# Patient Record
Sex: MALE | Race: WHITE | NOT HISPANIC OR LATINO | Employment: UNEMPLOYED | URBAN - METROPOLITAN AREA
[De-identification: names, ages, dates, MRNs, and addresses within clinical notes are randomized per-mention and may not be internally consistent; named-entity substitution may affect disease eponyms.]

---

## 2023-07-09 ENCOUNTER — APPOINTMENT (OUTPATIENT)
Dept: CARDIOLOGY | Facility: HOSPITAL | Age: 53
DRG: 871 | End: 2023-07-09
Payer: MEDICARE

## 2023-07-09 ENCOUNTER — APPOINTMENT (OUTPATIENT)
Dept: CT IMAGING | Facility: HOSPITAL | Age: 53
DRG: 871 | End: 2023-07-09
Payer: MEDICARE

## 2023-07-09 ENCOUNTER — HOSPITAL ENCOUNTER (INPATIENT)
Facility: HOSPITAL | Age: 53
LOS: 29 days | Discharge: HOME | DRG: 871 | End: 2023-08-07
Attending: EMERGENCY MEDICINE | Admitting: INTERNAL MEDICINE
Payer: MEDICARE

## 2023-07-09 ENCOUNTER — APPOINTMENT (OUTPATIENT)
Dept: GENERAL RADIOLOGY | Facility: HOSPITAL | Age: 53
DRG: 871 | End: 2023-07-09
Payer: MEDICARE

## 2023-07-09 DIAGNOSIS — R00.1 BRADYCARDIA: ICD-10-CM

## 2023-07-09 DIAGNOSIS — Z78.9 DECREASED ACTIVITIES OF DAILY LIVING (ADL): ICD-10-CM

## 2023-07-09 DIAGNOSIS — I95.9 HYPOTENSION, UNSPECIFIED HYPOTENSION TYPE: ICD-10-CM

## 2023-07-09 DIAGNOSIS — T68.XXXA HYPOTHERMIA DUE TO COLD ENVIRONMENT: Primary | ICD-10-CM

## 2023-07-09 DIAGNOSIS — R26.2 DIFFICULTY WALKING: ICD-10-CM

## 2023-07-09 DIAGNOSIS — E87.0 HYPERNATREMIA: ICD-10-CM

## 2023-07-09 DIAGNOSIS — G93.40 ENCEPHALOPATHY ACUTE: ICD-10-CM

## 2023-07-09 LAB
ALBUMIN SERPL-MCNC: 2.9 G/DL (ref 3.5–5.2)
ALBUMIN/GLOB SERPL: 1.3 G/DL
ALP SERPL-CCNC: 90 U/L (ref 39–117)
ALT SERPL W P-5'-P-CCNC: 37 U/L (ref 1–41)
AMPHET+METHAMPHET UR QL: NEGATIVE
ANION GAP SERPL CALCULATED.3IONS-SCNC: 9 MMOL/L (ref 5–15)
APAP SERPL-MCNC: <5 MCG/ML (ref 0–30)
APTT PPP: 34.1 SECONDS (ref 24.2–34.2)
ARTERIAL PATENCY WRIST A: POSITIVE
AST SERPL-CCNC: 40 U/L (ref 1–40)
BARBITURATES UR QL SCN: NEGATIVE
BASE EXCESS BLDA CALC-SCNC: -5.8 MMOL/L (ref -2–2)
BASOPHILS # BLD AUTO: 0.01 10*3/MM3 (ref 0–0.2)
BASOPHILS NFR BLD AUTO: 0.2 % (ref 0–1.5)
BDY SITE: ABNORMAL
BENZODIAZ UR QL SCN: NEGATIVE
BH CV ECHO MEAS - AO ROOT DIAM: 4 CM
BH CV ECHO MEAS - EDV(CUBED): 115.5 ML
BH CV ECHO MEAS - EDV(MOD-SP2): 45 ML
BH CV ECHO MEAS - EDV(MOD-SP4): 61.2 ML
BH CV ECHO MEAS - EF(MOD-BP): 58.7 %
BH CV ECHO MEAS - EF(MOD-SP2): 53.6 %
BH CV ECHO MEAS - EF(MOD-SP4): 68.6 %
BH CV ECHO MEAS - ESV(CUBED): 34.3 ML
BH CV ECHO MEAS - ESV(MOD-SP2): 20.9 ML
BH CV ECHO MEAS - ESV(MOD-SP4): 19.2 ML
BH CV ECHO MEAS - FS: 33.3 %
BH CV ECHO MEAS - IVS/LVPW: 1.11 CM
BH CV ECHO MEAS - IVSD: 1.28 CM
BH CV ECHO MEAS - LA DIMENSION: 3 CM
BH CV ECHO MEAS - LAT PEAK E' VEL: 9.7 CM/SEC
BH CV ECHO MEAS - LV DIASTOLIC VOL/BSA (35-75): 32 CM2
BH CV ECHO MEAS - LV MASS(C)D: 228.2 GRAMS
BH CV ECHO MEAS - LV SYSTOLIC VOL/BSA (12-30): 10 CM2
BH CV ECHO MEAS - LVIDD: 4.9 CM
BH CV ECHO MEAS - LVIDS: 3.3 CM
BH CV ECHO MEAS - LVOT AREA: 3.1 CM2
BH CV ECHO MEAS - LVOT DIAM: 2 CM
BH CV ECHO MEAS - LVPWD: 1.15 CM
BH CV ECHO MEAS - MED PEAK E' VEL: 10.3 CM/SEC
BH CV ECHO MEAS - MV A MAX VEL: 64.7 CM/SEC
BH CV ECHO MEAS - MV DEC SLOPE: 480 CM/SEC2
BH CV ECHO MEAS - MV DEC TIME: 0.17 MSEC
BH CV ECHO MEAS - MV E MAX VEL: 83.1 CM/SEC
BH CV ECHO MEAS - MV E/A: 1.28
BH CV ECHO MEAS - RVDD: 2.9 CM
BH CV ECHO MEAS - SI(MOD-SP2): 12.6 ML/M2
BH CV ECHO MEAS - SI(MOD-SP4): 22 ML/M2
BH CV ECHO MEAS - SV(MOD-SP2): 24.1 ML
BH CV ECHO MEAS - SV(MOD-SP4): 42 ML
BH CV ECHO MEAS - TAPSE (>1.6): 2.09 CM
BH CV ECHO MEASUREMENTS AVERAGE E/E' RATIO: 8.31
BILIRUB SERPL-MCNC: <0.2 MG/DL (ref 0–1.2)
BILIRUB UR QL STRIP: NEGATIVE
BUN SERPL-MCNC: 28 MG/DL (ref 6–20)
BUN/CREAT SERPL: 35.9 (ref 7–25)
CALCIUM SPEC-SCNC: 9.1 MG/DL (ref 8.6–10.5)
CANNABINOIDS SERPL QL: NEGATIVE
CHLORIDE SERPL-SCNC: 119 MMOL/L (ref 98–107)
CK SERPL-CCNC: 141 U/L (ref 20–200)
CLARITY UR: CLEAR
CO2 SERPL-SCNC: 25 MMOL/L (ref 22–29)
COCAINE UR QL: NEGATIVE
COHGB MFR BLD: 0.3 % (ref 0–1.5)
COLOR UR: YELLOW
CREAT SERPL-MCNC: 0.78 MG/DL (ref 0.76–1.27)
CRP SERPL-MCNC: 1.2 MG/DL (ref 0–0.5)
D-LACTATE SERPL-SCNC: 1.3 MMOL/L (ref 0.5–2)
D-LACTATE SERPL-SCNC: 2.8 MMOL/L (ref 0.5–2)
DEPRECATED RDW RBC AUTO: 49.7 FL (ref 37–54)
EGFRCR SERPLBLD CKD-EPI 2021: 107.3 ML/MIN/1.73
EOSINOPHIL # BLD AUTO: 0 10*3/MM3 (ref 0–0.4)
EOSINOPHIL NFR BLD AUTO: 0 % (ref 0.3–6.2)
ERYTHROCYTE [DISTWIDTH] IN BLOOD BY AUTOMATED COUNT: 15.1 % (ref 12.3–15.4)
FENTANYL UR-MCNC: NEGATIVE NG/ML
FHHB: 8.9 % (ref 0–5)
GAS FLOW AIRWAY: 0 LPM
GEN 5 2HR TROPONIN T REFLEX: 46 NG/L
GLOBULIN UR ELPH-MCNC: 2.3 GM/DL
GLUCOSE BLDC GLUCOMTR-MCNC: 128 MG/DL (ref 70–99)
GLUCOSE SERPL-MCNC: 185 MG/DL (ref 65–99)
GLUCOSE UR STRIP-MCNC: NEGATIVE MG/DL
HBA1C MFR BLD: 7.7 % (ref 4.8–5.6)
HCO3 BLDA-SCNC: 20.9 MMOL/L (ref 22–26)
HCT VFR BLD AUTO: 35.8 % (ref 37.5–51)
HGB BLD-MCNC: 12 G/DL (ref 13–17.7)
HGB BLDA-MCNC: 11.2 G/DL (ref 13.8–16.4)
HGB UR QL STRIP.AUTO: NEGATIVE
HIV1+2 AB SER QL: NORMAL
HOLD SPECIMEN: NORMAL
HOLD SPECIMEN: NORMAL
IMM GRANULOCYTES # BLD AUTO: 0.02 10*3/MM3 (ref 0–0.05)
IMM GRANULOCYTES NFR BLD AUTO: 0.3 % (ref 0–0.5)
INHALED O2 CONCENTRATION: 21 %
INR PPP: 1.14 (ref 0.86–1.15)
KETONES UR QL STRIP: NEGATIVE
L PNEUMO1 AG UR QL IA: NEGATIVE
LEFT ATRIUM VOLUME INDEX: 10.7 ML/M2
LEUKOCYTE ESTERASE UR QL STRIP.AUTO: NEGATIVE
LYMPHOCYTES # BLD AUTO: 0.75 10*3/MM3 (ref 0.7–3.1)
LYMPHOCYTES NFR BLD AUTO: 12.1 % (ref 19.6–45.3)
MAGNESIUM SERPL-MCNC: 1.7 MG/DL (ref 1.6–2.6)
MCH RBC QN AUTO: 30.2 PG (ref 26.6–33)
MCHC RBC AUTO-ENTMCNC: 33.5 G/DL (ref 31.5–35.7)
MCV RBC AUTO: 90.2 FL (ref 79–97)
METHADONE UR QL SCN: NEGATIVE
METHGB BLD QL: 0.1 % (ref 0–1.5)
MODALITY: ABNORMAL
MONOCYTES # BLD AUTO: 0.48 10*3/MM3 (ref 0.1–0.9)
MONOCYTES NFR BLD AUTO: 7.8 % (ref 5–12)
MRSA DNA SPEC QL NAA+PROBE: ABNORMAL
NEUTROPHILS NFR BLD AUTO: 4.92 10*3/MM3 (ref 1.7–7)
NEUTROPHILS NFR BLD AUTO: 79.6 % (ref 42.7–76)
NITRITE UR QL STRIP: NEGATIVE
NRBC BLD AUTO-RTO: 0 /100 WBC (ref 0–0.2)
NT-PROBNP SERPL-MCNC: 497.7 PG/ML (ref 0–900)
OPIATES UR QL: NEGATIVE
OXYCODONE UR QL SCN: NEGATIVE
OXYHGB MFR BLDV: 90.7 % (ref 94–99)
PCO2 BLDA: 46.2 MM HG (ref 35–45)
PH BLDA: 7.27 PH UNITS (ref 7.35–7.45)
PH UR STRIP.AUTO: <=5 [PH] (ref 5–8)
PHOSPHATE SERPL-MCNC: 2.6 MG/DL (ref 2.5–4.5)
PLATELET # BLD AUTO: 215 10*3/MM3 (ref 140–450)
PMV BLD AUTO: 12.2 FL (ref 6–12)
PO2 BLD: 356 MM[HG] (ref 0–500)
PO2 BLDA: 74.8 MM HG (ref 80–100)
POTASSIUM SERPL-SCNC: 3.2 MMOL/L (ref 3.5–5.2)
PROCALCITONIN SERPL-MCNC: 0.04 NG/ML (ref 0–0.25)
PROT SERPL-MCNC: 5.2 G/DL (ref 6–8.5)
PROT UR QL STRIP: NEGATIVE
PROTHROMBIN TIME: 14.7 SECONDS (ref 11.8–14.9)
RBC # BLD AUTO: 3.97 10*6/MM3 (ref 4.14–5.8)
S PNEUM AG SPEC QL LA: NEGATIVE
SALICYLATES SERPL-MCNC: <0.3 MG/DL
SAO2 % BLDCOA: 91.1 % (ref 95–99)
SODIUM SERPL-SCNC: 153 MMOL/L (ref 136–145)
SP GR UR STRIP: 1.01 (ref 1–1.03)
TROPONIN T DELTA: 12 NG/L
TROPONIN T SERPL HS-MCNC: 34 NG/L
TSH SERPL DL<=0.05 MIU/L-ACNC: 3.55 UIU/ML (ref 0.27–4.2)
TSH SERPL DL<=0.05 MIU/L-ACNC: 3.74 UIU/ML (ref 0.27–4.2)
UROBILINOGEN UR QL STRIP: NORMAL
VIT B12 BLD-MCNC: 210 PG/ML (ref 211–946)
WBC NRBC COR # BLD: 6.18 10*3/MM3 (ref 3.4–10.8)
WHOLE BLOOD HOLD COAG: NORMAL
WHOLE BLOOD HOLD SPECIMEN: NORMAL

## 2023-07-09 PROCEDURE — 87899 AGENT NOS ASSAY W/OPTIC: CPT | Performed by: NURSE PRACTITIONER

## 2023-07-09 PROCEDURE — 80307 DRUG TEST PRSMV CHEM ANLYZR: CPT | Performed by: INTERNAL MEDICINE

## 2023-07-09 PROCEDURE — 99285 EMERGENCY DEPT VISIT HI MDM: CPT

## 2023-07-09 PROCEDURE — 83036 HEMOGLOBIN GLYCOSYLATED A1C: CPT | Performed by: NURSE PRACTITIONER

## 2023-07-09 PROCEDURE — 84484 ASSAY OF TROPONIN QUANT: CPT | Performed by: INTERNAL MEDICINE

## 2023-07-09 PROCEDURE — 80179 DRUG ASSAY SALICYLATE: CPT | Performed by: INTERNAL MEDICINE

## 2023-07-09 PROCEDURE — 84100 ASSAY OF PHOSPHORUS: CPT | Performed by: EMERGENCY MEDICINE

## 2023-07-09 PROCEDURE — 81003 URINALYSIS AUTO W/O SCOPE: CPT | Performed by: EMERGENCY MEDICINE

## 2023-07-09 PROCEDURE — 85730 THROMBOPLASTIN TIME PARTIAL: CPT | Performed by: EMERGENCY MEDICINE

## 2023-07-09 PROCEDURE — 84443 ASSAY THYROID STIM HORMONE: CPT | Performed by: INTERNAL MEDICINE

## 2023-07-09 PROCEDURE — 25010000002 VANCOMYCIN 5 G RECONSTITUTED SOLUTION: Performed by: INTERNAL MEDICINE

## 2023-07-09 PROCEDURE — 82948 REAGENT STRIP/BLOOD GLUCOSE: CPT

## 2023-07-09 PROCEDURE — G0432 EIA HIV-1/HIV-2 SCREEN: HCPCS | Performed by: NURSE PRACTITIONER

## 2023-07-09 PROCEDURE — 87449 NOS EACH ORGANISM AG IA: CPT | Performed by: NURSE PRACTITIONER

## 2023-07-09 PROCEDURE — 51702 INSERT TEMP BLADDER CATH: CPT

## 2023-07-09 PROCEDURE — 80053 COMPREHEN METABOLIC PANEL: CPT | Performed by: EMERGENCY MEDICINE

## 2023-07-09 PROCEDURE — 83735 ASSAY OF MAGNESIUM: CPT | Performed by: EMERGENCY MEDICINE

## 2023-07-09 PROCEDURE — 84145 PROCALCITONIN (PCT): CPT | Performed by: NURSE PRACTITIONER

## 2023-07-09 PROCEDURE — 80074 ACUTE HEPATITIS PANEL: CPT | Performed by: NURSE PRACTITIONER

## 2023-07-09 PROCEDURE — 0 CEFEPIME PER 500 MG: Performed by: EMERGENCY MEDICINE

## 2023-07-09 PROCEDURE — 25010000002 MAGNESIUM SULFATE 2 GM/50ML SOLUTION: Performed by: INTERNAL MEDICINE

## 2023-07-09 PROCEDURE — 86140 C-REACTIVE PROTEIN: CPT | Performed by: EMERGENCY MEDICINE

## 2023-07-09 PROCEDURE — 36415 COLL VENOUS BLD VENIPUNCTURE: CPT | Performed by: INTERNAL MEDICINE

## 2023-07-09 PROCEDURE — 80184 ASSAY OF PHENOBARBITAL: CPT | Performed by: INTERNAL MEDICINE

## 2023-07-09 PROCEDURE — G0480 DRUG TEST DEF 1-7 CLASSES: HCPCS | Performed by: INTERNAL MEDICINE

## 2023-07-09 PROCEDURE — 99291 CRITICAL CARE FIRST HOUR: CPT | Performed by: INTERNAL MEDICINE

## 2023-07-09 PROCEDURE — 83880 ASSAY OF NATRIURETIC PEPTIDE: CPT | Performed by: INTERNAL MEDICINE

## 2023-07-09 PROCEDURE — 25010000002 HEPARIN (PORCINE) PER 1000 UNITS: Performed by: INTERNAL MEDICINE

## 2023-07-09 PROCEDURE — 84443 ASSAY THYROID STIM HORMONE: CPT | Performed by: EMERGENCY MEDICINE

## 2023-07-09 PROCEDURE — 87040 BLOOD CULTURE FOR BACTERIA: CPT | Performed by: EMERGENCY MEDICINE

## 2023-07-09 PROCEDURE — 82550 ASSAY OF CK (CPK): CPT | Performed by: EMERGENCY MEDICINE

## 2023-07-09 PROCEDURE — 0 POTASSIUM CHLORIDE 10 MEQ/100ML SOLUTION: Performed by: INTERNAL MEDICINE

## 2023-07-09 PROCEDURE — 83050 HGB METHEMOGLOBIN QUAN: CPT | Performed by: EMERGENCY MEDICINE

## 2023-07-09 PROCEDURE — 85610 PROTHROMBIN TIME: CPT | Performed by: EMERGENCY MEDICINE

## 2023-07-09 PROCEDURE — 93005 ELECTROCARDIOGRAM TRACING: CPT | Performed by: EMERGENCY MEDICINE

## 2023-07-09 PROCEDURE — 82607 VITAMIN B-12: CPT | Performed by: NURSE PRACTITIONER

## 2023-07-09 PROCEDURE — 71045 X-RAY EXAM CHEST 1 VIEW: CPT

## 2023-07-09 PROCEDURE — 85025 COMPLETE CBC W/AUTO DIFF WBC: CPT | Performed by: EMERGENCY MEDICINE

## 2023-07-09 PROCEDURE — 82375 ASSAY CARBOXYHB QUANT: CPT | Performed by: EMERGENCY MEDICINE

## 2023-07-09 PROCEDURE — 87641 MR-STAPH DNA AMP PROBE: CPT | Performed by: NURSE PRACTITIONER

## 2023-07-09 PROCEDURE — 80179 DRUG ASSAY SALICYLATE: CPT | Performed by: NURSE PRACTITIONER

## 2023-07-09 PROCEDURE — 25010000002 VANCOMYCIN 5 G RECONSTITUTED SOLUTION: Performed by: EMERGENCY MEDICINE

## 2023-07-09 PROCEDURE — 36600 WITHDRAWAL OF ARTERIAL BLOOD: CPT | Performed by: EMERGENCY MEDICINE

## 2023-07-09 PROCEDURE — 80143 DRUG ASSAY ACETAMINOPHEN: CPT | Performed by: NURSE PRACTITIONER

## 2023-07-09 PROCEDURE — 93010 ELECTROCARDIOGRAM REPORT: CPT | Performed by: SPECIALIST

## 2023-07-09 PROCEDURE — 83605 ASSAY OF LACTIC ACID: CPT | Performed by: EMERGENCY MEDICINE

## 2023-07-09 PROCEDURE — 71250 CT THORAX DX C-: CPT

## 2023-07-09 PROCEDURE — 70450 CT HEAD/BRAIN W/O DYE: CPT

## 2023-07-09 PROCEDURE — 25010000002 DOPAMINE PER 40 MG

## 2023-07-09 PROCEDURE — 82805 BLOOD GASES W/O2 SATURATION: CPT | Performed by: EMERGENCY MEDICINE

## 2023-07-09 PROCEDURE — 0 CEFEPIME PER 500 MG: Performed by: INTERNAL MEDICINE

## 2023-07-09 PROCEDURE — 93306 TTE W/DOPPLER COMPLETE: CPT

## 2023-07-09 RX ORDER — MAGNESIUM SULFATE HEPTAHYDRATE 40 MG/ML
2 INJECTION, SOLUTION INTRAVENOUS ONCE
Status: COMPLETED | OUTPATIENT
Start: 2023-07-09 | End: 2023-07-09

## 2023-07-09 RX ORDER — POLYETHYLENE GLYCOL 3350 17 G/17G
17 POWDER, FOR SOLUTION ORAL DAILY PRN
Status: DISCONTINUED | OUTPATIENT
Start: 2023-07-09 | End: 2023-07-23

## 2023-07-09 RX ORDER — BISACODYL 10 MG
10 SUPPOSITORY, RECTAL RECTAL DAILY PRN
Status: DISCONTINUED | OUTPATIENT
Start: 2023-07-09 | End: 2023-07-23

## 2023-07-09 RX ORDER — VANCOMYCIN 2 GRAM/500 ML IN 0.9 % SODIUM CHLORIDE INTRAVENOUS
20 ONCE
Status: COMPLETED | OUTPATIENT
Start: 2023-07-09 | End: 2023-07-09

## 2023-07-09 RX ORDER — SODIUM CHLORIDE 0.9 % (FLUSH) 0.9 %
10 SYRINGE (ML) INJECTION AS NEEDED
Status: DISCONTINUED | OUTPATIENT
Start: 2023-07-09 | End: 2023-08-07 | Stop reason: HOSPADM

## 2023-07-09 RX ORDER — DOPAMINE HYDROCHLORIDE 160 MG/100ML
INJECTION, SOLUTION INTRAVENOUS
Status: COMPLETED
Start: 2023-07-09 | End: 2023-07-09

## 2023-07-09 RX ORDER — ACETAMINOPHEN 325 MG/1
650 TABLET ORAL EVERY 4 HOURS PRN
Status: DISCONTINUED | OUTPATIENT
Start: 2023-07-09 | End: 2023-08-07 | Stop reason: HOSPADM

## 2023-07-09 RX ORDER — SODIUM CHLORIDE 0.9 % (FLUSH) 0.9 %
10 SYRINGE (ML) INJECTION EVERY 12 HOURS SCHEDULED
Status: DISCONTINUED | OUTPATIENT
Start: 2023-07-09 | End: 2023-08-07 | Stop reason: HOSPADM

## 2023-07-09 RX ORDER — SODIUM CHLORIDE 9 MG/ML
40 INJECTION, SOLUTION INTRAVENOUS AS NEEDED
Status: DISCONTINUED | OUTPATIENT
Start: 2023-07-09 | End: 2023-08-07 | Stop reason: HOSPADM

## 2023-07-09 RX ORDER — OLANZAPINE 15 MG/1
15 TABLET ORAL DAILY
COMMUNITY
End: 2023-08-07 | Stop reason: HOSPADM

## 2023-07-09 RX ORDER — FUROSEMIDE 20 MG/1
20 TABLET ORAL DAILY
COMMUNITY
End: 2023-08-07 | Stop reason: HOSPADM

## 2023-07-09 RX ORDER — AMOXICILLIN 250 MG
2 CAPSULE ORAL 2 TIMES DAILY
Status: DISCONTINUED | OUTPATIENT
Start: 2023-07-10 | End: 2023-07-23

## 2023-07-09 RX ORDER — BISACODYL 5 MG/1
5 TABLET, DELAYED RELEASE ORAL DAILY PRN
Status: DISCONTINUED | OUTPATIENT
Start: 2023-07-09 | End: 2023-07-23

## 2023-07-09 RX ORDER — NOREPINEPHRINE BITARTRATE 0.03 MG/ML
.02-.3 INJECTION, SOLUTION INTRAVENOUS
Status: DISCONTINUED | OUTPATIENT
Start: 2023-07-09 | End: 2023-07-10

## 2023-07-09 RX ORDER — RISPERIDONE 1 MG/1
1 TABLET ORAL 2 TIMES DAILY
Status: ON HOLD | COMMUNITY
End: 2023-08-07 | Stop reason: SDUPTHER

## 2023-07-09 RX ORDER — HEPARIN SODIUM 5000 [USP'U]/ML
5000 INJECTION, SOLUTION INTRAVENOUS; SUBCUTANEOUS EVERY 8 HOURS SCHEDULED
Status: DISCONTINUED | OUTPATIENT
Start: 2023-07-09 | End: 2023-07-10

## 2023-07-09 RX ORDER — DOPAMINE HYDROCHLORIDE 160 MG/100ML
2-20 INJECTION, SOLUTION INTRAVENOUS
Status: DISCONTINUED | OUTPATIENT
Start: 2023-07-09 | End: 2023-07-10

## 2023-07-09 RX ORDER — SODIUM CHLORIDE, SODIUM LACTATE, POTASSIUM CHLORIDE, CALCIUM CHLORIDE 600; 310; 30; 20 MG/100ML; MG/100ML; MG/100ML; MG/100ML
100 INJECTION, SOLUTION INTRAVENOUS CONTINUOUS
Status: DISCONTINUED | OUTPATIENT
Start: 2023-07-09 | End: 2023-07-10

## 2023-07-09 RX ORDER — POTASSIUM CHLORIDE 7.45 MG/ML
10 INJECTION INTRAVENOUS
Status: COMPLETED | OUTPATIENT
Start: 2023-07-09 | End: 2023-07-09

## 2023-07-09 RX ORDER — ONDANSETRON 2 MG/ML
4 INJECTION INTRAMUSCULAR; INTRAVENOUS EVERY 6 HOURS PRN
Status: DISCONTINUED | OUTPATIENT
Start: 2023-07-09 | End: 2023-08-07 | Stop reason: HOSPADM

## 2023-07-09 RX ORDER — SODIUM CHLORIDE 9 MG/ML
250 INJECTION, SOLUTION INTRAVENOUS CONTINUOUS
Status: DISCONTINUED | OUTPATIENT
Start: 2023-07-09 | End: 2023-07-09

## 2023-07-09 RX ADMIN — SODIUM CHLORIDE 2000 ML: 9 INJECTION, SOLUTION INTRAVENOUS at 05:51

## 2023-07-09 RX ADMIN — POTASSIUM CHLORIDE 10 MEQ: 7.46 INJECTION, SOLUTION INTRAVENOUS at 09:12

## 2023-07-09 RX ADMIN — CEFEPIME 2 G: 2 INJECTION, POWDER, FOR SOLUTION INTRAVENOUS at 15:40

## 2023-07-09 RX ADMIN — POTASSIUM CHLORIDE 10 MEQ: 7.46 INJECTION, SOLUTION INTRAVENOUS at 12:51

## 2023-07-09 RX ADMIN — VANCOMYCIN HYDROCHLORIDE 2000 MG: 5 INJECTION, POWDER, LYOPHILIZED, FOR SOLUTION INTRAVENOUS at 06:47

## 2023-07-09 RX ADMIN — Medication 10 ML: at 20:12

## 2023-07-09 RX ADMIN — POTASSIUM CHLORIDE 10 MEQ: 7.46 INJECTION, SOLUTION INTRAVENOUS at 13:42

## 2023-07-09 RX ADMIN — Medication 10 ML: at 12:10

## 2023-07-09 RX ADMIN — DOPAMINE HYDROCHLORIDE 2 MCG/KG/MIN: 160 INJECTION, SOLUTION INTRAVENOUS at 07:31

## 2023-07-09 RX ADMIN — Medication 0.02 MCG/KG/MIN: at 12:03

## 2023-07-09 RX ADMIN — CEFEPIME 2 G: 2 INJECTION, POWDER, FOR SOLUTION INTRAVENOUS at 22:33

## 2023-07-09 RX ADMIN — MAGNESIUM SULFATE IN WATER 2 G: 40 INJECTION, SOLUTION INTRAVENOUS at 09:54

## 2023-07-09 RX ADMIN — HEPARIN SODIUM 5000 UNITS: 5000 INJECTION INTRAVENOUS; SUBCUTANEOUS at 22:33

## 2023-07-09 RX ADMIN — POTASSIUM CHLORIDE 10 MEQ: 7.46 INJECTION, SOLUTION INTRAVENOUS at 11:39

## 2023-07-09 RX ADMIN — CEFEPIME 2 G: 2 INJECTION, POWDER, FOR SOLUTION INTRAVENOUS at 06:46

## 2023-07-09 RX ADMIN — VANCOMYCIN HYDROCHLORIDE 1500 MG: 5 INJECTION, POWDER, LYOPHILIZED, FOR SOLUTION INTRAVENOUS at 20:12

## 2023-07-09 RX ADMIN — HEPARIN SODIUM 5000 UNITS: 5000 INJECTION INTRAVENOUS; SUBCUTANEOUS at 11:40

## 2023-07-09 RX ADMIN — SODIUM CHLORIDE 250 ML/HR: 9 INJECTION, SOLUTION INTRAVENOUS at 06:35

## 2023-07-09 NOTE — LETTER
[facility]  [address]  [place] [address]  [phone]        July 17, 2023      Patient: Alec Medrano  YOB: 1970  Date of Visit: 7/9/2023    Adult Psych referral-    Thank you,  Lakeshia Lopez, MSW  [phone]

## 2023-07-09 NOTE — LETTER
[facility]  [address]  [place] [address]  [phone]        July 24, 2023      Patient: Alec Medrano  YOB: 1970  Date of Visit: 7/9/2023    Referral for placement-    Thank you,  Lakeshia Lopez, MSW  [phone]

## 2023-07-09 NOTE — LETTER
[facility]  [address] [facility] [place] [address]  [phone]        July 17, 2023      Patient: Alec Medrano  YOB: 1970  Date of Visit: 7/9/2023    Referral for adult inpatient psych-    Thank you,  Lakeshia Lopez, MSW  [phone]

## 2023-07-09 NOTE — LETTER
[facility]  [address] [place] [address]  [phone]        August 3, 2023      Patient: Alec Medrano  YOB: 1970  Date of Visit: 7/9/2023    Referral-    Pt's sister, Simran, is his point of contact. Pt is very pleasant but needs supervision due to elopement risks.    Thank you,  Lakeshia Lopez, MSW  [phone]

## 2023-07-09 NOTE — PLAN OF CARE
Goal Outcome Evaluation:                      PATIENT IS OFF OF DAVY HUGGER AND ARCTIC SUN. PATIENT HAS REMAINED NORMOTHERMIC 97.1F SINCE REMOVAL. HE HAS BEEN RELEASED FROM CUSTODY OF CORRECTIONAL FACILITY. LEVO AT 0.11

## 2023-07-09 NOTE — H&P
[facility]   HOSPITALIST HISTORY AND PHYSICAL    Date of admission: 7/9/2023  Patient Name: Alec Medrano   1970  Primary Care Physician:  Provider, No Known    Subjective     Chief Complaint   Patient presents with    Cold Exposure        HPI:  53 y.o. who presents After being found down in his senior care cell shivering/unresponsive his temp axillary at that time was 82 Fahrenheit and is bradycardic to approximately 29 systolic blood pressure approximately 90.  Sent for EMS at that time.  Patient apparently been in solitary confinement for suicidal ideations were psychosis related issues and had not had any of his medications.  APS reports were filed in the ER.    Patient initiated on Arctic sun, warming blanket, Pal hugger and warmed fluids and being admitted to the ICU for further monitoring.    Patient altered able to partially mumble his name but otherwise no hx able to be obtained      PFSH notable for: unable to determine at this time given ams reportedly a psych hx        Objective     Vitals:   Heart Rate:  [29-36] 31  Resp:  [14-20] 14  BP: ()/() 100/70  Flow (L/min):  [2] 2    Physical Exam   Altered ill appearing   Under warming blanket  Pupils equal and round, sluggish but reactive, mildly dilated, occasionally tracking, dry mucosa, 2L NC in mouth   Bradycardia 35-40 during exam, regular, b/l le edema  Roncorous breath sounds, diminished slightly in b/l bases, tachypneic   Abd obese, not rigid, no apparent tenderness, +BS   Only able to barely try and answer name, faintly squeezed my hand to command otherwise no able to participate and can't answer  No tremors, partial withdrawal to painful stimuli    Result Review    Vital signs, labs and any relevant imaging reviewed.     Assessment / Plan     Severe hypothermia  Shock, suspect from hypothermia, possibly also sepsis secondary to undetermined source  Hypotension  Acute hypoxemia requiring at least 2 L nasal cannula initially  Symptomatic  Bradycardia   Prolonged QTc 644 6 and EF acute hypothermia  Hyponatremia  Hypokalemia  Morbid Obesity  Reported psychiatric hx  Suspect underlying CHF      continue rewarming with fluids, blanket,  trend electrolytes, monitor and replace closely  hypernatremia initially suspect from dehydration/hemoconcentration  receiving initial rewarming with iv NS initially, repeat bmp and adjust accordingly   Iv dopamine given shock in ed, likely change to levophed if needed/once in icu  Pulmcrit/dr edwards consulted for additional assistance, appreciate help  check echo, ct chest, ct head  tox screen  panculture, empiric vanc/cefepime for now, wbc wnl, likely narrow based on trend   consider lp/eeg if mentation doesnt continue to show improvement with further resuscitation   Obtaining additional records from shelter, review outpt meds    Admit to icu  Discussed initial management and workup with ED provider    DVT prophylaxis:  Medical DVT prophylaxis orders are present.  Code: full    CBC          7/9/2023    06:11   CBC   WBC 6.18    RBC 3.97    Hemoglobin 12.0    Hematocrit 35.8    MCV 90.2    MCH 30.2    MCHC 33.5    RDW 15.1    Platelets 215        CMP          7/9/2023    06:11   CMP   Glucose 185    BUN 28    Creatinine 0.78    EGFR 107.3    Sodium 153    Potassium 3.2    Chloride 119    Calcium 9.1    Total Protein 5.2    Albumin 2.9    Globulin 2.3    Total Bilirubin <0.2    Alkaline Phosphatase 90    AST (SGOT) 40    ALT (SGPT) 37    Albumin/Globulin Ratio 1.3    BUN/Creatinine Ratio 35.9    Anion Gap 9.0    Patient is critically ill due to shock, severe hypothermia, severe bradycardia and multiple issues as above.  I have spent >35 minutes of critical care time reviewing documentation, pertinent labs, imaging studies, examining the patient, modifying care plan, and discussing patient's condition and care plan with the rn/ed md/pulm

## 2023-07-09 NOTE — LETTER
[facility]  [address] [place] [facility] [address]  [phone]        July 17, 2023      Patient: Alec Medrano  YOB: 1970  Date of Visit: 7/9/2023    Referral for adult inpatient psych-    Thank you,  Lakeshia Lopez, MSW  [phone]
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
## 2023-07-09 NOTE — PROGRESS NOTES
"[facility] Clinical Pharmacy Services: Vancomycin Pharmacokinetic Initial Consult Note    Alec Medrano is a 53 y.o. male who is on day 1 of pharmacy to dose vancomycin for Sepsis.    Consult Information  Consulting Provider: BEAU Aguilar  Planned Duration of Therapy: 7 days  Was Patient Receiving Prior to Admission/Consult?: No  Loading Dose Ordered or Given: 2000 mg on  at 0647  PK/PD Target: -600 mg/L.hr   Relevant ID History: N/A  Other Antimicrobials: Cefepime    Imaging Reviewed?: Yes    Microbiology Data  MRSA PCR performed: No  Culture/Source:   : Blood - Pending    Vitals/Labs  Ht: 154.9 cm (61\"); Wt: 93.8 kg (206 lb 12.7 oz)  Temp (24hrs), Av.7 øF (29.8 øC), Min:85 øF (29.4 øC), Max:86.3 øF (30.2 øC)   Estimated Creatinine Clearance: 108 mL/min (by C-G formula based on SCr of 0.78 mg/dL).       Results from last 7 days   Lab Units 23  0611   CREATININE mg/dL 0.78   WBC 10*3/mm3 6.18     Assessment/Plan:    Vancomycin Dose:  1500 mg IV every 12 hours; which provides the following predicted parameters:  AUC24,ss: 564 mg/L.hr  PAUC*: 82 %  Ctrough,ss: 17.1 mg/L  Pconc*: 38 %  Tox.: 13 %  Vanc Random planned for 7/10 at 1900  Patient has order for Complete Metabolic Panel    Pharmacy will follow patient's kidney function and will adjust doses and obtain levels as necessary. Thank you for involving pharmacy in this patient's care. Please contact pharmacy with any questions or concerns.                           Marisabel Montesinos  Clinical Pharmacist    "

## 2023-07-09 NOTE — CONSULTS
Pulmonary / Critical Care Consult Note      Patient Name: Alec Medrano  : 1970  MRN: [id number]  Primary Care Physician:  Provider, No Known  Referring Physician: Maurizio Aguilar MD  Date of admission: 2023    Subjective   Subjective     Reason for Consult/ Chief Complaint: Found down, hypothermia    HPI:  Alec Medrano is a 53 y.o. male with unknown past medical history, reported to the ED after being found down unresponsive.  Patient is currently incarcerated.  Reportedly patient was hyperactive and was recently started on Zyprexa and Risperdal, along with increasing dose of diuretic.  Today he was found unresponsive in solitary confinement.  In the emergency room patient was noted to have temperature of 85 degrees, he was hypotensive and bradycardic.  Laboratory findings showed WBC 6.1, hemoglobin 12, platelets 215, , lactic acid 2.8, creatinine 0.78, sodium 153, potassium 3.2, CO2 25.  CT of chest did show consolidation bilateral lower lobes, possible displaced rib fractures on right side. He was given IV fluid resuscitation, IV antibiotics and initiated on Levophed.  Warming blanket was applied.  He was admitted to the ICU for critical care management.  Currently patient is on dopamine at 5 mcg/kg/min.  He is awake, does follow some simple commands.  Confused.      Review of Systems  Unable to obtain due to patient status    Personal History     Past Medical History:   Diagnosis Date    Hypotension     Pneumothorax     Pulmonary emboli     Unresponsive episode        History reviewed. No pertinent surgical history.    Family History: family history is not on file. Otherwise pertinent FHx was reviewed and not pertinent to current issue.    Social History:  reports that he has quit smoking. His smoking use included cigarettes. He has a 45.00 pack-year smoking history. He does not have any smokeless tobacco history on file. He reports that he does not currently use alcohol.    Home  Medications:  OLANZapine, furosemide, and risperiDONE    Allergies:  No Known Allergies    Objective    Objective     Vitals:   Temp:  [85 øF (29.4 øC)-94.9 øF (34.9 øC)] 94.9 øF (34.9 øC)  Heart Rate:  [29-71] 71  Resp:  [12-20] 12  BP: ()/() 155/89  Flow (L/min):  [2] 2    Physical Exam:  Vital Signs Reviewed   General: Awake, acutely ill, no acute distress   HEENT:  PERRL, EOMI.  dry oral mucosa  Neck:  Supple, no JVD, no thyromegaly  Chest:  good aeration, clear to auscultation bilaterally, tympanic to percussion bilaterally, no work of breathing noted  CV: Sinus bradycardia, no MGR, pulses 2+, equal.  Abd:  Soft, NT, ND, + BS, no HSM  EXT:  no clubbing, no cyanosis, no edema  Neuro:  A&Ox1, CN grossly intact, no focal deficits.  Skin: No rashes or lesions noted  Bilateral lower extremity edema, increased erythema lower extremities, abrasion over left knee    Result Review    Result Review:  I have personally reviewed the results from the time of this admission to 7/9/2023 14:04 EDT and agree with these findings:  [x]  Laboratory  [x]  Microbiology  [x]  Radiology  []  EKG/Telemetry   [x]  Cardiology/Vascular   []  Pathology  []  Old records  []  Other:  Most notable findings include:       Lab 07/09/23  0611   WBC 6.18   HEMOGLOBIN 12.0*   HEMATOCRIT 35.8*   PLATELETS 215   SODIUM 153*   POTASSIUM 3.2*   CHLORIDE 119*   CO2 25.0   BUN 28*   CREATININE 0.78   GLUCOSE 185*   CALCIUM 9.1   PHOSPHORUS 2.6   TOTAL PROTEIN 5.2*   ALBUMIN 2.9*   GLOBULIN 2.3       CT Head Without Contrast    Result Date: 7/9/2023    1. No acute intracranial abnormality. 2. Paranasal sinus disease worse involving the left maxillary sinus. 3. Suggested old fracture deformity of the nasal bone area.     GRECIA COWAN MD       Electronically Signed and Approved By: GRECIA COWAN MD on 7/09/2023 at 10:22             CT Chest Without Contrast Diagnostic    Result Date: 7/9/2023    1. Consolidation in the lower lobes which could be  reflective of a multi focal pneumonia.  Some atelectasis may also account for some of the appearance.  There is some atelectasis/scarring in the lingula. 2. Possible acute nondisplaced rib fractures on the right.     GRECIA COWAN MD       Electronically Signed and Approved By: GRECIA COWAN MD on 7/09/2023 at 10:30             XR Chest 1 View    Result Date: 7/9/2023    1. Cardiomegaly 2. Slight prominence of central pulmonary vascular markings that may reflect some vascular congestion.       GRECIA COWAN MD       Electronically Signed and Approved By: GRECIA COWAN MD on 7/09/2023 at 6:45                 Assessment & Plan   Assessment / Plan     Active Hospital Problems:  Active Hospital Problems    Diagnosis     **Hypothermia      Impression:  Septic shock, present on admission  Altered mental status  Acute hypoxic hypercarbic respiratory failure  Hypothermia  Pneumonia  Aspiration pneumonia  Lactic acidosis  Hypokalemia  Hypomagnesia  Hypernatremia  Bilateral lower extremity edema    Plan:  -Continue to monitor in ICU  -Patient is currently on 2 L nasal cannula  -Continue bronchopulmonary hygiene  -Aspiration precautions  -Check procalcitonin, blood culture in process, check urine antigens, MRSA PCR  -Check HIV, hepatitis  -Check b12, folate  -Check urine drug screen, Tylenol and salicylate level  -Echocardiogram pending  -Continue dopamine to maintain MAP greater than 65, heart rate greater than 60  -Okay to transition to Levophed if needed  -Continue broad-spectrum antibiotics vancomycin and cefepime  -Trend renal function, monitor replace electrolytes  -Replace potassium and magnesium  -Check A1c    DVT prophylaxis:  Medical DVT prophylaxis orders are present.     Code Status and Medical Interventions:   Ordered at: 07/09/23 0748     Code Status (Patient has no pulse and is not breathing):    CPR (Attempt to Resuscitate)     Medical Interventions (Patient has pulse or is breathing):    Full Support      I,  TOREY Mendez, spent 16 minutes critical care time in accordance to split shared billing.    Electronically signed by TOREY Jones, 07/09/23, 2:04 PM EDT.      The patient is critically ill in the ICU with shock, possibly hypovolemic or septic monitor mental status. Multidisciplinary bedside critical care rounds were performed with nursing staff, respiratory therapy, pharmacy, nutritional services, social work. I have personally reviewed the chart, labs and any pertinent imaging available.  We have spent 36 minutes of critical care time, excluding procedures, in the care of this patient. I, Dr Arroyo, spent 20 mins of critical care time according to split shared billing guidelines.    Electronically signed by Lv Arroyo MD, 07/09/23, 4:08 PM EDT..

## 2023-07-09 NOTE — LETTER
[facility]  [address] [place] [address]  [phone]        July 17, 2023      Patient: Alec Medrano  YOB: 1970  Date of Visit: 7/9/2023    Referral for adult inpatient psych-    Thank you,  Lakeshia Lopez, MSW  [phone]

## 2023-07-10 LAB
ALBUMIN SERPL-MCNC: 2.6 G/DL (ref 3.5–5.2)
ALBUMIN/GLOB SERPL: 1 G/DL
ALP SERPL-CCNC: 80 U/L (ref 39–117)
ALT SERPL W P-5'-P-CCNC: 50 U/L (ref 1–41)
ANION GAP SERPL CALCULATED.3IONS-SCNC: 12.3 MMOL/L (ref 5–15)
ANION GAP SERPL CALCULATED.3IONS-SCNC: 7.1 MMOL/L (ref 5–15)
ANION GAP SERPL CALCULATED.3IONS-SCNC: 9.7 MMOL/L (ref 5–15)
AST SERPL-CCNC: 56 U/L (ref 1–40)
BASOPHILS # BLD AUTO: 0.03 10*3/MM3 (ref 0–0.2)
BASOPHILS NFR BLD AUTO: 0.2 % (ref 0–1.5)
BILIRUB SERPL-MCNC: 0.2 MG/DL (ref 0–1.2)
BUN SERPL-MCNC: 22 MG/DL (ref 6–20)
BUN SERPL-MCNC: 22 MG/DL (ref 6–20)
BUN SERPL-MCNC: 23 MG/DL (ref 6–20)
BUN/CREAT SERPL: 17.1 (ref 7–25)
BUN/CREAT SERPL: 18.8 (ref 7–25)
BUN/CREAT SERPL: 20.5 (ref 7–25)
BURR CELLS BLD QL SMEAR: NORMAL
BURR CELLS BLD QL SMEAR: NORMAL
CALCIUM SPEC-SCNC: 8.8 MG/DL (ref 8.6–10.5)
CALCIUM SPEC-SCNC: 8.8 MG/DL (ref 8.6–10.5)
CALCIUM SPEC-SCNC: 9 MG/DL (ref 8.6–10.5)
CHLORIDE SERPL-SCNC: 122 MMOL/L (ref 98–107)
CHLORIDE SERPL-SCNC: 123 MMOL/L (ref 98–107)
CHLORIDE SERPL-SCNC: 124 MMOL/L (ref 98–107)
CK SERPL-CCNC: 65 U/L (ref 20–200)
CLUMPED PLATELETS: PRESENT
CO2 SERPL-SCNC: 15.7 MMOL/L (ref 22–29)
CO2 SERPL-SCNC: 21.3 MMOL/L (ref 22–29)
CO2 SERPL-SCNC: 22.9 MMOL/L (ref 22–29)
CREAT SERPL-MCNC: 1.12 MG/DL (ref 0.76–1.27)
CREAT SERPL-MCNC: 1.17 MG/DL (ref 0.76–1.27)
CREAT SERPL-MCNC: 1.29 MG/DL (ref 0.76–1.27)
DEPRECATED RDW RBC AUTO: 51.8 FL (ref 37–54)
EGFRCR SERPLBLD CKD-EPI 2021: 66.7 ML/MIN/1.73
EGFRCR SERPLBLD CKD-EPI 2021: 75 ML/MIN/1.73
EGFRCR SERPLBLD CKD-EPI 2021: 79 ML/MIN/1.73
EOSINOPHIL # BLD AUTO: 0.03 10*3/MM3 (ref 0–0.4)
EOSINOPHIL NFR BLD AUTO: 0.2 % (ref 0.3–6.2)
ERYTHROCYTE [DISTWIDTH] IN BLOOD BY AUTOMATED COUNT: 15.7 % (ref 12.3–15.4)
FOLATE SERPL-MCNC: 5.6 NG/ML (ref 4.78–24.2)
GIANT PLATELETS: NORMAL
GLOBULIN UR ELPH-MCNC: 2.5 GM/DL
GLUCOSE BLDC GLUCOMTR-MCNC: 119 MG/DL (ref 70–99)
GLUCOSE BLDC GLUCOMTR-MCNC: 35 MG/DL (ref 70–99)
GLUCOSE BLDC GLUCOMTR-MCNC: 39 MG/DL (ref 70–99)
GLUCOSE BLDC GLUCOMTR-MCNC: 40 MG/DL (ref 70–99)
GLUCOSE BLDC GLUCOMTR-MCNC: 76 MG/DL (ref 70–99)
GLUCOSE BLDC GLUCOMTR-MCNC: 80 MG/DL (ref 70–99)
GLUCOSE BLDC GLUCOMTR-MCNC: 85 MG/DL (ref 70–99)
GLUCOSE SERPL-MCNC: 105 MG/DL (ref 65–99)
GLUCOSE SERPL-MCNC: 65 MG/DL (ref 65–99)
GLUCOSE SERPL-MCNC: 89 MG/DL (ref 65–99)
HAV IGM SERPL QL IA: NORMAL
HBV CORE IGM SERPL QL IA: NORMAL
HBV SURFACE AG SERPL QL IA: NORMAL
HCT VFR BLD AUTO: 36.6 % (ref 37.5–51)
HCV AB SER DONR QL: NORMAL
HGB BLD-MCNC: 12.1 G/DL (ref 13–17.7)
IMM GRANULOCYTES # BLD AUTO: 0.07 10*3/MM3 (ref 0–0.05)
IMM GRANULOCYTES NFR BLD AUTO: 0.5 % (ref 0–0.5)
LARGE PLATELETS: NORMAL
LYMPHOCYTES # BLD AUTO: 0.95 10*3/MM3 (ref 0.7–3.1)
LYMPHOCYTES NFR BLD AUTO: 6.6 % (ref 19.6–45.3)
MAGNESIUM SERPL-MCNC: 1.7 MG/DL (ref 1.6–2.6)
MCH RBC QN AUTO: 30.2 PG (ref 26.6–33)
MCHC RBC AUTO-ENTMCNC: 33.1 G/DL (ref 31.5–35.7)
MCV RBC AUTO: 91.3 FL (ref 79–97)
MONOCYTES # BLD AUTO: 1.27 10*3/MM3 (ref 0.1–0.9)
MONOCYTES NFR BLD AUTO: 8.9 % (ref 5–12)
NEUTROPHILS NFR BLD AUTO: 11.99 10*3/MM3 (ref 1.7–7)
NEUTROPHILS NFR BLD AUTO: 83.6 % (ref 42.7–76)
NRBC BLD AUTO-RTO: 0 /100 WBC (ref 0–0.2)
PHOSPHATE SERPL-MCNC: 3.4 MG/DL (ref 2.5–4.5)
PLATELET # BLD AUTO: 234 10*3/MM3 (ref 140–450)
PMV BLD AUTO: 12 FL (ref 6–12)
POTASSIUM SERPL-SCNC: 3.4 MMOL/L (ref 3.5–5.2)
POTASSIUM SERPL-SCNC: 3.8 MMOL/L (ref 3.5–5.2)
POTASSIUM SERPL-SCNC: 4.2 MMOL/L (ref 3.5–5.2)
PROT SERPL-MCNC: 5.1 G/DL (ref 6–8.5)
QT INTERVAL: 674 MS
RBC # BLD AUTO: 4.01 10*6/MM3 (ref 4.14–5.8)
SODIUM SERPL-SCNC: 150 MMOL/L (ref 136–145)
SODIUM SERPL-SCNC: 153 MMOL/L (ref 136–145)
SODIUM SERPL-SCNC: 155 MMOL/L (ref 136–145)
WBC MORPH BLD: NORMAL
WBC NRBC COR # BLD: 14.34 10*3/MM3 (ref 3.4–10.8)

## 2023-07-10 PROCEDURE — 93005 ELECTROCARDIOGRAM TRACING: CPT | Performed by: INTERNAL MEDICINE

## 2023-07-10 PROCEDURE — 25010000002 AMPICILLIN-SULBACTAM PER 1.5 G: Performed by: INTERNAL MEDICINE

## 2023-07-10 PROCEDURE — 84100 ASSAY OF PHOSPHORUS: CPT | Performed by: INTERNAL MEDICINE

## 2023-07-10 PROCEDURE — 82746 ASSAY OF FOLIC ACID SERUM: CPT | Performed by: NURSE PRACTITIONER

## 2023-07-10 PROCEDURE — 99291 CRITICAL CARE FIRST HOUR: CPT | Performed by: INTERNAL MEDICINE

## 2023-07-10 PROCEDURE — 97165 OT EVAL LOW COMPLEX 30 MIN: CPT

## 2023-07-10 PROCEDURE — 82948 REAGENT STRIP/BLOOD GLUCOSE: CPT

## 2023-07-10 PROCEDURE — 82550 ASSAY OF CK (CPK): CPT | Performed by: NURSE PRACTITIONER

## 2023-07-10 PROCEDURE — 0 CEFEPIME PER 500 MG: Performed by: INTERNAL MEDICINE

## 2023-07-10 PROCEDURE — 94799 UNLISTED PULMONARY SVC/PX: CPT

## 2023-07-10 PROCEDURE — 83735 ASSAY OF MAGNESIUM: CPT | Performed by: INTERNAL MEDICINE

## 2023-07-10 PROCEDURE — 85007 BL SMEAR W/DIFF WBC COUNT: CPT | Performed by: INTERNAL MEDICINE

## 2023-07-10 PROCEDURE — 80053 COMPREHEN METABOLIC PANEL: CPT | Performed by: INTERNAL MEDICINE

## 2023-07-10 PROCEDURE — 93010 ELECTROCARDIOGRAM REPORT: CPT | Performed by: INTERNAL MEDICINE

## 2023-07-10 PROCEDURE — 94761 N-INVAS EAR/PLS OXIMETRY MLT: CPT

## 2023-07-10 PROCEDURE — 25010000002 MAGNESIUM SULFATE 2 GM/50ML SOLUTION: Performed by: INTERNAL MEDICINE

## 2023-07-10 PROCEDURE — 25010000002 HEPARIN (PORCINE) PER 1000 UNITS: Performed by: INTERNAL MEDICINE

## 2023-07-10 PROCEDURE — 85025 COMPLETE CBC W/AUTO DIFF WBC: CPT | Performed by: INTERNAL MEDICINE

## 2023-07-10 PROCEDURE — 25010000002 VANCOMYCIN 5 G RECONSTITUTED SOLUTION: Performed by: INTERNAL MEDICINE

## 2023-07-10 PROCEDURE — 25010000002 THIAMINE PER 100 MG: Performed by: INTERNAL MEDICINE

## 2023-07-10 PROCEDURE — 25010000002 CYANOCOBALAMIN PER 1000 MCG: Performed by: INTERNAL MEDICINE

## 2023-07-10 RX ORDER — POTASSIUM CHLORIDE 750 MG/1
20 CAPSULE, EXTENDED RELEASE ORAL ONCE
Status: COMPLETED | OUTPATIENT
Start: 2023-07-10 | End: 2023-07-10

## 2023-07-10 RX ORDER — DEXTROSE MONOHYDRATE 25 G/50ML
50 INJECTION, SOLUTION INTRAVENOUS
Status: DISCONTINUED | OUTPATIENT
Start: 2023-07-10 | End: 2023-08-07 | Stop reason: HOSPADM

## 2023-07-10 RX ORDER — LIDOCAINE 50 MG/G
2 PATCH TOPICAL
Status: DISCONTINUED | OUTPATIENT
Start: 2023-07-10 | End: 2023-08-07 | Stop reason: HOSPADM

## 2023-07-10 RX ORDER — CHOLECALCIFEROL (VITAMIN D3) 125 MCG
1000 CAPSULE ORAL DAILY
Status: DISCONTINUED | OUTPATIENT
Start: 2023-07-11 | End: 2023-08-07 | Stop reason: HOSPADM

## 2023-07-10 RX ORDER — CYANOCOBALAMIN 1000 UG/ML
1000 INJECTION, SOLUTION INTRAMUSCULAR; SUBCUTANEOUS
Status: DISCONTINUED | OUTPATIENT
Start: 2023-07-10 | End: 2023-08-07 | Stop reason: HOSPADM

## 2023-07-10 RX ORDER — DEXTROSE MONOHYDRATE 50 MG/ML
100 INJECTION, SOLUTION INTRAVENOUS CONTINUOUS
Status: DISCONTINUED | OUTPATIENT
Start: 2023-07-10 | End: 2023-07-10

## 2023-07-10 RX ORDER — NOREPINEPHRINE BITARTRATE 0.03 MG/ML
.02-.3 INJECTION, SOLUTION INTRAVENOUS
Status: DISCONTINUED | OUTPATIENT
Start: 2023-07-10 | End: 2023-07-11

## 2023-07-10 RX ORDER — DEXTROSE MONOHYDRATE 100 MG/ML
100 INJECTION, SOLUTION INTRAVENOUS CONTINUOUS
Status: DISCONTINUED | OUTPATIENT
Start: 2023-07-10 | End: 2023-07-11

## 2023-07-10 RX ORDER — DEXTROSE MONOHYDRATE 25 G/50ML
INJECTION, SOLUTION INTRAVENOUS
Status: DISPENSED
Start: 2023-07-10 | End: 2023-07-10

## 2023-07-10 RX ORDER — ENOXAPARIN SODIUM 100 MG/ML
40 INJECTION SUBCUTANEOUS EVERY 24 HOURS
Status: DISCONTINUED | OUTPATIENT
Start: 2023-07-11 | End: 2023-08-07 | Stop reason: HOSPADM

## 2023-07-10 RX ORDER — MAGNESIUM SULFATE HEPTAHYDRATE 40 MG/ML
2 INJECTION, SOLUTION INTRAVENOUS ONCE
Status: COMPLETED | OUTPATIENT
Start: 2023-07-10 | End: 2023-07-10

## 2023-07-10 RX ORDER — MULTIPLE VITAMINS W/ MINERALS TAB 9MG-400MCG
1 TAB ORAL DAILY
Status: DISCONTINUED | OUTPATIENT
Start: 2023-07-11 | End: 2023-08-07 | Stop reason: HOSPADM

## 2023-07-10 RX ADMIN — HEPARIN SODIUM 5000 UNITS: 5000 INJECTION INTRAVENOUS; SUBCUTANEOUS at 06:00

## 2023-07-10 RX ADMIN — SODIUM CHLORIDE 3 G: 900 INJECTION INTRAVENOUS at 23:16

## 2023-07-10 RX ADMIN — THIAMINE HYDROCHLORIDE 500 MG: 100 INJECTION, SOLUTION INTRAMUSCULAR; INTRAVENOUS at 18:18

## 2023-07-10 RX ADMIN — DEXTROSE MONOHYDRATE 50 ML: 25 INJECTION, SOLUTION INTRAVENOUS at 15:33

## 2023-07-10 RX ADMIN — Medication 0.06 MCG/KG/MIN: at 01:56

## 2023-07-10 RX ADMIN — DEXTROSE MONOHYDRATE 100 ML/HR: 50 INJECTION, SOLUTION INTRAVENOUS at 10:52

## 2023-07-10 RX ADMIN — Medication 10 ML: at 08:05

## 2023-07-10 RX ADMIN — VANCOMYCIN HYDROCHLORIDE 1500 MG: 5 INJECTION, POWDER, LYOPHILIZED, FOR SOLUTION INTRAVENOUS at 08:04

## 2023-07-10 RX ADMIN — SODIUM CHLORIDE, POTASSIUM CHLORIDE, SODIUM LACTATE AND CALCIUM CHLORIDE 100 ML/HR: 600; 310; 30; 20 INJECTION, SOLUTION INTRAVENOUS at 01:10

## 2023-07-10 RX ADMIN — THIAMINE HYDROCHLORIDE 500 MG: 100 INJECTION, SOLUTION INTRAMUSCULAR; INTRAVENOUS at 10:00

## 2023-07-10 RX ADMIN — POTASSIUM CHLORIDE 20 MEQ: 750 CAPSULE, EXTENDED RELEASE ORAL at 15:16

## 2023-07-10 RX ADMIN — DEXTROSE MONOHYDRATE 50 ML: 25 INJECTION, SOLUTION INTRAVENOUS at 10:55

## 2023-07-10 RX ADMIN — SODIUM CHLORIDE 3 G: 900 INJECTION INTRAVENOUS at 10:00

## 2023-07-10 RX ADMIN — MAGNESIUM SULFATE IN WATER 2 G: 40 INJECTION, SOLUTION INTRAVENOUS at 10:51

## 2023-07-10 RX ADMIN — SODIUM CHLORIDE 3 G: 900 INJECTION INTRAVENOUS at 15:37

## 2023-07-10 RX ADMIN — DEXTROSE MONOHYDRATE 50 ML: 25 INJECTION, SOLUTION INTRAVENOUS at 08:29

## 2023-07-10 RX ADMIN — CEFEPIME 2 G: 2 INJECTION, POWDER, FOR SOLUTION INTRAVENOUS at 06:00

## 2023-07-10 RX ADMIN — DEXTROSE MONOHYDRATE 100 ML/HR: 100 INJECTION, SOLUTION INTRAVENOUS at 15:34

## 2023-07-10 RX ADMIN — CYANOCOBALAMIN 1000 MCG: 1000 INJECTION, SOLUTION INTRAMUSCULAR at 10:00

## 2023-07-10 RX ADMIN — HEPARIN SODIUM 5000 UNITS: 5000 INJECTION INTRAVENOUS; SUBCUTANEOUS at 15:16

## 2023-07-10 NOTE — PROGRESS NOTES
Critical care    Patient critically ill in the intensive care unit with septic shock lactic acidosis and hypoxic and hypercapnic respiratory failure  Has improved  Still hypotension still  Mental status improved but still altered      Review of Systems  Unable to obtain due to patient status      Vitals:    07/10/23 1215 07/10/23 1230 07/10/23 1245 07/10/23 1300   BP: (!) 88/64 98/64 (!) 84/67 96/62   Pulse: 58 60 62 65   Resp:       Temp:       TempSrc:       SpO2: 96% 96% 96% 94%   Weight:       Height:             Physical Exam:  Vital Signs Reviewed   General: Awake, acutely ill, no acute distress   HEENT:  PERRL, EOMI.  dry oral mucosa  Neck:  Supple, no JVD, no thyromegaly  Chest:  good aeration, clear to auscultation bilaterally, tympanic to percussion bilaterally, no work of breathing noted  CV: Sinus bradycardia, no MGR, pulses 2+, equal.  Abd:  Soft, NT, ND, + BS, no HSM  EXT:  no clubbing, no cyanosis, no edema  Neuro:  A&Ox1, CN grossly intact, no focal deficits.  Skin: No rashes or lesions noted  Bilateral lower extremity edema, increased erythema lower extremities, abrasion over left knee    Impression:  Septic shock, present on admission  Altered mental status  Acute hypoxic hypercarbic respiratory failure  Hypothermia  Pneumonia likely aspiration pneumonia concern for gram-positive and gram-negative organisms  Aspiration pneumonia  Lactic acidosis  Hypokalemia  Hypomagnesia  Hypernatremia  Bilateral lower extremity edema    Plan  Dopamine as needed for bradycardia   Levophed for hypotension if needed  De-escalate antibiotics to ampicillin  Replace magnesium with 4 g of IV mag  Remove Esposito catheter and follow-up with serial bladder scans  Patient does have significant B12 deficiency we will B12 injections  Start folic acid  Start multivitamin    Critically ill in the ICU with hypercapnic respiratory failure hypoxic respiratory failure and septic shock  Total critical care time spent 36  minutes    Electronically signed by Wilson Fermin DO, 07/10/23, 1:40 PM EDT.

## 2023-07-10 NOTE — PLAN OF CARE
Goal Outcome Evaluation:     Patient is alert and confused. Normothermic throughout the shift. Patient had a brief bradycardic episode. Levophed weaned from 0.1 to 0.02 throughout the shift

## 2023-07-10 NOTE — CONSULTS
"Nutrition Services    Patient Name: Alec Medrano  YOB: 1970  MRN: [id number]  Admission date: 7/9/2023      CLINICAL NUTRITION ASSESSMENT      Reason for Assessment  MST score 2+     H&P:    Past Medical History:   Diagnosis Date    COPD (chronic obstructive pulmonary disease)     Diabetes mellitus     Hyperlipidemia     Hypertension     Hypotension     Pneumothorax     Pulmonary emboli     Unresponsive episode         Current Problems:   Active Hospital Problems    Diagnosis     **Hypothermia         Nutrition/Diet History         Narrative     Patient admitted with hypothermia. Patient with AMS per chart review. Followed by RD for MST score of 2 for unsure weight loss, no report of decreased appetite/intake. Limited weight history available. BMI 39. No physical signs of malnutrition noted at this time. Currently NPO. Will monitor intake as diet advances and follow up to provide nutrition intervention if indicated.       Anthropometrics        Current Height, Weight Height: 154.9 cm (61\")  Weight: 93.8 kg (206 lb 12.7 oz)   Current BMI Body mass index is 39.07 kg/mý.       Weight Hx  Wt Readings from Last 30 Encounters:   07/09/23 0748 93.8 kg (206 lb 12.7 oz)   07/09/23 0559 93.8 kg (206 lb 12.7 oz)   07/09/23 0550 93.8 kg (206 lb 12.7 oz)            Wt Change Observation [place]      Estimated/Assessed Needs       Energy Requirements 30 kcal/kg adj. BW (62.7 kg)   EST Needs (kcal/day) 1880       Protein Requirements 1.0-1.3 g/kg adj. BW   EST Daily Needs (g/day) 65-80       Fluid Requirements 1 ml/kcal     Estimated Needs (mL/day) 1880     Labs/Medications         Pertinent Labs Reviewed.   Results from last 7 days   Lab Units 07/10/23  0237 07/09/23  0611   SODIUM mmol/L 155* 153*   POTASSIUM mmol/L 3.8 3.2*   CHLORIDE mmol/L 124* 119*   CO2 mmol/L 21.3* 25.0   BUN mg/dL 23* 28*   CREATININE mg/dL 1.12 0.78   CALCIUM mg/dL 8.8 9.1   BILIRUBIN mg/dL 0.2 <0.2   ALK PHOS U/L 80 90   ALT (SGPT) U/L 50* 37 "   AST (SGOT) U/L 56* 40   GLUCOSE mg/dL 65 185*     Results from last 7 days   Lab Units 07/10/23  0237 07/09/23  0611   MAGNESIUM mg/dL 1.7 1.7   PHOSPHORUS mg/dL 3.4 2.6   HEMOGLOBIN g/dL 12.1* 12.0*   HEMATOCRIT % 36.6* 35.8*     No results found for: COVID19  Lab Results   Component Value Date    HGBA1C 7.70 (H) 07/09/2023         Pertinent Medications Reviewed.     Current Nutrition Orders & Evaluation of Intake       Oral Nutrition     Current PO Diet NPO Diet NPO Type: Strict NPO   Supplement No active supplement orders       Malnutrition Severity Assessment                Nutrition Diagnosis         Nutrition Dx Problem 1 No nutrition diagnosis at this time.       Nutrition Intervention         No nutrition intervention at this time.     Medical Nutrition Therapy/Nutrition Education          Learner     Readiness N/A  Education not indicated at this time and Education not appropriate at this time     Method     Response N/A  N/A     Monitor/Evaluation        Monitor Per protocol, PO intake, Pertinent labs, Weight, POC/GOC, Diet advancement       Nutrition Discharge Plan         To be determined       Electronically signed by:  Nisa Osborne RD  07/10/23 10:45 EDT

## 2023-07-10 NOTE — PAYOR COMM NOTE
+++NOT AN INMATE++++++    AUTHORIZATION REQUEST for EMERGENCY DEPARTMENT ADMISSION        PATIENT INFORMATION  Name:             Alec Medrano  MRN#:            [id number]        ADMISSION INFORMATION  CLASS:          Inpatient   DOS:               7/9/2023        ADMISSION DIAGNOSIS AND HOSPITAL PROBLEMS    Problems Addressed this Visit    None  Visit Diagnoses       Hypothermia due to cold environment    -  Primary    Hypernatremia        Encephalopathy acute        Bradycardia        Hypotension, unspecified hypotension type        Decreased activities of daily living (ADL)              Diagnoses         Codes Comments    Hypothermia due to cold environment    -  Primary ICD-10-CM: T68.XXXA  ICD-9-CM: 991.6     Hypernatremia     ICD-10-CM: E87.0  ICD-9-CM: 276.0     Encephalopathy acute     ICD-10-CM: G93.40  ICD-9-CM: 348.30     Bradycardia     ICD-10-CM: R00.1  ICD-9-CM: 427.89     Hypotension, unspecified hypotension type     ICD-10-CM: I95.9  ICD-9-CM: 458.9     Decreased activities of daily living (ADL)     ICD-10-CM: Z78.9  ICD-9-CM: V49.89                  ADMITTING PROVIDER INFORMATION  Name/NPI       Maurizio Aguilar MD [[id number]]  Phone:            Phone: [phone]        RENDERING FACILITY  Name:             [facility]   NPI:                 [id number]  TID:                 [id number]  Address:        [address] [place], [place] [address] [address]        CASE MANAGEMENT CONTACT INFORMATION  Name:             ELAINE Hong  Phone:            [phone]  Fax:                [phone]        Alec Medrano (53 y.o. Male)       Date of Birth   1970    Social Security Number       Address   [address] [place], [place] [address] [address]    Home Phone   [phone]    MRN   [id number]       Christianity   None    Marital Status   Unknown                            Admission Date   7/9/23    Admission Type   Emergency    Admitting Provider   Maurizio Aguilar MD    Attending Provider   Maurizio Aguilar,  "MD    Department, Room/Bed   [facility] INTENSIVE CARE UNIT, I06/1       Discharge Date       Discharge Disposition       Discharge Destination                                 Attending Provider: Maurizio Aguilar MD    Allergies: No Known Allergies    Isolation: Contact   Infection: MRSA (07/09/23)   Code Status: CPR    Ht: 154.9 cm (61\")   Wt: 93.8 kg (206 lb 12.7 oz)    Admission Cmt: None   Principal Problem: Hypothermia [T68.XXXA]                   Active Insurance as of 7/9/2023       Primary Coverage       Payor Plan Insurance Group Employer/Plan Group    HUMANA MEDICARE REPLACEMENT HUMANA [facility] HMO MEDICARE REPLACEMENT NON PAR [id number]       Payor Plan Address Payor Plan Phone Number Payor Plan Fax Number Effective Dates       1/1/2023 - None Entered      Subscriber Name Subscriber Birth Date Member ID       DAFNE DIAS 1970 [id number]               Secondary Coverage       Payor Plan Insurance Group Employer/Plan Group    [id number]        Payor Plan Address Payor Plan Phone Number Payor Plan Fax Number Effective Dates    [address]   7/9/2023 - None Entered    [place] [address]         Subscriber Name Subscriber Birth Date Member ID       DAFNE DIAS 1970 [id number]               Tertiary Coverage       Payor Plan Insurance Group Employer/Plan Group    CORRECTIONAL FACILITY [facility]       Coverage Address Coverage Phone Number Coverage Fax Number Effective Dates    [address] [phone]  7/9/2023 - 7/9/2023    [place] [address]         Subscriber Name Subscriber Birth Date Member ID       DAFNE DIAS 1970 [id number]                     Emergency Contacts        (Rel.) Home Phone Work Phone Mobile Phone    SAQIBJOSELITO (Sister) -- -- [phone]    DARRON FAIR (Sister) -- -- [phone]        [facility]  [place] [employer]             History & Physical        Maurizio Agiular MD at " 07/09/23 0715           [facility]    Date of admission: 7/9/2023  Patient Name: Alec Medrano   1970  Primary Care Physician:  Provider, No Known    Subjective     Chief Complaint   Patient presents with    Cold Exposure        HPI:  53 y.o. who presents After being found down in his residential cell shivering/unresponsive his temp axillary at that time was 82 Fahrenheit and is bradycardic to approximately 29 systolic blood pressure approximately 90.  Sent for EMS at that time.  Patient apparently been in solitary confinement for suicidal ideations were psychosis related issues and had not had any of his medications.  APS reports were filed in the ER.    Patient initiated on Arctic sun, warming blanket, Pal hugger and warmed fluids and being admitted to the ICU for further monitoring.    Patient altered able to partially mumble his name but otherwise no hx able to be obtained      PFSH notable for: unable to determine at this time given ams reportedly a psych hx        Objective     Vitals:   Heart Rate:  [29-36] 31  Resp:  [14-20] 14  BP: ()/() 100/70  Flow (L/min):  [2] 2    Physical Exam   Altered ill appearing   Under warming blanket  Pupils equal and round, sluggish but reactive, mildly dilated, occasionally tracking, dry mucosa, 2L NC in mouth   Bradycardia 35-40 during exam, regular, b/l le edema  Roncorous breath sounds, diminished slightly in b/l bases, tachypneic   Abd obese, not rigid, no apparent tenderness, +BS   Only able to barely try and answer name, faintly squeezed my hand to command otherwise no able to participate and can't answer  No tremors, partial withdrawal to painful stimuli    Result Review    Vital signs, labs and any relevant imaging reviewed.     Assessment / Plan     Severe hypothermia  Shock, suspect from hypothermia, possibly also sepsis secondary to undetermined source  Hypotension  Acute hypoxemia requiring at least 2 L nasal cannula  initially  Symptomatic Bradycardia   Prolonged QTc 644 6 and EF acute hypothermia  Hyponatremia  Hypokalemia  Morbid Obesity  Reported psychiatric hx  Suspect underlying CHF      continue rewarming with fluids, blanket,  trend electrolytes, monitor and replace closely  hypernatremia initially suspect from dehydration/hemoconcentration  receiving initial rewarming with iv NS initially, repeat bmp and adjust accordingly   Iv dopamine given shock in ed, likely change to levophed if needed/once in icu  Pulmcrit/dr edwards consulted for additional assistance, appreciate help  check echo, ct chest, ct head  tox screen  panculture, empiric vanc/cefepime for now, wbc wnl, likely narrow based on trend   consider lp/eeg if mentation doesnt continue to show improvement with further resuscitation   Obtaining additional records from nursing home, review outpt meds    Admit to icu  Discussed initial management and workup with ED provider    DVT prophylaxis:  Medical DVT prophylaxis orders are present.  Code: full    CBC          7/9/2023    06:11   CBC   WBC 6.18    RBC 3.97    Hemoglobin 12.0    Hematocrit 35.8    MCV 90.2    MCH 30.2    MCHC 33.5    RDW 15.1    Platelets 215        CMP          7/9/2023    06:11   CMP   Glucose 185    BUN 28    Creatinine 0.78    EGFR 107.3    Sodium 153    Potassium 3.2    Chloride 119    Calcium 9.1    Total Protein 5.2    Albumin 2.9    Globulin 2.3    Total Bilirubin <0.2    Alkaline Phosphatase 90    AST (SGOT) 40    ALT (SGPT) 37    Albumin/Globulin Ratio 1.3    BUN/Creatinine Ratio 35.9    Anion Gap 9.0    Patient is critically ill due to shock, severe hypothermia, severe bradycardia and multiple issues as above.  I have spent >35 minutes of critical care time reviewing documentation, pertinent labs, imaging studies, examining the patient, modifying care plan, and discussing patient's condition and care plan with the rn/ed md/pulm        Electronically signed by Maurizio Aguilar MD at 07/09/23  0749          Emergency Department Notes        Donta Franco MD at 07/09/23 0616          Time: 6:16 AM EDT  Date of encounter:  7/9/2023  Independent Historian/Clinical History and Information was obtained by:   EMS    History is limited by: Altered Mental Status    Chief Complaint: Unresponsive      History of Present Illness:  Patient is a 53 y.o. year old male who presents to the emergency department for evaluation of unresponsive    EMS reports that the patient was picked up at the [facility] after he was found unresponsive.  The patient was apparently in isolation cell sleeping unclothed on a concrete floor.  They found him to be minimally responsive bradycardic hypotensive and hypothermic.  Found his heart rate was approximately 29 on their initial assessment and his blood pressure was 90 systolic.  They found his axillary temperature to be 82 øF      Patient Care Team  Primary Care Provider: Provider, No Known    Past Medical History:     No Known Allergies  Past Medical History:   Diagnosis Date    COPD (chronic obstructive pulmonary disease)     Diabetes mellitus     Hyperlipidemia     Hypertension     Hypotension     Pneumothorax     Pulmonary emboli     Unresponsive episode      Past Surgical History:   Procedure Laterality Date    SPLENECTOMY       Family History   Problem Relation Age of Onset    Depression Mother     Cancer Mother     Alcohol abuse Mother     Hypertension Father     Diabetes Father     Depression Father     COPD Father     Cancer Father     Alcohol abuse Father     Mental illness Sister     Learning disabilities Sister     Kidney disease Sister     Hyperlipidemia Sister     Diabetes Sister     Depression Sister     COPD Sister     Bleeding Disorder Sister     Hypertension Brother     Hyperlipidemia Brother     Early death Brother     Diabetes Brother     Depression Brother     Alcohol abuse Brother     Depression Son     Alcohol abuse Son     Depression Maternal Aunt      "Cancer Maternal Aunt     Depression Maternal Uncle     Cancer Maternal Uncle     Depression Paternal Aunt     Cancer Paternal Aunt     Depression Paternal Uncle     Cancer Paternal Uncle     Depression Maternal Grandmother     Depression Maternal Grandfather     Diabetes Paternal Grandmother     Depression Paternal Grandmother     Depression Paternal Grandfather        Home Medications:  Prior to Admission medications    Not on File        Social History:   Social History     Tobacco Use    Smoking status: Former     Packs/day: 1.50     Years: 30.00     Pack years: 45.00     Types: Cigarettes     Passive exposure: Past    Smokeless tobacco: Never   Vaping Use    Vaping Use: Never used   Substance Use Topics    Alcohol use: Not Currently    Drug use: Never         Review of Systems:  Review of Systems   Unable to perform ROS: Mental status change      Physical Exam:  /67   Pulse 73   Temp 96.9 øF (36.1 øC) (Bladder)   Resp 21   Ht 154.9 cm (61\")   Wt 93.8 kg (206 lb 12.7 oz)   SpO2 92%   BMI 39.07 kg/mý     Physical Exam  Vitals and nursing note reviewed.   Constitutional:       General: He is not in acute distress.     Appearance: He is toxic-appearing.      Comments: Patient appears unkempt    Generalized anasarca   HENT:      Head: Normocephalic and atraumatic.      Jaw: There is normal jaw occlusion.   Eyes:      General: Lids are normal.      Extraocular Movements: Extraocular movements intact.      Pupils: Pupils are equal, round, and reactive to light.      Comments: Periorbital edema bilaterally   Cardiovascular:      Rate and Rhythm: Regular rhythm. Bradycardia present.      Pulses: Normal pulses.      Heart sounds: Normal heart sounds.   Pulmonary:      Effort: Pulmonary effort is normal. No respiratory distress.      Breath sounds: Normal breath sounds. No wheezing or rhonchi.   Abdominal:      General: Abdomen is flat.      Palpations: Abdomen is soft.      Tenderness: There is no abdominal " tenderness. There is no guarding or rebound.   Musculoskeletal:         General: Normal range of motion.      Cervical back: Normal range of motion and neck supple.      Right lower leg: Edema present.      Left lower leg: Edema present.   Skin:     Comments: Bulla bilateral knees    Subacute abrasion bilateral knees and feet    Soiled skin   Neurological:      Mental Status: He is lethargic and disoriented.   Psychiatric:         Mood and Affect: Affect is flat.              Procedures:  Procedures      Medical Decision Making:      Comorbidities that affect care:    Unknown    External Notes reviewed:    None      The following orders were placed and all results were independently analyzed by me:  Orders Placed This Encounter   Procedures    Blood Culture - Blood,    Blood Culture - Blood,    MRSA Screen, PCR (Inpatient) - Swab, Nares    S. Pneumo Ag Urine or CSF - Urine, Urine, Clean Catch    Legionella Antigen, Urine - Urine, Urine, Clean Catch    XR Chest 1 View    CT Head Without Contrast    CT Chest Without Contrast Diagnostic    McCook Draw    Lactic Acid, Plasma    Comprehensive Metabolic Panel    Protime-INR    aPTT    Magnesium    Phosphorus    C-reactive Protein    Blood Gas, Arterial -With Co-Ox Panel: Yes    Urinalysis With Microscopic If Indicated (No Culture) - Urine, Catheter    CBC Auto Differential    CK    TSH Rfx On Abnormal To Free T4    STAT Lactic Acid, Reflex    TSH Rfx On Abnormal To Free T4    Urine Drug Screen - Urine, Clean Catch    Toxicology Screen, Serum    Comprehensive Metabolic Panel    Magnesium    Phosphorus    High Sensitivity Troponin T    BNP    High Sensitivity Troponin T 2Hr    Procalcitonin    HIV-1 & HIV-2 Antibodies    Hepatitis Panel, Acute    Hemoglobin A1c    Vitamin B12    Folate    Salicylate Level    Acetaminophen Level    CK    CBC Auto Differential    Scan Slide    NPO Diet NPO Type: Strict NPO    Undress & Gown    Continuous Pulse Oximetry    Measure Blood  Pressure    Vital Signs    Vital Signs    Notify Provider (With Default Parameters)    Activity - Ad Renea    Up in Chair    Intake & Output    Weigh Patient    Oral Care    Saline Lock & Maintain IV Access    Code Status and Medical Interventions:    Hospitalist (on-call MD unless specified)    Inpatient Pulmonology Consult    Inpatient Case Management  Consult    OT Consult: Eval & Treat    PT Consult: Eval & Treat Functional Mobility Below Baseline    Oxygen Therapy- Nasal Cannula; Titrate 1-6 LPM Per SpO2; 90 - 95%    POC Glucose STAT    POC Glucose Once    ECG 12 Lead Bradycardia    Adult Transthoracic Echo Complete W/ Cont if Necessary Per Protocol    Insert peripheral IV    Insert Large Bore Peripheral IV    Insert 2nd Large Bore Peripheral IV    Insert Peripheral IV    Inpatient Admission    Green Top (Gel)    Lavender Top    Gold Top - SST    Light Blue Top    CBC & Differential    CBC & Differential       Medications Given in the Emergency Department:  Medications   sodium chloride 0.9 % flush 10 mL (has no administration in time range)   sodium chloride 0.9 % flush 10 mL (has no administration in time range)   DOPamine 400 mg in 250 mL D5W infusion (0 mcg/kg/min x 93.8 kg Intravenous Stopped 7/9/23 1700)   sodium chloride 0.9 % flush 10 mL (10 mL Intravenous Given 7/10/23 0805)   sodium chloride 0.9 % flush 10 mL (has no administration in time range)   sodium chloride 0.9 % infusion 40 mL (has no administration in time range)   acetaminophen (TYLENOL) tablet 650 mg (has no administration in time range)   ondansetron (ZOFRAN) injection 4 mg (has no administration in time range)   heparin (porcine) 5000 UNIT/ML injection 5,000 Units (5,000 Units Subcutaneous Given 7/10/23 0600)   sennosides-docusate (PERICOLACE) 8.6-50 MG per tablet 2 tablet (2 tablets Oral Not Given 7/10/23 0805)     And   polyethylene glycol (MIRALAX) packet 17 g (has no administration in time range)     And   bisacodyl  (DULCOLAX) EC tablet 5 mg (has no administration in time range)     And   bisacodyl (DULCOLAX) suppository 10 mg (has no administration in time range)   Pharmacy to dose vancomycin (has no administration in time range)   Pharmacy to Dose Cefepime (has no administration in time range)   cefepime (MAXIPIME) 2 g/100 mL 0.9% NS (mbp) (2 g Intravenous New Bag 7/10/23 0600)   vancomycin 1500 mg/250 mL 0.9% NS IVPB (BHS) (1,500 mg Intravenous New Bag 7/10/23 0804)   norepinephrine (LEVOPHED) 8 mg in 250 mL NS infusion (premix) (0 mcg/kg/min x 93.8 kg Intravenous Stopped 7/10/23 0805)   lactated ringers infusion (100 mL/hr Intravenous New Bag 7/10/23 0110)   dextrose (D50W) (25 g/50 mL) IV injection 50 mL (50 mL Intravenous Given 7/10/23 0829)   dextrose (D50W) 50 % (25 g/50 mL) IV injection  - ADS Override Pull (  Not Given 7/10/23 0830)   cefepime (MAXIPIME) 2 g/100 mL 0.9% NS (mbp) (0 g Intravenous Stopped 7/9/23 0715)   vancomycin IVPB 2000 mg in 0.9% Sodium Chloride (premix) 500 mL (0 mg Intravenous Stopped 7/9/23 1015)   sodium chloride 0.9 % bolus 2,000 mL (0 mL Intravenous Stopped 7/9/23 0637)   magnesium sulfate 2g/50 mL (PREMIX) infusion (2 g Intravenous New Bag 7/9/23 0954)   potassium chloride 10 mEq in 100 mL IVPB (10 mEq Intravenous New Bag 7/9/23 1342)        ED Course:    ED Course as of 07/10/23 0859   Sun Jul 09, 2023   0646 My interpretation of EKG: Junctional rhythm 24, Eubanks J waves present [JS]   0710 Patient's significant hypothermia is difficult to transcutaneous pace.  On further research it appears primary treatment for his bradycardia and hypotension is rewarming. [JS]   0711 I have asked the [facility] nurse examiner's to see the patient due to my concern for the patient's significant neglect and poor living conditions while in custody. [JS]   0711 The patient was discussed with hospitalist for admission.  Patient will require ICU admission due to his critical condition. [JS]      ED Course User  "Index  [JS] Donta Franco MD       Labs:    Lab Results (last 24 hours)       Procedure Component Value Units Date/Time    STAT Lactic Acid, Reflex [[id number]]  (Normal) Collected: 07/09/23 1049    Specimen: Blood Updated: 07/09/23 1113     Lactate 1.3 mmol/L     Toxicology Screen, Serum [[id number]] Collected: 07/09/23 1049    Specimen: Blood Updated: 07/09/23 1053    High Sensitivity Troponin T 2Hr [[id number]]  (Abnormal) Collected: 07/09/23 1049    Specimen: Blood Updated: 07/09/23 1134     HS Troponin T 46 ng/L      Troponin T Delta 12 ng/L     Narrative:      High Sensitive Troponin T Reference Range:  <10.0 ng/L- Negative Female for AMI  <15.0 ng/L- Negative Male for AMI  >=10 - Abnormal Female indicating possible myocardial injury.  >=15 - Abnormal Male indicating possible myocardial injury.   Clinicians would have to utilize clinical acumen, EKG, Troponin, and serial changes to determine if it is an Acute Myocardial Infarction or myocardial injury due to an underlying chronic condition.         Procalcitonin [[id number]]  (Normal) Collected: 07/09/23 1049    Specimen: Blood Updated: 07/09/23 1452     Procalcitonin 0.04 ng/mL     Narrative:      As a Marker for Sepsis (Non-Neonates):    1. <0.5 ng/mL represents a low risk of severe sepsis and/or septic shock.  2. >2 ng/mL represents a high risk of severe sepsis and/or septic shock.    As a Marker for Lower Respiratory Tract Infections that require antibiotic therapy:    PCT on Admission    Antibiotic Therapy       6-12 Hrs later    >0.5                Strongly Recommended  >0.25 - <0.5        Recommended  0.1 - 0.25          Discouraged              Remeasure/reassess PCT  <0.1                Strongly Discouraged     Remeasure/reassess PCT    As 28 day mortality risk marker: \"Change in Procalcitonin Result\" (>80% or <=80%) if Day 0 (or Day 1) and Day 4 values are available. Refer to http://www.[employer]-pct-calculator.com    Change in PCT <=80%  A decrease of PCT " levels below or equal to 80% defines a positive change in PCT test result representing a higher risk for 28-day all-cause mortality of patients diagnosed with severe sepsis for septic shock.    Change in PCT >80%  A decrease of PCT levels of more than 80% defines a negative change in PCT result representing a lower risk for 28-day all-cause mortality of patients diagnosed with severe sepsis or septic shock.    This test is Prognostic not Diagnostic, if elevated correlate with clinical findings before administering antibiotic treatment.        Salicylate Level [[id number]]  (Normal) Collected: 07/09/23 1049    Specimen: Blood Updated: 07/09/23 1503     Salicylate <0.3 mg/dL     Acetaminophen Level [[id number]]  (Normal) Collected: 07/09/23 1049    Specimen: Blood Updated: 07/09/23 1503     Acetaminophen <5.0 mcg/mL     Urine Drug Screen - Urine, Clean Catch [[id number]]  (Normal) Collected: 07/09/23 1531    Specimen: Urine, Clean Catch Updated: 07/09/23 1624     Amphet/Methamphet, Screen Negative     Barbiturates Screen, Urine Negative     Benzodiazepine Screen, Urine Negative     Cocaine Screen, Urine Negative     Opiate Screen Negative     THC, Screen, Urine Negative     Methadone Screen, Urine Negative     Oxycodone Screen, Urine Negative     Fentanyl, Urine Negative    Narrative:      Negative Thresholds Per Drugs Screened:    Amphetamines                 500 ng/ml  Barbiturates                 200 ng/ml  Benzodiazepines              100 ng/ml  Cocaine                      300 ng/ml  Methadone                    300 ng/ml  Opiates                      300 ng/ml  Oxycodone                    100 ng/ml  THC                           50 ng/ml  Fentanyl                       5 ng/ml      The Normal Value for all drugs tested is negative. This report includes final unconfirmed screening results to be used for medical treatment purposes only. Unconfirmed results must not be used for non-medical purposes such as employment  or legal testing. Clinical consideration should be applied to any drug of abuse test, particularly when unconfirmed results are used.            MRSA Screen, PCR (Inpatient) - Swab, Nares [[id number]]  (Abnormal) Collected: 07/09/23 1531    Specimen: Swab from Nares Updated: 07/09/23 1801     MRSA PCR MRSA Detected    Narrative:      The negative predictive value of this diagnostic test is high and should only be used to consider de-escalating anti-MRSA therapy. A positive result may indicate colonization with MRSA and must be correlated clinically.    S. Pneumo Ag Urine or CSF - Urine, Urine, Clean Catch [[id number]]  (Normal) Collected: 07/09/23 1531    Specimen: Urine, Clean Catch Updated: 07/09/23 1643     Strep Pneumo Ag Negative    Legionella Antigen, Urine - Urine, Urine, Clean Catch [[id number]]  (Normal) Collected: 07/09/23 1531    Specimen: Urine, Clean Catch Updated: 07/09/23 1643     LEGIONELLA ANTIGEN, URINE Negative    HIV-1 & HIV-2 Antibodies [[id number]]  (Normal) Collected: 07/09/23 1705    Specimen: Blood Updated: 07/09/23 1744     HIV-1/ HIV-2 Non-Reactive    Hepatitis Panel, Acute [[id number]] Collected: 07/09/23 1705    Specimen: Blood Updated: 07/09/23 1711    Comprehensive Metabolic Panel [[id number]]  (Abnormal) Collected: 07/10/23 0237    Specimen: Blood Updated: 07/10/23 0348     Glucose 65 mg/dL      BUN 23 mg/dL      Creatinine 1.12 mg/dL      Sodium 155 mmol/L      Potassium 3.8 mmol/L      Chloride 124 mmol/L      CO2 21.3 mmol/L      Calcium 8.8 mg/dL      Total Protein 5.1 g/dL      Albumin 2.6 g/dL      ALT (SGPT) 50 U/L      AST (SGOT) 56 U/L      Alkaline Phosphatase 80 U/L      Total Bilirubin 0.2 mg/dL      Globulin 2.5 gm/dL      A/G Ratio 1.0 g/dL      BUN/Creatinine Ratio 20.5     Anion Gap 9.7 mmol/L      eGFR 79.0 mL/min/1.73     Narrative:      GFR Normal >60  Chronic Kidney Disease <60  Kidney Failure <15      CBC & Differential [[id number]]  (Abnormal) Collected: 07/10/23 0233     Specimen: Blood Updated: 07/10/23 0318    Narrative:      The following orders were created for panel order CBC & Differential.  Procedure                               Abnormality         Status                     ---------                               -----------         ------                     CBC Auto Differential[[id number]]        Abnormal            Final result               Scan Slide[[id number]]                                       Final result                 Please view results for these tests on the individual orders.    Magnesium [[id number]]  (Normal) Collected: 07/10/23 0237    Specimen: Blood Updated: 07/10/23 0348     Magnesium 1.7 mg/dL     Phosphorus [[id number]]  (Normal) Collected: 07/10/23 0237    Specimen: Blood Updated:

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
## 2023-07-10 NOTE — PLAN OF CARE
Goal Outcome Evaluation:  Plan of Care Reviewed With: patient        Progress: no change  Outcome Evaluation: Patient has experienced decline in function from baseline status, presenting w/ deficits related to strength, balance, safety awareness, transfers and mobility that impede patient independence with activities of daily living.  Patient would benefit from skilled Occupational Therapy intervention to maxamize patient safety, and promote return to baseline independence.      Anticipated Discharge Disposition (OT): sub acute care setting

## 2023-07-10 NOTE — THERAPY EVALUATION
Patient Name: Alec Medrano  : 1970    MRN: [id number]                              Today's Date: 7/10/2023       Admit Date: 2023    Visit Dx:     ICD-10-CM ICD-9-CM   1. Hypothermia due to cold environment  T68.XXXA 991.6   2. Hypernatremia  E87.0 276.0   3. Encephalopathy acute  G93.40 348.30   4. Bradycardia  R00.1 427.89   5. Hypotension, unspecified hypotension type  I95.9 458.9   6. Decreased activities of daily living (ADL)  Z78.9 V49.89     Patient Active Problem List   Diagnosis    Hypothermia     Past Medical History:   Diagnosis Date    COPD (chronic obstructive pulmonary disease)     Diabetes mellitus     Hyperlipidemia     Hypertension     Hypotension     Pneumothorax     Pulmonary emboli     Unresponsive episode      Past Surgical History:   Procedure Laterality Date    SPLENECTOMY        General Information       Row Name 07/10/23 1255          OT Time and Intention    Document Type evaluation  -ES     Mode of Treatment individual therapy;occupational therapy  -ES       Row Name 07/10/23 1255          General Information    Patient Profile Reviewed yes  -ES     Prior Level of Function independent:;ADL's;all household mobility;community mobility  Patient reports independent with BADLs at baseline, sister assist with IADLs. No device for functional mobility. Standing shower completion. Barton in stance. Endorses home O2 use at night. Denies recent falls.  -ES     Existing Precautions/Restrictions fall;oxygen therapy device and L/min  -ES     Barriers to Rehab none identified  -ES       Row Name 07/10/23 1255          Occupational Profile    Reason for Services/Referral (Occupational Profile) Patient is 53 yr old male admitted [facility] on 2023 after being found unresponsive in long term cell with bradycardia. OT evaluation and treatment ordered d/t recent decline in ADLs/transfer ability and discharge planning recommendations. No previous OT services for current condition.  -ES        Row Name 07/10/23 1255          Living Environment    People in Home sibling(s)  [facility]. Patient reports will discharge home with sister (prior living situation).  -ES       Row Name 07/10/23 1255          Home Main Entrance    Number of Stairs, Main Entrance none  -ES       Row Name 07/10/23 1255          Stairs Within Home, Primary    Number of Stairs, Within Home, Primary none  -ES       Row Name 07/10/23 1255          Cognition    Orientation Status (Cognition) oriented to;person;situation  Patient is pleasant and cooperative. No family present to verify patient provided prior level.  -ES       Row Name 07/10/23 1255          Safety Issues, Functional Mobility    Impairments Affecting Function (Mobility) balance;strength;endurance/activity tolerance;shortness of breath  -ES               User Key  (r) = Recorded By, (t) = Taken By, (c) = Cosigned By      Initials Name Provider Type    Radha Ramos, OTR/L, CSRS Occupational Therapist                     Mobility/ADL's       Row Name 07/10/23 1302          Bed Mobility    Bed Mobility supine-sit;sit-supine  -ES     Supine-Sit Ruby (Bed Mobility) maximum assist (25% patient effort);1 person assist  -ES     Sit-Supine Ruby (Bed Mobility) maximum assist (25% patient effort);1 person assist  -ES     Comment, (Bed Mobility) Patient endorses dizziness seated edge of bed. Patient BP reading 70/60, returned to supine  -ES       Row Name 07/10/23 1302          Transfers    Comment, (Transfers) unable to assess secondary to low BP, symptomatic seated EOB  -ES       Row Name 07/10/23 1302          Functional Mobility    Functional Mobility- Ind. Level not tested  -ES       Row Name 07/10/23 1302          Activities of Daily Living    BADL Assessment/Intervention bathing;upper body dressing;lower body dressing;grooming;feeding;toileting  -ES       Row Name 07/10/23 1302          Bathing Assessment/Intervention    Ruby  Level (Bathing) bathing skills;moderate assist (50% patient effort)  -ES       Row Name 07/10/23 1302          Upper Body Dressing Assessment/Training    Venus Level (Upper Body Dressing) upper body dressing skills;minimum assist (75% patient effort)  -ES       Row Name 07/10/23 1302          Lower Body Dressing Assessment/Training    Venus Level (Lower Body Dressing) lower body dressing skills;maximum assist (25% patient effort)  -ES       Row Name 07/10/23 1302          Grooming Assessment/Training    Venus Level (Grooming) grooming skills;set up  -ES       Row Name 07/10/23 1302          Self-Feeding Assessment/Training    Venus Level (Feeding) feeding skills;set up  -ES       Row Name 07/10/23 1302          Toileting Assessment/Training    Venus Level (Toileting) toileting skills;dependent (less than 25% patient effort)  -ES     Comment, (Toileting) solis catheter in place at time of assessment  -ES               User Key  (r) = Recorded By, (t) = Taken By, (c) = Cosigned By      Initials Name Provider Type    ES Radha Short, OTR/L, CSRS Occupational Therapist                   Obj/Interventions       Row Name 07/10/23 1305          Vision Assessment/Intervention    Visual Impairment/Limitations WFL  -ES       Row Name 07/10/23 1305          Range of Motion Comprehensive    General Range of Motion no range of motion deficits identified  -ES       Row Name 07/10/23 1305          Strength Comprehensive (MMT)    General Manual Muscle Testing (MMT) Assessment upper extremity strength deficits identified;lower extremity strength deficits identified  -ES     Comment, General Manual Muscle Testing (MMT) Assessment BUEs assessed 3+/5  -ES       Row Name 07/10/23 1305          Motor Skills    Motor Skills coordination;functional endurance  -ES     Coordination lower extremity;moderate impairment  -ES     Functional Endurance fair minus  -ES       Row Name 07/10/23 1305           Balance    Balance Assessment sitting dynamic balance  -ES     Dynamic Sitting Balance standby assist  -ES     Position, Sitting Balance unsupported;sitting edge of bed  -ES     Comment, Balance unable to assess standing blanace secondary to symptomatic low BP seated edge of bed  -ES               User Key  (r) = Recorded By, (t) = Taken By, (c) = Cosigned By      Initials Name Provider Type    ES Radha Short, OTR/L, CSRS Occupational Therapist                   Goals/Plan       Row Name 07/10/23 1308          Transfer Goal 1 (OT)    Activity/Assistive Device (Transfer Goal 1, OT) transfers, all;walker, rolling  -ES     Helen Level/Cues Needed (Transfer Goal 1, OT) modified independence  -ES     Time Frame (Transfer Goal 1, OT) long term goal (LTG);10 days  -ES       Row Name 07/10/23 1308          Bathing Goal 1 (OT)    Activity/Device (Bathing Goal 1, OT) bathing skills, all  -ES     Helen Level/Cues Needed (Bathing Goal 1, OT) modified independence  -ES     Time Frame (Bathing Goal 1, OT) long term goal (LTG);10 days  -ES       Row Name 07/10/23 1308          Dressing Goal 1 (OT)    Activity/Device (Dressing Goal 1, OT) dressing skills, all  -ES     Helen/Cues Needed (Dressing Goal 1, OT) modified independence  -ES     Time Frame (Dressing Goal 1, OT) long term goal (LTG);10 days  -ES       Row Name 07/10/23 1308          Toileting Goal 1 (OT)    Activity/Device (Toileting Goal 1, OT) toileting skills, all  -ES     Helen Level/Cues Needed (Toileting Goal 1, OT) modified independence  -ES     Time Frame (Toileting Goal 1, OT) long term goal (LTG);10 days  -ES       Row Name 07/10/23 1308          Grooming Goal 1 (OT)    Activity/Device (Grooming Goal 1, OT) grooming skills, all  -ES     Helen (Grooming Goal 1, OT) modified independence  -ES     Time Frame (Grooming Goal 1, OT) long term goal (LTG);10 days  -ES       Row Name 07/10/23 1308          Strength Goal 1 (OT)     Strength Goal 1 (OT) Pt will increase BUE strength to 4+/5 muscle strength for increased UE strength required for ADL transfers and task completion  -ES     Time Frame (Strength Goal 1, OT) long term goal (LTG);10 days  -ES       Row Name 07/10/23 1308          Problem Specific Goal 1 (OT)    Problem Specific Goal 1 (OT) Patient will demonstrate fair plus activity tolerance in preperation for independent ADL routine completion at time of discharge  -ES     Time Frame (Problem Specific Goal 1, OT) long term goal (LTG);10 days  -ES       Row Name 07/10/23 1308          Therapy Assessment/Plan (OT)    Planned Therapy Interventions (OT) activity tolerance training;BADL retraining;functional balance retraining;occupation/activity based interventions;patient/caregiver education/training;strengthening exercise;transfer/mobility retraining  -ES               User Key  (r) = Recorded By, (t) = Taken By, (c) = Cosigned By      Initials Name Provider Type    ES Radha Short, OTR/L, CSRS Occupational Therapist                   Clinical Impression       Row Name 07/10/23 1306          Plan of Care Review    Plan of Care Reviewed With patient  -ES     Progress no change  -ES     Outcome Evaluation Patient has experienced decline in function from baseline status, presenting w/ deficits related to strength, balance, safety awareness, transfers and mobility that impede patient independence with activities of daily living.  Patient would benefit from skilled Occupational Therapy intervention to maxamize patient safety, and promote return to baseline independence.  -ES       Row Name 07/10/23 1306          Therapy Assessment/Plan (OT)    Rehab Potential (OT) good, to achieve stated therapy goals  -ES     Criteria for Skilled Therapeutic Interventions Met (OT) yes;meets criteria;skilled treatment is necessary  -ES     Therapy Frequency (OT) 5 times/wk  -ES       Row Name 07/10/23 1306          Therapy Plan Review/Discharge Plan (OT)     Anticipated Discharge Disposition (OT) sub acute care setting  -ES       Row Name 07/10/23 1306          Vital Signs    Intra Systolic BP Rehab 70  -ES     Intra Treatment Diastolic BP 60  -ES     Post Systolic BP Rehab 109  -ES     Post Treatment Diastolic BP 75  -ES     Intratreatment Heart Rate (beats/min) 63  -ES     O2 Delivery Pre Treatment nasal cannula  -ES     Intra SpO2 (%) 95  -ES     O2 Delivery Intra Treatment nasal cannula  -ES     O2 Delivery Post Treatment nasal cannula  -ES       Row Name 07/10/23 1306          Positioning and Restraints    Pre-Treatment Position in bed  -ES     Post Treatment Position bed  -ES               User Key  (r) = Recorded By, (t) = Taken By, (c) = Cosigned By      Initials Name Provider Type    ES Radha Short, OTR/L, CSRS Occupational Therapist                   Outcome Measures       Row Name 07/10/23 1311          How much help from another is currently needed...    Putting on and taking off regular lower body clothing? 2  -ES     Bathing (including washing, rinsing, and drying) 2  -ES     Toileting (which includes using toilet bed pan or urinal) 1  -ES     Putting on and taking off regular upper body clothing 3  -ES     Taking care of personal grooming (such as brushing teeth) 3  -ES     Eating meals 4  -ES     AM-PAC 6 Clicks Score (OT) 15  -ES       Row Name 07/10/23 1311          Functional Assessment    Outcome Measure Options AM-PAC 6 Clicks Daily Activity (OT);Optimal Instrument  -ES       Row Name 07/10/23 1311          Optimal Instrument    Optimal Instrument Optimal - 3  -ES     Bending/Stooping 3  -ES     Balancing 3  -ES     Standing 0  unable to assess secondary to low BP  -ES     From the list, choose the 3 activities you would most like to be able to do without any difficulty Bending/stooping;Standing;Balancing  -ES     Total Score Optimal - 3 6  -ES               User Key  (r) = Recorded By, (t) = Taken By, (c) = Cosigned By      Initials Name  Provider Type    Radha Ramos OTR/L, CSRS Occupational Therapist                      OT Recommendation and Plan  Planned Therapy Interventions (OT): activity tolerance training, BADL retraining, functional balance retraining, occupation/activity based interventions, patient/caregiver education/training, strengthening exercise, transfer/mobility retraining  Therapy Frequency (OT): 5 times/wk  Plan of Care Review  Plan of Care Reviewed With: patient  Progress: no change  Outcome Evaluation: Patient has experienced decline in function from baseline status, presenting w/ deficits related to strength, balance, safety awareness, transfers and mobility that impede patient independence with activities of daily living.  Patient would benefit from skilled Occupational Therapy intervention to maxamize patient safety, and promote return to baseline independence.     Time Calculation:    Time Calculation- OT       Row Name 07/10/23 1313             Time Calculation- OT    OT Received On 07/10/23  -ES      OT Goal Re-Cert Due Date 07/19/23  -ES         Untimed Charges    OT Eval/Re-eval Minutes 35  -ES         Total Minutes    Untimed Charges Total Minutes 35  -ES       Total Minutes 35  -ES                User Key  (r) = Recorded By, (t) = Taken By, (c) = Cosigned By      Initials Name Provider Type    Radha Ramos OTR/L, CSRS Occupational Therapist                  Therapy Charges for Today       Code Description Service Date Service Provider Modifiers Qty    42737215720 HC OT EVAL LOW COMPLEXITY 3 7/10/2023 Radha Short OTR/L, FERMIN GO 1                 ANGIE Sin/L, RYLEES  7/10/2023

## 2023-07-11 LAB
ALBUMIN SERPL-MCNC: 2.3 G/DL (ref 3.5–5.2)
ALBUMIN/GLOB SERPL: 0.8 G/DL
ALP SERPL-CCNC: 80 U/L (ref 39–117)
ALT SERPL W P-5'-P-CCNC: 55 U/L (ref 1–41)
AMMONIA BLD-SCNC: 27 UMOL/L (ref 16–60)
ANION GAP SERPL CALCULATED.3IONS-SCNC: 8.6 MMOL/L (ref 5–15)
AST SERPL-CCNC: 49 U/L (ref 1–40)
BASOPHILS # BLD AUTO: 0.02 10*3/MM3 (ref 0–0.2)
BASOPHILS NFR BLD AUTO: 0.2 % (ref 0–1.5)
BILIRUB SERPL-MCNC: 0.2 MG/DL (ref 0–1.2)
BUN SERPL-MCNC: 19 MG/DL (ref 6–20)
BUN/CREAT SERPL: 15.7 (ref 7–25)
CALCIUM SPEC-SCNC: 8.4 MG/DL (ref 8.6–10.5)
CHLORIDE SERPL-SCNC: 118 MMOL/L (ref 98–107)
CO2 SERPL-SCNC: 19.4 MMOL/L (ref 22–29)
CREAT SERPL-MCNC: 1.21 MG/DL (ref 0.76–1.27)
DEPRECATED RDW RBC AUTO: 55.3 FL (ref 37–54)
EGFRCR SERPLBLD CKD-EPI 2021: 72 ML/MIN/1.73
EOSINOPHIL # BLD AUTO: 0.15 10*3/MM3 (ref 0–0.4)
EOSINOPHIL NFR BLD AUTO: 1.2 % (ref 0.3–6.2)
ERYTHROCYTE [DISTWIDTH] IN BLOOD BY AUTOMATED COUNT: 16.5 % (ref 12.3–15.4)
GLOBULIN UR ELPH-MCNC: 2.8 GM/DL
GLUCOSE BLDC GLUCOMTR-MCNC: 189 MG/DL (ref 70–99)
GLUCOSE BLDC GLUCOMTR-MCNC: 213 MG/DL (ref 70–99)
GLUCOSE BLDC GLUCOMTR-MCNC: 246 MG/DL (ref 70–99)
GLUCOSE SERPL-MCNC: 137 MG/DL (ref 65–99)
HCT VFR BLD AUTO: 36.2 % (ref 37.5–51)
HGB BLD-MCNC: 11.8 G/DL (ref 13–17.7)
IMM GRANULOCYTES # BLD AUTO: 0.07 10*3/MM3 (ref 0–0.05)
IMM GRANULOCYTES NFR BLD AUTO: 0.5 % (ref 0–0.5)
LYMPHOCYTES # BLD AUTO: 1.78 10*3/MM3 (ref 0.7–3.1)
LYMPHOCYTES NFR BLD AUTO: 13.7 % (ref 19.6–45.3)
MAGNESIUM SERPL-MCNC: 2 MG/DL (ref 1.6–2.6)
MCH RBC QN AUTO: 29.9 PG (ref 26.6–33)
MCHC RBC AUTO-ENTMCNC: 32.6 G/DL (ref 31.5–35.7)
MCV RBC AUTO: 91.9 FL (ref 79–97)
MONOCYTES # BLD AUTO: 1.36 10*3/MM3 (ref 0.1–0.9)
MONOCYTES NFR BLD AUTO: 10.4 % (ref 5–12)
NEUTROPHILS NFR BLD AUTO: 74 % (ref 42.7–76)
NEUTROPHILS NFR BLD AUTO: 9.64 10*3/MM3 (ref 1.7–7)
NRBC BLD AUTO-RTO: 0.2 /100 WBC (ref 0–0.2)
PHOSPHATE SERPL-MCNC: 2.1 MG/DL (ref 2.5–4.5)
PLATELET # BLD AUTO: 198 10*3/MM3 (ref 140–450)
PMV BLD AUTO: 12.4 FL (ref 6–12)
POTASSIUM SERPL-SCNC: 3.5 MMOL/L (ref 3.5–5.2)
PROT SERPL-MCNC: 5.1 G/DL (ref 6–8.5)
RBC # BLD AUTO: 3.94 10*6/MM3 (ref 4.14–5.8)
SODIUM SERPL-SCNC: 146 MMOL/L (ref 136–145)
WBC NRBC COR # BLD: 13.02 10*3/MM3 (ref 3.4–10.8)

## 2023-07-11 PROCEDURE — 25010000002 AMPICILLIN-SULBACTAM PER 1.5 G: Performed by: INTERNAL MEDICINE

## 2023-07-11 PROCEDURE — 82948 REAGENT STRIP/BLOOD GLUCOSE: CPT

## 2023-07-11 PROCEDURE — 84100 ASSAY OF PHOSPHORUS: CPT | Performed by: INTERNAL MEDICINE

## 2023-07-11 PROCEDURE — 94799 UNLISTED PULMONARY SVC/PX: CPT

## 2023-07-11 PROCEDURE — 25010000002 FUROSEMIDE PER 20 MG: Performed by: NURSE PRACTITIONER

## 2023-07-11 PROCEDURE — 99233 SBSQ HOSP IP/OBS HIGH 50: CPT | Performed by: INTERNAL MEDICINE

## 2023-07-11 PROCEDURE — 25010000002 HYDROCORTISONE SOD SUC (PF) 100 MG RECONSTITUTED SOLUTION: Performed by: INTERNAL MEDICINE

## 2023-07-11 PROCEDURE — 82140 ASSAY OF AMMONIA: CPT | Performed by: INTERNAL MEDICINE

## 2023-07-11 PROCEDURE — 25010000002 ENOXAPARIN PER 10 MG: Performed by: INTERNAL MEDICINE

## 2023-07-11 PROCEDURE — 83825 ASSAY OF MERCURY: CPT | Performed by: INTERNAL MEDICINE

## 2023-07-11 PROCEDURE — 85025 COMPLETE CBC W/AUTO DIFF WBC: CPT | Performed by: INTERNAL MEDICINE

## 2023-07-11 PROCEDURE — 97161 PT EVAL LOW COMPLEX 20 MIN: CPT

## 2023-07-11 PROCEDURE — 36415 COLL VENOUS BLD VENIPUNCTURE: CPT | Performed by: INTERNAL MEDICINE

## 2023-07-11 PROCEDURE — 25010000002 THIAMINE PER 100 MG: Performed by: INTERNAL MEDICINE

## 2023-07-11 PROCEDURE — 83655 ASSAY OF LEAD: CPT | Performed by: INTERNAL MEDICINE

## 2023-07-11 PROCEDURE — 80053 COMPREHEN METABOLIC PANEL: CPT | Performed by: INTERNAL MEDICINE

## 2023-07-11 PROCEDURE — 82175 ASSAY OF ARSENIC: CPT | Performed by: INTERNAL MEDICINE

## 2023-07-11 PROCEDURE — 83735 ASSAY OF MAGNESIUM: CPT | Performed by: INTERNAL MEDICINE

## 2023-07-11 RX ORDER — RISPERIDONE 2 MG/1
2 TABLET ORAL 2 TIMES DAILY
Status: DISCONTINUED | OUTPATIENT
Start: 2023-07-11 | End: 2023-07-12

## 2023-07-11 RX ORDER — FENTANYL/ROPIVACAINE/NS/PF 2-625MCG/1
15 PLASTIC BAG, INJECTION (ML) EPIDURAL
Status: COMPLETED | OUTPATIENT
Start: 2023-07-11 | End: 2023-07-11

## 2023-07-11 RX ORDER — FUROSEMIDE 10 MG/ML
40 INJECTION INTRAMUSCULAR; INTRAVENOUS ONCE
Status: COMPLETED | OUTPATIENT
Start: 2023-07-11 | End: 2023-07-11

## 2023-07-11 RX ADMIN — RISPERIDONE 2 MG: 2 TABLET ORAL at 12:50

## 2023-07-11 RX ADMIN — CYANOCOBALAMIN TAB 500 MCG 1000 MCG: 500 TAB at 10:37

## 2023-07-11 RX ADMIN — SODIUM CHLORIDE 3 G: 900 INJECTION INTRAVENOUS at 21:37

## 2023-07-11 RX ADMIN — RISPERIDONE 2 MG: 2 TABLET ORAL at 21:18

## 2023-07-11 RX ADMIN — FUROSEMIDE 40 MG: 10 INJECTION, SOLUTION INTRAMUSCULAR; INTRAVENOUS at 10:46

## 2023-07-11 RX ADMIN — POTASSIUM PHOSPHATE, MONOBASIC AND POTASSIUM PHOSPHATE, DIBASIC 15 MMOL: 224; 236 INJECTION, SOLUTION, CONCENTRATE INTRAVENOUS at 10:37

## 2023-07-11 RX ADMIN — SODIUM CHLORIDE 3 G: 900 INJECTION INTRAVENOUS at 10:37

## 2023-07-11 RX ADMIN — SODIUM CHLORIDE 3 G: 900 INJECTION INTRAVENOUS at 04:58

## 2023-07-11 RX ADMIN — MULTIPLE VITAMINS W/ MINERALS TAB 1 TABLET: TAB at 10:37

## 2023-07-11 RX ADMIN — SODIUM CHLORIDE 3 G: 900 INJECTION INTRAVENOUS at 16:24

## 2023-07-11 RX ADMIN — HYDROCORTISONE SODIUM SUCCINATE 100 MG: 100 INJECTION, POWDER, FOR SOLUTION INTRAMUSCULAR; INTRAVENOUS at 10:38

## 2023-07-11 RX ADMIN — WHITE PETROLATUM 1 APPLICATION: 1.75 OINTMENT TOPICAL at 21:18

## 2023-07-11 RX ADMIN — Medication 10 ML: at 10:38

## 2023-07-11 RX ADMIN — HYDROCORTISONE SODIUM SUCCINATE 100 MG: 100 INJECTION, POWDER, FOR SOLUTION INTRAMUSCULAR; INTRAVENOUS at 18:16

## 2023-07-11 RX ADMIN — DEXTROSE MONOHYDRATE 100 ML/HR: 100 INJECTION, SOLUTION INTRAVENOUS at 00:48

## 2023-07-11 RX ADMIN — THIAMINE HYDROCHLORIDE 500 MG: 100 INJECTION, SOLUTION INTRAMUSCULAR; INTRAVENOUS at 17:46

## 2023-07-11 RX ADMIN — ENOXAPARIN SODIUM 40 MG: 100 INJECTION SUBCUTANEOUS at 06:24

## 2023-07-11 RX ADMIN — POTASSIUM PHOSPHATE, MONOBASIC AND POTASSIUM PHOSPHATE, DIBASIC 15 MMOL: 224; 236 INJECTION, SOLUTION, CONCENTRATE INTRAVENOUS at 12:50

## 2023-07-11 RX ADMIN — Medication 10 ML: at 21:18

## 2023-07-11 RX ADMIN — THIAMINE HYDROCHLORIDE 500 MG: 100 INJECTION, SOLUTION INTRAMUSCULAR; INTRAVENOUS at 03:04

## 2023-07-11 RX ADMIN — THIAMINE HYDROCHLORIDE 500 MG: 100 INJECTION, SOLUTION INTRAMUSCULAR; INTRAVENOUS at 10:38

## 2023-07-11 NOTE — PLAN OF CARE
Goal Outcome Evaluation:  Plan of Care Reviewed With: patient           Outcome Evaluation: Pt presents with deficits in BLE strength, functional independence, and ambulation distance. He will benefit from skilled PT intervention addressing these deficits.      Anticipated Discharge Disposition (PT): inpatient rehabilitation facility

## 2023-07-11 NOTE — SIGNIFICANT NOTE
Wound Eval / Progress Noted    LORI Curry     Patient Name: Alec Medrano  : 1970  MRN: [id number]  Today's Date: 2023                 Admit Date: 2023    Visit Dx:    ICD-10-CM ICD-9-CM   1. Hypothermia due to cold environment  T68.XXXA 991.6   2. Hypernatremia  E87.0 276.0   3. Encephalopathy acute  G93.40 348.30   4. Bradycardia  R00.1 427.89   5. Hypotension, unspecified hypotension type  I95.9 458.9   6. Decreased activities of daily living (ADL)  Z78.9 V49.89   7. Difficulty walking  R26.2 719.7         Hypothermia        Past Medical History:   Diagnosis Date    COPD (chronic obstructive pulmonary disease)     Diabetes mellitus     Hyperlipidemia     Hypertension     Hypotension     Pneumothorax     Pulmonary emboli     Unresponsive episode         Past Surgical History:   Procedure Laterality Date    SPLENECTOMY           Physical Assessment:  Wound 23 1015 Left anterior knee Abrasion (Active)   Wound Image   23 1455   Dressing Appearance intact;moist drainage 23 1455   Closure None 23 1455   Base black eschar;moist;red;slough 23 145   Periwound intact;redness;pink 23 145   Periwound Temperature warm 23 145   Periwound Skin Turgor soft 23 145   Edges open 23 145   Wound Length (cm) 2 cm 23 145   Wound Width (cm) 1 cm 23 145   Wound Depth (cm) 0.1 cm 23 145   Wound Surface Area (cm^2) 2 cm^2 23 1455   Wound Volume (cm^3) 0.2 cm^3 23 1455   Drainage Characteristics/Odor serous;serosanguineous 23 145   Drainage Amount scant 23 145   Care, Wound cleansed with;irrigated with;sterile normal saline 23 1455   Dressing Care dressing applied;dressing moistened;silver impregnated;hydrofiber;silicone;border dressing 23 145   Periwound Care absorptive dressing applied 23 1455       Wound 23 1015 Right anterior greater trochanter Abrasion (Active)   Wound Image   23 0482    Dressing Appearance intact;moist drainage 07/11/23 1455   Closure None 07/11/23 1455   Base red;slough;moist 07/11/23 1455   Red (%), Wound Tissue Color 50 07/11/23 1455   Yellow (%), Wound Tissue Color 50 07/11/23 1455   Periwound blanchable;redness 07/11/23 1455   Periwound Temperature warm 07/11/23 1455   Periwound Skin Turgor soft 07/11/23 1455   Edges open 07/11/23 1455   Wound Length (cm) 2.7 cm 07/11/23 1455   Wound Width (cm) 3 cm 07/11/23 1455   Wound Depth (cm) 0.1 cm 07/11/23 1455   Wound Surface Area (cm^2) 8.1 cm^2 07/11/23 1455   Wound Volume (cm^3) 0.81 cm^3 07/11/23 1455   Drainage Characteristics/Odor serosanguineous 07/11/23 1455   Drainage Amount small 07/11/23 1455   Care, Wound cleansed with;irrigated with;sterile normal saline 07/11/23 1455   Dressing Care dressing applied;silicone;silver impregnated;hydrofiber;border dressing 07/11/23 1455   Periwound Care absorptive dressing applied 07/11/23 1455       Wound 07/11/23 1455 Left anterior second toe Neuropathic (Active)   Wound Image   07/11/23 1455   Dressing Appearance open to air 07/11/23 1455   Closure None 07/11/23 1455   Base non-blanchable;red;scab 07/11/23 1455   Periwound intact;pink 07/11/23 1455   Periwound Temperature warm 07/11/23 1455   Periwound Skin Turgor soft 07/11/23 1455   Edges open 07/11/23 1455   Wound Length (cm) 0.4 cm 07/11/23 1455   Wound Width (cm) 0.2 cm 07/11/23 1455   Wound Depth (cm) 0.1 cm 07/11/23 1455   Wound Surface Area (cm^2) 0.08 cm^2 07/11/23 1455   Wound Volume (cm^3) 0.008 cm^3 07/11/23 1455   Drainage Amount none 07/11/23 1455   Care, Wound cleansed with;sterile normal saline 07/11/23 3186        Wound Check / Follow-up:  Patient seen today for wound consult. Patient currently in SICU. 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 His mood is labile and shifts depending on the character he is portraying.   He was initially very cooperative and pleasant and then while looking at his  feet, he was becoming agitated. After primary RN came back to room, re-introduced me and reassured him I was there to help, he was agreeable to continued assessment.   Patient with injury to left anterior knee. He has an area with red moist tissue and yellow slough to left anterior knee and also an area with darkened crusting to lateral aspect of anterior knee. Cleansed with NS and gauze. Will recommend daily dressing with silver impregnated hydrofiber and silicone border dressing.  Patient also has an open wound to his right lateral hip. Possible trauma or pressure due to location. Pressure would be Stage II. Wound base is red and moist with minimal slough scattered but with base visible. Cleansed with NS and gauze. Recommending daily dressing with silver impregnated hydrofiber and silicone border dressing.   Edema noted to BLE. He has scattered areas of crusting and excoriations to bilateral feet. Initial concern for bites. No tracks or openings noted between toes with this assessment. He denies itching or scratching feet but he is a poor historian. Will recommend topical treatment at this time.   He does have a crusted ulceration to left 2nd toe and has diabetes.   Buttocks is blanchable and reddened.     Impression: Traumatic injury to left knee; Trauma vs. Pressure to right lateral hip; Excoriations and crusting to bilateral feet. Ulcer to left 2nd toe    Short term goals:  Regain skin integrity. Daily dressing changes. Skin protection, moisture prevention, pressure reduction. Topical skin care / hygiene.     Kandice Kwok RN    7/11/2023    16:16 EDT
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
## 2023-07-11 NOTE — CONSULTS
07/11/23 1015   Coping/Psychosocial   Additional Documentation Spiritual Care (Group)   Spiritual Care   Spiritual Care Source  initiative   Spiritual Care Interventions other (see comments)  (updated on pt's condition and POC by medical team)   Spiritual Care Visit Type initial   Spiritual Care Request other (see comments)  (pt is alert to self only.)
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
## 2023-07-11 NOTE — THERAPY EVALUATION
Acute Care - Physical Therapy Initial Evaluation  LORI Curry     Patient Name: Alec Medrano  Admit date: 2023     Referring Physician: Chris Rivera MD     Surgery Date:* No surgery found *        : 1970  MRN: [id number]  Today's Date: 2023      Visit Dx:     ICD-10-CM ICD-9-CM   1. Hypothermia due to cold environment  T68.XXXA 991.6   2. Hypernatremia  E87.0 276.0   3. Encephalopathy acute  G93.40 348.30   4. Bradycardia  R00.1 427.89   5. Hypotension, unspecified hypotension type  I95.9 458.9   6. Decreased activities of daily living (ADL)  Z78.9 V49.89   7. Difficulty walking  R26.2 719.7     Patient Active Problem List   Diagnosis    Hypothermia     Past Medical History:   Diagnosis Date    COPD (chronic obstructive pulmonary disease)     Diabetes mellitus     Hyperlipidemia     Hypertension     Hypotension     Pneumothorax     Pulmonary emboli     Unresponsive episode      Past Surgical History:   Procedure Laterality Date    SPLENECTOMY       PT Assessment (last 12 hours)       PT Evaluation and Treatment       Row Name 23 1012          Physical Therapy Time and Intention    Subjective Information no complaints  -DP     Document Type evaluation  -DP     Mode of Treatment individual therapy;physical therapy  -DP     Patient Effort good  -DP     Symptoms Noted During/After Treatment none  -DP       Row Name 23 1012          General Information    Patient Observations alert;cooperative;agree to therapy  -DP     Prior Level of Function independent:;all household mobility;community mobility  -DP     Existing Precautions/Restrictions fall;oxygen therapy device and L/min  -DP     Barriers to Rehab none identified  -DP       Row Name 23 1012          Living Environment    Current Living Arrangements home  -DP     Home Accessibility other (see comments)  Unable to obtain due to patient cognition  -DP     People in Home sibling(s)  -DP       Row Name 23 1012          Home Use of  Assistive/Adaptive Equipment    Equipment Currently Used at Home none  -DP       Row Name 07/11/23 1012          Range of Motion (ROM)    Range of Motion ROM is WFL  -DP       Row Name 07/11/23 1012          Strength (Manual Muscle Testing)    Strength (Manual Muscle Testing) bilateral lower extremities;other (see comments)  RLE: 5/5 hip flexion, 5/5 knee extension, 4/5 knee flexion, 4-/5 Ankle DF. 4+/5 hip flexion, 4+/5 knee extension, 4/5 knee flexion, 5/5 Ankle DF.  -DP       Row Name 07/11/23 1012          Bed Mobility    Bed Mobility rolling right;supine-sit;scooting/bridging  -DP     Rolling Right Fountain (Bed Mobility) standby assist  -DP     Scooting/Bridging Fountain (Bed Mobility) minimum assist (75% patient effort)  -DP     Supine-Sit Fountain (Bed Mobility) minimum assist (75% patient effort)  -DP     Bed Mobility, Safety Issues cognitive deficits limit understanding;decreased use of legs for bridging/pushing  -DP     Assistive Device (Bed Mobility) bed rails;draw sheet;head of bed elevated  -DP       Row Name 07/11/23 1012          Transfers    Transfers sit-stand transfer;stand-sit transfer  -DP       Row Name 07/11/23 1012          Sit-Stand Transfer    Sit-Stand Fountain (Transfers) moderate assist (50% patient effort)  -DP     Assistive Device (Sit-Stand Transfers) walker, front-wheeled  -DP       Row Name 07/11/23 1012          Stand-Sit Transfer    Stand-Sit [place] (Transfers) moderate assist (50% patient effort)  -DP     Assistive Device (Stand-Sit Transfers) walker, front-wheeled  -DP     Comment, (Stand-Sit Transfer) Pt displays impulsivity, bending forward to reach for chair insstead of approaching it safely.  -DP       Row Name 07/11/23 1012          Gait/Stairs (Locomotion)    Gait/Stairs Locomotion gait/ambulation independence;gait/ambulation assistive device;distance ambulated  -DP     [place]ountain Level (Gait) moderate assist (50% patient effort)  -DP     Assistive  Device (Gait) walker, front-wheeled  -DP     Distance in Feet (Gait) --  4 steps to chair from bed  -DP       Row Name 07/11/23 1012          Safety Issues, Functional Mobility    Safety Issues Affecting Function (Mobility) ability to follow commands;awareness of need for assistance;impulsivity;insight into deficits/self-awareness;judgment;problem-solving;safety precaution awareness;safety precautions follow-through/compliance;sequencing abilities  -DP     Impairments Affecting Function (Mobility) balance;strength;endurance/activity tolerance;shortness of breath;cognition  -DP     Cognitive Impairments, Mobility Safety/Performance awareness, need for assistance;impulsivity;insight into deficits/self-awareness;judgment;problem-solving/reasoning;safety precaution awareness;safety precaution follow-through  -DP       Row Name 07/11/23 1012          Balance    Balance Assessment standing dynamic balance  -DP     Dynamic Standing Balance moderate assist  -DP     Position/Device Used, Standing Balance supported;walker, front-wheeled  -DP       Row Name             Wound 07/09/23 1015 Left anterior knee Abrasion    Wound - Properties Group Placement Date: 07/09/23  -KP Placement Time: 1015  -KP Present on Hospital Admission: Y  -KP Side: Left  -KP Orientation: anterior  -KP Location: knee  -KP Primary Wound Type: Abrasion  -KP    Retired Wound - Properties Group Placement Date: 07/09/23  -KP Placement Time: 1015  -KP Present on Hospital Admission: Y  -KP Side: Left  -KP Orientation: anterior  -KP Location: knee  -KP Primary Wound Type: Abrasion  -KP    Retired Wound - Properties Group Date first assessed: 07/09/23  -KP Time first assessed: 1015  -KP Present on Hospital Admission: Y  -KP Side: Left  -KP Location: knee  -KP Primary Wound Type: Abrasion  -KP      Row Name             Wound 07/09/23 1015 Right anterior greater trochanter Abrasion    Wound - Properties Group Placement Date: 07/09/23  -KP Placement Time: 1015   -KP Side: Right  -KP Orientation: anterior  -KP Location: greater trochanter  -KP Primary Wound Type: Abrasion  -KP    Retired Wound - Properties Group Placement Date: 07/09/23  -KP Placement Time: 1015  -KP Side: Right  -KP Orientation: anterior  -KP Location: greater trochanter  -KP Primary Wound Type: Abrasion  -KP    Retired Wound - Properties Group Date first assessed: 07/09/23  -KP Time first assessed: 1015  -KP Side: Right  -KP Location: greater trochanter  -KP Primary Wound Type: Abrasion  -KP      Row Name 07/11/23 1012          Plan of Care Review    Plan of Care Reviewed With patient  -DP     Outcome Evaluation Pt presents with deficits in BLE strength, functional independence, and ambulation distance. He will benefit from skilled PT intervention addressing these deficits.  -DP       Row Name 07/11/23 1012          Positioning and Restraints    Pre-Treatment Position sitting in chair/recliner  -DP     Post Treatment Position chair  -DP     In Chair reclined  -DP       Row Name 07/11/23 1012          Therapy Assessment/Plan (PT)    Rehab Potential (PT) good, to achieve stated therapy goals  -DP     Criteria for Skilled Interventions Met (PT) yes  -DP     Therapy Frequency (PT) daily  -DP     Predicted Duration of Therapy Intervention (PT) 10 days  -DP     Problem List (PT) problems related to;balance;mobility;strength  -DP     Activity Limitations Related to Problem List (PT) unable to ambulate safely;unable to transfer safely  -DP       Row Name 07/11/23 1012          Therapy Plan Review/Discharge Plan (PT)    Therapy Plan Review (PT) evaluation/treatment results reviewed;care plan/treatment goals reviewed  -DP       Row Name 07/11/23 1012          Physical Therapy Goals    Bed Mobility Goal Selection (PT) bed mobility, PT goal 1  -DP     Transfer Goal Selection (PT) transfer, PT goal 1  -DP     Gait Training Goal Selection (PT) gait training, PT goal 1  -DP       Row Name 07/11/23 1012          Bed  Mobility Goal 1 (PT)    Activity/Assistive Device (Bed Mobility Goal 1, PT) bed mobility activities, all  -DP     [place] Level/Cues Needed (Bed Mobility Goal 1, PT) independent  -DP       Row Name 07/11/23 1012          Transfer Goal 1 (PT)    Activity/Assistive Device (Transfer Goal 1, PT) transfers, all;walker, rolling  -DP     [place] Level/Cues Needed (Transfer Goal 1, PT) contact guard required  -DP     Time Frame (Transfer Goal 1, PT) long term goal (LTG);10 days  -DP       Row Name 07/11/23 1012          Gait Training Goal 1 (PT)    Activity/Assistive Device (Gait Training Goal 1, PT) gait (walking locomotion);walker, rolling  -DP     [place] Level (Gait Training Goal 1, PT) contact guard required  -DP     Distance (Gait Training Goal 1, PT) 100  -DP     Time Frame (Gait Training Goal 1, PT) long term goal (LTG);10 days  -DP               User Key  (r) = Recorded By, (t) = Taken By, (c) = Cosigned By      Initials Name Provider Type    Angelika Hill, RN Registered Nurse    Rukhsana Walker, PT Physical Therapist                    Physical Therapy Education       Title: PT OT SLP Therapies (Done)       Topic: Physical Therapy (Done)       Point: Mobility training (Done)       Learning Progress Summary             Patient Acceptance, E,TB, VU by ELLEN at 7/11/2023 1033                         Point: Home exercise program (Done)       Learning Progress Summary             Patient Acceptance, E,TB, VU by ELLEN at 7/11/2023 1033                         Point: Body mechanics (Done)       Learning Progress Summary             Patient Acceptance, E,TB, VU by ELLEN at 7/11/2023 1033                         Point: Precautions (Done)       Learning Progress Summary             Patient Acceptance, E,TB, VU by ELLEN at 7/11/2023 1033                                         User Key       Initials Effective Dates Name Provider Type Discipline    ELLEN 06/03/21 -  Rukhsana Vega PT Physical Therapist PT                   PT Recommendation and Plan  Anticipated Discharge Disposition (PT): inpatient rehabilitation facility  Planned Therapy Interventions (PT): balance training, bed mobility training, gait training, home exercise program, motor coordination training, neuromuscular re-education, patient/family education, postural re-education, ROM (range of motion), stair training, strengthening, transfer training, stretching  Therapy Frequency (PT): daily  Plan of Care Reviewed With: patient  Outcome Evaluation: Pt presents with deficits in BLE strength, functional independence, and ambulation distance. He will benefit from skilled PT intervention addressing these deficits.   Outcome Measures       Row Name 07/11/23 1000             How much help from another person do you currently need...    Turning from your back to your side while in flat bed without using bedrails? 4  -DP      Moving from lying on back to sitting on the side of a flat bed without bedrails? 3  -DP      Moving to and from a bed to a chair (including a wheelchair)? 2  -DP      Standing up from a chair using your arms (e.g., wheelchair, bedside chair)? 2  -DP      Climbing 3-5 steps with a railing? 2  -DP      To walk in hospital room? 2  -DP      AM-PAC 6 Clicks Score (PT) 15  -DP         Functional Assessment    Outcome Measure Options AM-PAC 6 Clicks Basic Mobility (PT)  -DP                User Key  (r) = Recorded By, (t) = Taken By, (c) = Cosigned By      Initials Name Provider Type    Rukhsana Walker, PT Physical Therapist                     Time Calculation:    PT Charges       Row Name 07/11/23 1013             Time Calculation    PT Received On 07/11/23  -DP      PT Goal Re-Cert Due Date 07/20/23  -DP         Untimed Charges    PT Eval/Re-eval Minutes 37  -DP         Total Minutes    Untimed Charges Total Minutes 37  -DP       Total Minutes 37  -DP                User Key  (r) = Recorded By, (t) = Taken By, (c) = Cosigned By      Initials Name  Provider Type    DP Rukhsana Vega PT Physical Therapist                      PT G-Codes  Outcome Measure Options: AM-PAC 6 Clicks Basic Mobility (PT)  AM-PAC 6 Clicks Score (PT): 15  AM-PAC 6 Clicks Score (OT): 15    Rukhsana Vega PT  7/11/2023

## 2023-07-12 LAB
ALBUMIN SERPL-MCNC: 2.8 G/DL (ref 3.5–5.2)
ALBUMIN/GLOB SERPL: 0.9 G/DL
ALP SERPL-CCNC: 112 U/L (ref 39–117)
ALT SERPL W P-5'-P-CCNC: 49 U/L (ref 1–41)
ANION GAP SERPL CALCULATED.3IONS-SCNC: 6.9 MMOL/L (ref 5–15)
AST SERPL-CCNC: 31 U/L (ref 1–40)
BASOPHILS # BLD AUTO: 0.01 10*3/MM3 (ref 0–0.2)
BASOPHILS NFR BLD AUTO: 0.1 % (ref 0–1.5)
BILIRUB SERPL-MCNC: 0.2 MG/DL (ref 0–1.2)
BUN SERPL-MCNC: 20 MG/DL (ref 6–20)
BUN/CREAT SERPL: 14.7 (ref 7–25)
CALCIUM SPEC-SCNC: 9.1 MG/DL (ref 8.6–10.5)
CHLORIDE SERPL-SCNC: 110 MMOL/L (ref 98–107)
CO2 SERPL-SCNC: 26.1 MMOL/L (ref 22–29)
CREAT SERPL-MCNC: 1.36 MG/DL (ref 0.76–1.27)
DEPRECATED RDW RBC AUTO: 54.4 FL (ref 37–54)
EGFRCR SERPLBLD CKD-EPI 2021: 62.6 ML/MIN/1.73
EOSINOPHIL # BLD AUTO: 0.14 10*3/MM3 (ref 0–0.4)
EOSINOPHIL NFR BLD AUTO: 1.1 % (ref 0.3–6.2)
ERYTHROCYTE [DISTWIDTH] IN BLOOD BY AUTOMATED COUNT: 16.4 % (ref 12.3–15.4)
GLOBULIN UR ELPH-MCNC: 3.1 GM/DL
GLUCOSE BLDC GLUCOMTR-MCNC: 126 MG/DL (ref 70–99)
GLUCOSE BLDC GLUCOMTR-MCNC: 144 MG/DL (ref 70–99)
GLUCOSE BLDC GLUCOMTR-MCNC: 178 MG/DL (ref 70–99)
GLUCOSE BLDC GLUCOMTR-MCNC: 243 MG/DL (ref 70–99)
GLUCOSE SERPL-MCNC: 156 MG/DL (ref 65–99)
HCT VFR BLD AUTO: 35.8 % (ref 37.5–51)
HGB BLD-MCNC: 12 G/DL (ref 13–17.7)
IMM GRANULOCYTES # BLD AUTO: 0.09 10*3/MM3 (ref 0–0.05)
IMM GRANULOCYTES NFR BLD AUTO: 0.7 % (ref 0–0.5)
LYMPHOCYTES # BLD AUTO: 1.43 10*3/MM3 (ref 0.7–3.1)
LYMPHOCYTES NFR BLD AUTO: 11 % (ref 19.6–45.3)
MAGNESIUM SERPL-MCNC: 1.9 MG/DL (ref 1.6–2.6)
MCH RBC QN AUTO: 30.4 PG (ref 26.6–33)
MCHC RBC AUTO-ENTMCNC: 33.5 G/DL (ref 31.5–35.7)
MCV RBC AUTO: 90.6 FL (ref 79–97)
MONOCYTES # BLD AUTO: 1.28 10*3/MM3 (ref 0.1–0.9)
MONOCYTES NFR BLD AUTO: 9.8 % (ref 5–12)
NEUTROPHILS NFR BLD AUTO: 10.07 10*3/MM3 (ref 1.7–7)
NEUTROPHILS NFR BLD AUTO: 77.3 % (ref 42.7–76)
NRBC BLD AUTO-RTO: 0.4 /100 WBC (ref 0–0.2)
PHOSPHATE SERPL-MCNC: 1.9 MG/DL (ref 2.5–4.5)
PLATELET # BLD AUTO: 209 10*3/MM3 (ref 140–450)
PMV BLD AUTO: 12.6 FL (ref 6–12)
POTASSIUM SERPL-SCNC: 3.4 MMOL/L (ref 3.5–5.2)
PROT SERPL-MCNC: 5.9 G/DL (ref 6–8.5)
QT INTERVAL: 483 MS
RBC # BLD AUTO: 3.95 10*6/MM3 (ref 4.14–5.8)
SODIUM SERPL-SCNC: 143 MMOL/L (ref 136–145)
WBC NRBC COR # BLD: 13.02 10*3/MM3 (ref 3.4–10.8)

## 2023-07-12 PROCEDURE — 84100 ASSAY OF PHOSPHORUS: CPT | Performed by: INTERNAL MEDICINE

## 2023-07-12 PROCEDURE — 25010000002 HYDROCORTISONE SOD SUC (PF) 100 MG RECONSTITUTED SOLUTION: Performed by: INTERNAL MEDICINE

## 2023-07-12 PROCEDURE — 82948 REAGENT STRIP/BLOOD GLUCOSE: CPT

## 2023-07-12 PROCEDURE — 97110 THERAPEUTIC EXERCISES: CPT

## 2023-07-12 PROCEDURE — 99233 SBSQ HOSP IP/OBS HIGH 50: CPT | Performed by: INTERNAL MEDICINE

## 2023-07-12 PROCEDURE — 85025 COMPLETE CBC W/AUTO DIFF WBC: CPT | Performed by: INTERNAL MEDICINE

## 2023-07-12 PROCEDURE — 94799 UNLISTED PULMONARY SVC/PX: CPT

## 2023-07-12 PROCEDURE — 83735 ASSAY OF MAGNESIUM: CPT | Performed by: INTERNAL MEDICINE

## 2023-07-12 PROCEDURE — 36415 COLL VENOUS BLD VENIPUNCTURE: CPT | Performed by: INTERNAL MEDICINE

## 2023-07-12 PROCEDURE — 25010000002 AMPICILLIN-SULBACTAM PER 1.5 G: Performed by: INTERNAL MEDICINE

## 2023-07-12 PROCEDURE — 25010000002 ENOXAPARIN PER 10 MG: Performed by: INTERNAL MEDICINE

## 2023-07-12 PROCEDURE — 25010000002 THIAMINE PER 100 MG: Performed by: INTERNAL MEDICINE

## 2023-07-12 PROCEDURE — 80053 COMPREHEN METABOLIC PANEL: CPT | Performed by: INTERNAL MEDICINE

## 2023-07-12 PROCEDURE — 99232 SBSQ HOSP IP/OBS MODERATE 35: CPT | Performed by: INTERNAL MEDICINE

## 2023-07-12 PROCEDURE — 25010000002 FUROSEMIDE PER 20 MG: Performed by: INTERNAL MEDICINE

## 2023-07-12 PROCEDURE — 25010000002 HALOPERIDOL LACTATE PER 5 MG: Performed by: PHYSICIAN ASSISTANT

## 2023-07-12 PROCEDURE — 25010000002 MAGNESIUM SULFATE IN D5W 1G/100ML (PREMIX) 1-5 GM/100ML-% SOLUTION: Performed by: INTERNAL MEDICINE

## 2023-07-12 RX ORDER — RISPERIDONE 3 MG/1
3 TABLET ORAL 2 TIMES DAILY
Status: DISCONTINUED | OUTPATIENT
Start: 2023-07-12 | End: 2023-07-19

## 2023-07-12 RX ORDER — MAGNESIUM SULFATE 1 G/100ML
1 INJECTION INTRAVENOUS ONCE
Status: COMPLETED | OUTPATIENT
Start: 2023-07-12 | End: 2023-07-12

## 2023-07-12 RX ORDER — POTASSIUM CHLORIDE 750 MG/1
30 CAPSULE, EXTENDED RELEASE ORAL EVERY 4 HOURS
Status: COMPLETED | OUTPATIENT
Start: 2023-07-12 | End: 2023-07-12

## 2023-07-12 RX ORDER — FENTANYL/ROPIVACAINE/NS/PF 2-625MCG/1
15 PLASTIC BAG, INJECTION (ML) EPIDURAL
Status: COMPLETED | OUTPATIENT
Start: 2023-07-12 | End: 2023-07-12

## 2023-07-12 RX ORDER — FUROSEMIDE 10 MG/ML
40 INJECTION INTRAMUSCULAR; INTRAVENOUS ONCE
Status: COMPLETED | OUTPATIENT
Start: 2023-07-12 | End: 2023-07-12

## 2023-07-12 RX ORDER — HALOPERIDOL 5 MG/ML
2 INJECTION INTRAMUSCULAR ONCE
Status: COMPLETED | OUTPATIENT
Start: 2023-07-12 | End: 2023-07-12

## 2023-07-12 RX ADMIN — HYDROCORTISONE SODIUM SUCCINATE 100 MG: 100 INJECTION, POWDER, FOR SOLUTION INTRAMUSCULAR; INTRAVENOUS at 02:40

## 2023-07-12 RX ADMIN — WHITE PETROLATUM 1 APPLICATION: 1.75 OINTMENT TOPICAL at 09:40

## 2023-07-12 RX ADMIN — Medication 10 ML: at 20:25

## 2023-07-12 RX ADMIN — HALOPERIDOL LACTATE 2 MG: 5 INJECTION, SOLUTION INTRAMUSCULAR at 21:30

## 2023-07-12 RX ADMIN — ENOXAPARIN SODIUM 40 MG: 100 INJECTION SUBCUTANEOUS at 06:01

## 2023-07-12 RX ADMIN — WHITE PETROLATUM 1 APPLICATION: 1.75 OINTMENT TOPICAL at 21:30

## 2023-07-12 RX ADMIN — RISPERIDONE 2 MG: 2 TABLET ORAL at 09:38

## 2023-07-12 RX ADMIN — THIAMINE HYDROCHLORIDE 500 MG: 100 INJECTION, SOLUTION INTRAMUSCULAR; INTRAVENOUS at 10:07

## 2023-07-12 RX ADMIN — THIAMINE HYDROCHLORIDE 500 MG: 100 INJECTION, SOLUTION INTRAMUSCULAR; INTRAVENOUS at 18:28

## 2023-07-12 RX ADMIN — RISPERIDONE 3 MG: 3 TABLET ORAL at 20:25

## 2023-07-12 RX ADMIN — SODIUM CHLORIDE 3 G: 900 INJECTION INTRAVENOUS at 10:06

## 2023-07-12 RX ADMIN — MULTIPLE VITAMINS W/ MINERALS TAB 1 TABLET: TAB at 09:39

## 2023-07-12 RX ADMIN — THIAMINE HYDROCHLORIDE 500 MG: 100 INJECTION, SOLUTION INTRAMUSCULAR; INTRAVENOUS at 01:07

## 2023-07-12 RX ADMIN — POTASSIUM CHLORIDE 30 MEQ: 750 CAPSULE, EXTENDED RELEASE ORAL at 13:07

## 2023-07-12 RX ADMIN — POTASSIUM CHLORIDE 30 MEQ: 750 CAPSULE, EXTENDED RELEASE ORAL at 09:39

## 2023-07-12 RX ADMIN — CYANOCOBALAMIN TAB 500 MCG 1000 MCG: 500 TAB at 10:19

## 2023-07-12 RX ADMIN — SENNOSIDES AND DOCUSATE SODIUM 2 TABLET: 50; 8.6 TABLET ORAL at 20:25

## 2023-07-12 RX ADMIN — SODIUM CHLORIDE 3 G: 900 INJECTION INTRAVENOUS at 16:32

## 2023-07-12 RX ADMIN — MAGNESIUM SULFATE 1 G: 1 INJECTION INTRAVENOUS at 10:05

## 2023-07-12 RX ADMIN — POTASSIUM PHOSPHATE, MONOBASIC AND POTASSIUM PHOSPHATE, DIBASIC 15 MMOL: 224; 236 INJECTION, SOLUTION, CONCENTRATE INTRAVENOUS at 10:06

## 2023-07-12 RX ADMIN — Medication 10 ML: at 10:07

## 2023-07-12 RX ADMIN — SENNOSIDES AND DOCUSATE SODIUM 2 TABLET: 50; 8.6 TABLET ORAL at 09:39

## 2023-07-12 RX ADMIN — SODIUM CHLORIDE 3 G: 900 INJECTION INTRAVENOUS at 03:42

## 2023-07-12 RX ADMIN — POTASSIUM PHOSPHATE, MONOBASIC AND POTASSIUM PHOSPHATE, DIBASIC 15 MMOL: 224; 236 INJECTION, SOLUTION, CONCENTRATE INTRAVENOUS at 13:07

## 2023-07-12 RX ADMIN — LIDOCAINE 2 PATCH: 700 PATCH TOPICAL at 09:38

## 2023-07-12 RX ADMIN — FUROSEMIDE 40 MG: 10 INJECTION, SOLUTION INTRAMUSCULAR; INTRAVENOUS at 09:39

## 2023-07-12 RX ADMIN — SODIUM CHLORIDE 3 G: 900 INJECTION INTRAVENOUS at 21:30

## 2023-07-12 NOTE — PROGRESS NOTES
Respiratory Therapist Broncho-Pulmonary Hygiene Progress Note      Patient Name:  Alec Medrano  YOB: 1970    Alec Medrano meets the qualification for Level 1 of the Bronco-Pulmonary Hygiene Protocol. This was based on my daily patient assessment and includes review of chest x-ray results, cough ability and quality, oxygenation, secretions or risk for secretion development and patient mobility.     Broncho-Pulmonary Hygiene Assessment:    Level of Movement: Actively changing positions without assistance  Alert/ oriented/ cooperative    Breath Sounds: Diminished and/or coarse rhonchi    Cough: Strong, effective    Chest X-Ray: Possible signs of consolidation and/or atelectasis or clear.     Sputum Productions: Able to produce small to moderate amount, of moderately thick secretions    History and Physical: None    SpO2 to Oxygen Need: greater than 92% on room air or  less than 3L nasal canula    Current SpO2 is: 98% on Room Air     Based on this information, I have completed the following interventions: Teach/Instruct patient on cough and deep breathe      Electronically signed by Cathy Guo RRT, 07/12/23, 11:43 AM EDT.

## 2023-07-12 NOTE — PROGRESS NOTES
" [facility]     Psychiatric Progress Note    Patient Name: Alec Medrano  : 1970  MRN: [id number]  Primary Care Physician:  Provider, No Known  Date of admission: 2023    Subjective   Subjective     Patient seen and chart reviewed, discussed with staff.    Chief Complaint: Delirium, psychosis      HPI:     Patient today is calm and cooperative.  He is lying in his bed and participates fully in interview.  However his thoughts continue to be disjointed.  Patient continues to state that he has multiple different personalities.  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  He is pleasant and somewhat jovial and laughing.  He states his sisters visited.  Patient tells me that he was in a coma, and that essentially is how [facility].  Patient has a history of learning disabilities, but his current presentation is much different than normal.  Family does not feel equipped to take care of him.    There is no agitation or combativeness.  He is denying any suicidal ideations.  Does not appear to be actively hallucinating but is delusional and tangential in his thoughts      Objective   Objective     Vitals:   Temp:  [97.7 øF (36.5 øC)] 97.7 øF (36.5 øC)  Heart Rate:  [81-94] 81  Resp:  [18-20] 20  BP: (118-160)/() 160/90  Flow (L/min):  [2] 2          Mental Status Exam:      Appearance:   Well-developed, well-nourished, calm, cooperative in interview  Reliability:   Poor  Eye Contact:   Good  Concentration/Focus:    Easily distracted  Behaviors:    No restlessness or agitation  Memory :    Oriented to person, place, month  Speech:    Normal rate and volume but bizarre  Language:   Bizarre  Mood :    \"Good\"  Affect:    Expansive,  Thought process:    Delusional, bizarre, tangential  Thought Content:    Denies suicidal or homicidal ideation, denies hallucinations and no evidence of " responding to internal stimuli  Insight:   Limited  Judgement:    Limited      Result Review    Result Review:  I have personally reviewed the results from the time of this admission to 7/12/2023 18:54 EDT and agree with these findings:  []  Laboratory  []  Microbiology  []  Radiology  []  EKG/Telemetry   []  Cardiology/Vascular   []  Pathology  []  Old records  []  Other:  Most notable findings include:     Lab Results (last 24 hours)       Procedure Component Value Units Date/Time    POC Glucose Once [[id number]]  (Abnormal) Collected: 07/12/23 1151    Specimen: Blood Updated: 07/12/23 1152     Glucose 126 mg/dL      Comment: Serial Number: [id number]:  [id number]       POC Glucose Once [[id number]]  (Abnormal) Collected: 07/12/23 0738    Specimen: Blood Updated: 07/12/23 0742     Glucose 243 mg/dL      Comment: Serial Number: [id number]:  [id number]       Blood Culture - Blood, Arm, Left [[id number]]  (Normal) Collected: 07/09/23 0611    Specimen: Blood from Arm, Left Updated: 07/12/23 0630     Blood Culture No growth at 3 days    Blood Culture - Blood, Arm, Right [[id number]]  (Normal) Collected: 07/09/23 0623    Specimen: Blood from Arm, Right Updated: 07/12/23 0630     Blood Culture No growth at 3 days    Phosphorus [[id number]]  (Abnormal) Collected: 07/12/23 0444    Specimen: Blood Updated: 07/12/23 0546     Phosphorus 1.9 mg/dL     Magnesium [[id number]]  (Normal) Collected: 07/12/23 0444    Specimen: Blood Updated: 07/12/23 0533     Magnesium 1.9 mg/dL     Comprehensive Metabolic Panel [[id number]]  (Abnormal) Collected: 07/12/23 0444    Specimen: Blood Updated: 07/12/23 0533     Glucose 156 mg/dL      BUN 20 mg/dL      Creatinine 1.36 mg/dL      Sodium 143 mmol/L      Potassium 3.4 mmol/L      Chloride 110 mmol/L      CO2 26.1 mmol/L      Calcium 9.1 mg/dL      Total Protein 5.9 g/dL      Albumin 2.8 g/dL      ALT (SGPT) 49 U/L      AST (SGOT) 31 U/L      Alkaline Phosphatase 112 U/L      Total  Bilirubin 0.2 mg/dL      Globulin 3.1 gm/dL      A/G Ratio 0.9 g/dL      BUN/Creatinine Ratio 14.7     Anion Gap 6.9 mmol/L      eGFR 62.6 mL/min/1.73     Narrative:      GFR Normal >60  Chronic Kidney Disease <60  Kidney Failure <15      CBC & Differential [[id number]]  (Abnormal) Collected: 07/12/23 0444    Specimen: Blood Updated: 07/12/23 0527    Narrative:      The following orders were created for panel order CBC & Differential.  Procedure                               Abnormality         Status                     ---------                               -----------         ------                     CBC Auto Differential[[id number]]        Abnormal            Final result                 Please view results for these tests on the individual orders.    CBC Auto Differential [[id number]]  (Abnormal) Collected: 07/12/23 0444    Specimen: Blood Updated: 07/12/23 0527     WBC 13.02 10*3/mm3      RBC 3.95 10*6/mm3      Hemoglobin 12.0 g/dL      Hematocrit 35.8 %      MCV 90.6 fL      MCH 30.4 pg      MCHC 33.5 g/dL      RDW 16.4 %      RDW-SD 54.4 fl      MPV 12.6 fL      Platelets 209 10*3/mm3      Neutrophil % 77.3 %      Lymphocyte % 11.0 %      Monocyte % 9.8 %      Eosinophil % 1.1 %      Basophil % 0.1 %      Immature Grans % 0.7 %      Neutrophils, Absolute 10.07 10*3/mm3      Lymphocytes, Absolute 1.43 10*3/mm3      Monocytes, Absolute 1.28 10*3/mm3      Eosinophils, Absolute 0.14 10*3/mm3      Basophils, Absolute 0.01 10*3/mm3      Immature Grans, Absolute 0.09 10*3/mm3      nRBC 0.4 /100 WBC     POC Glucose Once [[id number]]  (Abnormal) Collected: 07/12/23 0504    Specimen: Blood Updated: 07/12/23 0506     Glucose 178 mg/dL      Comment: Serial Number: [id number]:  [id number]       POC Glucose Once [[id number]]  (Abnormal) Collected: 07/12/23 0018    Specimen: Blood Updated: 07/12/23 0019     Glucose 144 mg/dL      Comment: Serial Number: [id number]:  [id number]                   Medications:    ampicillin-sulbactam, 3 g, Intravenous, Q6H  cyanocobalamin, 1,000 mcg, Intramuscular, Q28 Days  enoxaparin, 40 mg, Subcutaneous, Q24H  lidocaine, 2 patch, Transdermal, Q24H  mineral oil-hydrophilic petrolatum, 1 application , Topical, 2 times per day  multivitamin with minerals, 1 tablet, Oral, Daily  risperiDONE, 3 mg, Oral, BID  senna-docusate sodium, 2 tablet, Oral, BID  sodium chloride, 10 mL, Intravenous, Q12H  thiamine (B-1) IV, 500 mg, Intravenous, Q8H  vitamin B-12, 1,000 mcg, Oral, Daily          Assessment / Plan       Active Hospital Problems:  Active Hospital Problems    Diagnosis     **Hypothermia        Plan:     Increase Risperdal to 3 mg twice daily  Consider mood stabilizer such as Depakote if he continues to be expansive  We will follow make treatment recommendations as indicated  It is not clear if he needs an inpatient psychiatric admission      Disposition:  I expect patient to be discharged .    Part of this note may be an electronic transcription/translation of spoken language to printed text using the Dragon dictation system.         Electronically signed by Chris Green MD, 07/12/23, 6:54 PM EDT.
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
## 2023-07-12 NOTE — PROGRESS NOTES
Follow up for septic shock, lactic acidosis and acute hypoxic and hypercapnic respiratory failure.    Over the past 24 hours, patient transferred out of the ICU.    No acute events overnight.    This morning,   Sitting up in chair  On room air  Diuresing well  -3.4 L fluid balance  BP stable  Intermittently confused  Ammonia 247  No chest pain  No fever or chills  Weak and fatigued    Review of Systems  General: Fatigue, otherwise denied complaints  HEENT: Denied complaints  Respiratory: Denied complaints  Cardiovascular: Denied complaints  GI: Denied complaints  : Denied complaints  MSK: Weakness, otherwise denied complaints    Vitals:    07/11/23 1700 07/11/23 1839 07/11/23 1940 07/11/23 2313   BP:   118/61 128/80   BP Location:   Right arm Right arm   Patient Position:   Lying Lying   Pulse:    94   Resp:   18 18   Temp: 94.5 øF (34.7 øC)  97.7 øF (36.5 øC) 97.7 øF (36.5 øC)   TempSrc: Rectal  Oral Oral   SpO2:  96%  98%   Weight:       Height:           Physical Exam:  Vital Signs Reviewed   General: WDWN male, alert, intermittently confused, NAD on room air  HEENT:  PERRL, EOMI.    Neck:  Supple, no JVD, no thyromegaly  Chest:  good aeration, clear to auscultation bilaterally, tympanic to percussion bilaterally, no work of breathing noted  CV: RRR, no MGR, pulses 2+, equal  Abd:  Soft, NT, ND, + BS, no HSM  EXT:  no clubbing, no cyanosis, 2+ BLE pitting edema  Neuro:  A&Ox2, CN grossly intact, no focal deficits  Skin: No rashes or lesions noted    Results:        Lab 07/12/23  0444 07/11/23  0333 07/10/23  1842 07/10/23  1143 07/10/23  0237 07/09/23  0611   WBC 13.02* 13.02*  --   --  14.34* 6.18   HEMOGLOBIN 12.0* 11.8*  --   --  12.1* 12.0*   HEMATOCRIT 35.8* 36.2*  --   --  36.6* 35.8*   PLATELETS 209 198  --   --  234 215   SODIUM 143 146* 150* 153* 155* 153*   POTASSIUM 3.4* 3.5 4.2 3.4* 3.8 3.2*   CHLORIDE 110* 118* 122* 123* 124* 119*   CO2 26.1 19.4* 15.7* 22.9 21.3* 25.0   BUN 20 19 22* 22* 23* 28*    CREATININE 1.36* 1.21 1.29* 1.17 1.12 0.78   GLUCOSE 156* 137* 89 105* 65 185*   CALCIUM 9.1 8.4* 9.0 8.8 8.8 9.1   PHOSPHORUS 1.9* 2.1*  --   --  3.4 2.6   TOTAL PROTEIN 5.9* 5.1*  --   --  5.1* 5.2*   ALBUMIN 2.8* 2.3*  --   --  2.6* 2.9*   GLOBULIN 3.1 2.8  --   --  2.5 2.3     Impression:  Septic shock, present on admission  Altered mental status  Acute hypoxic hypercarbic respiratory failure  Hypothermia  Pneumonia likely aspiration pneumonia   Lactic acidosis  Hypokalemia  Hypomagnesia  Hypernatremia  Hypophosphatemia  Bilateral lower extremity edema    Plan  -On room air.  -We will give Lasix 40 mg IV x1. Trend renal panel and electrolytes.  Replace magnesium and potassium phosphate IV.  -Continue Unasyn, for aspiration pneumonia.  Complete 7 days  -Continue bronchopulmonary hygiene.  Encourage I-S and flutter valve.  -Continue aspiration precautions.  Keep head of bed elevated.  -Continue multivitamin, thiamine, and B12.  -Encourage mobilization.  Out of bed to chair.  -PT/OT on board.     DVT prophylaxis:  Medical DVT prophylaxis orders are present.    Code Status and Medical Interventions:   Ordered at: 07/09/23 0748     Code Status (Patient has no pulse and is not breathing):    CPR (Attempt to Resuscitate)     Medical Interventions (Patient has pulse or is breathing):    Full Support       Labs, microbiology, radiology, medications, and provider notes personally reviewed.  Discussed with primary services and bedside RN.     I, Dr. Dieter Cummings, have spent more than 50% of the total time managing the patient in this encounter today.  This included personally reviewing all pertinent labs, imaging, microbiology and documentation. Also discussing the case with the patient and any available family, the admitting physician and any available ancillary staff.    51 minutes total time spent    Electronically signed by TOREY Sy, 07/12/23, 11:34 AM EDT.  Electronically signed by Dieter Cummings MD,  07/12/23, 12:50 PM EDT.

## 2023-07-12 NOTE — THERAPY TREATMENT NOTE
Acute Care - Physical Therapy Treatment Note  [facility]     Patient Name: Alec Medrano  : 1970  MRN: [id number]  Today's Date: 2023      Visit Dx: Admit date: 2023     Referring Physician: Chris Rivera MD     Surgery Date:* No surgery found *          ICD-10-CM ICD-9-CM   1. Hypothermia due to cold environment  T68.XXXA 991.6   2. Hypernatremia  E87.0 276.0   3. Encephalopathy acute  G93.40 348.30   4. Bradycardia  R00.1 427.89   5. Hypotension, unspecified hypotension type  I95.9 458.9   6. Decreased activities of daily living (ADL)  Z78.9 V49.89   7. Difficulty walking  R26.2 719.7     Patient Active Problem List   Diagnosis    Hypothermia     Past Medical History:   Diagnosis Date    COPD (chronic obstructive pulmonary disease)     Diabetes mellitus     Hyperlipidemia     Hypertension     Hypotension     Pneumothorax     Pulmonary emboli     Unresponsive episode      Past Surgical History:   Procedure Laterality Date    SPLENECTOMY       PT Assessment (last 12 hours)       PT Evaluation and Treatment       Row Name 23 1142          Physical Therapy Time and Intention    Subjective Information no complaints (P)   -KD     Document Type therapy note (daily note) (P)   -KD     Mode of Treatment individual therapy;physical therapy (P)   -KD     Patient Effort good (P)   -KD       Row Name 23 1142          Cognition    Affect/Mental Status (Cognition) confabulatory;confused (P)   -KD     Behavioral Issues (Cognition) inappropriate language (P)   -KD     Orientation Status (Cognition) oriented x 4 (P)   -KD       Row Name 23 1142          Bed Mobility    Bed Mobility scooting/bridging (P)   patient was sitting up right in bed unsupported upon PT entry  -KD     Scooting/Bridging [place] (Bed Mobility) standby assist (P)   -KD     Assistive Device (Bed Mobility) head of bed elevated (P)   -KD       Row Name 23 1142          Transfers    Transfers sit-stand transfer;stand-sit  transfer (P)   -KD       Row Name 07/12/23 1142          Sit-Stand Transfer    Sit-Stand [place] (Transfers) contact guard (P)   -KD     Assistive Device (Sit-Stand Transfers) walker, front-wheeled (P)   -KD       Row Name 07/12/23 1142          Stand-Sit Transfer    Stand-Sit [place] (Transfers) contact guard (P)   -KD     Assistive Device (Stand-Sit Transfers) walker, front-wheeled (P)   -KD       Row Name 07/12/23 1142          Gait/Stairs (Locomotion)    Gait/Stairs Locomotion gait/ambulation assistive device (P)   -KD     [place] Level (Gait) contact guard (P)   -KD     Assistive Device (Gait) walker, front-wheeled (P)   -KD     Distance in Feet (Gait) 5 (P)   to recliner  -KD     Pattern (Gait) step-to (P)   -KD     Deviations/Abnormal Patterns (Gait) gait speed decreased;stride length decreased (P)   -KD       Row Name 07/12/23 1142          Balance    Balance Assessment sitting dynamic balance (P)   -KD     Dynamic Sitting Balance independent (P)   -KD     Position, Sitting Balance unsupported;sitting edge of bed (P)   -KD       Row Name             Wound 07/09/23 1015 Left anterior knee Abrasion    Wound - Properties Group Placement Date: 07/09/23  -KP Placement Time: 1015  -KP Present on Hospital Admission: Y  -KP Side: Left  -KP Orientation: anterior  -KP Location: knee  -KP Primary Wound Type: Abrasion  -KP    Retired Wound - Properties Group Placement Date: 07/09/23  -KP Placement Time: 1015  -KP Present on Hospital Admission: Y  -KP Side: Left  -KP Orientation: anterior  -KP Location: knee  -KP Primary Wound Type: Abrasion  -KP    Retired Wound - Properties Group Date first assessed: 07/09/23  -KP Time first assessed: 1015  -KP Present on Hospital Admission: Y  -KP Side: Left  -KP Location: knee  -KP Primary Wound Type: Abrasion  -KP      Row Name             Wound 07/09/23 1015 Right anterior greater trochanter Abrasion    Wound - Properties Group Placement Date: 07/09/23  -KP Placement  Time: 1015  -KP Side: Right  -KP Orientation: anterior  -KP Location: greater trochanter  -KP Primary Wound Type: Abrasion  -KP    Retired Wound - Properties Group Placement Date: 07/09/23  -KP Placement Time: 1015  -KP Side: Right  -KP Orientation: anterior  -KP Location: greater trochanter  -KP Primary Wound Type: Abrasion  -KP    Retired Wound - Properties Group Date first assessed: 07/09/23  -KP Time first assessed: 1015  -KP Side: Right  -KP Location: greater trochanter  -KP Primary Wound Type: Abrasion  -KP      Row Name             Wound 07/11/23 1455 Left anterior second toe Neuropathic    Wound - Properties Group Placement Date: 07/11/23  -FL Placement Time: 1455  -FL Side: Left  -FL Orientation: anterior  -FL Location: second toe  -FL Primary Wound Type: Neuropathic  -FL    Retired Wound - Properties Group Placement Date: 07/11/23  -FL Placement Time: 1455  -FL Side: Left  -FL Orientation: anterior  -FL Location: second toe  -FL Primary Wound Type: Neuropathic  -FL    Retired Wound - Properties Group Date first assessed: 07/11/23  -FL Time first assessed: 1455  -FL Side: Left  -FL Location: second toe  -FL Primary Wound Type: Neuropathic  -FL      Row Name 07/12/23 1142          Positioning and Restraints    Pre-Treatment Position in bed (P)   -KD     Post Treatment Position chair (P)   -KD     In Chair call light within reach;encouraged to call for assist;reclined;notified nsg (P)   -KD       Row Name 07/12/23 1142          Progress Summary (PT)    Daily Progress Summary (PT) Patient tolerated performing bed mobility and ambulating to the recliner. He would continue to benefit from inpatient PT services (P)   -KD               User Key  (r) = Recorded By, (t) = Taken By, (c) = Cosigned By      Initials Name Provider Type    Angelika Hill, RN Registered Nurse    Kandice Chapin, RN Registered Nurse    Zuly Lott, PT Student PT Student                    Physical Therapy Education        Title: PT OT SLP Therapies (Done)       Topic: Physical Therapy (Done)       Point: Mobility training (Done)       Learning Progress Summary             Patient Acceptance, E,TB, VU by DP at 7/11/2023 1033                         Point: Home exercise program (Done)       Learning Progress Summary             Patient Acceptance, E,TB, VU by DP at 7/11/2023 1033                         Point: Body mechanics (Done)       Learning Progress Summary             Patient Acceptance, E,TB, VU by DP at 7/11/2023 1033                         Point: Precautions (Done)       Learning Progress Summary             Patient Acceptance, E,TB, VU by DP at 7/11/2023 1033                                         User Key       Initials Effective Dates Name Provider Type Discipline    DP 06/03/21 -  Rukhsana Vega, PT Physical Therapist PT                  PT Recommendation and Plan     Progress Summary (PT)  Daily Progress Summary (PT): (P) Patient tolerated performing bed mobility and ambulating to the recliner. He would continue to benefit from inpatient PT services   Outcome Measures       Row Name 07/12/23 1100 07/11/23 1000          How much help from another person do you currently need...    Turning from your back to your side while in flat bed without using bedrails? 4 (P)   -KD 4  -DP     Moving from lying on back to sitting on the side of a flat bed without bedrails? 3 (P)   -KD 3  -DP     Moving to and from a bed to a chair (including a wheelchair)? 3 (P)   -KD 2  -DP     Standing up from a chair using your arms (e.g., wheelchair, bedside chair)? 3 (P)   -KD 2  -DP     Climbing 3-5 steps with a railing? 2 (P)   -KD 2  -DP     To walk in hospital room? 3 (P)   -KD 2  -DP     AM-PAC 6 Clicks Score (PT) 18 (P)   -KD 15  -DP        Functional Assessment    Outcome Measure Options AM-PAC 6 Clicks Basic Mobility (PT) (P)   -KD AM-PAC 6 Clicks Basic Mobility (PT)  -DP               User Key  (r) = Recorded By, (t) = Taken By, (c) =  Cosigned By      Initials Name Provider Type    DP Rukhsana Vega, PT Physical Therapist    Zuly Lott, PT Student PT Student                     Time Calculation:    PT Charges       Row Name 07/12/23 1154             Time Calculation    PT Received On 07/12/23 (P)   -KD         Timed Charges    76773 - PT Therapeutic Exercise Minutes 6 (P)   -KD      33361 - Gait Training Minutes  2 (P)   -KD         Total Minutes    Timed Charges Total Minutes 8 (P)   -KD       Total Minutes 8 (P)   -KD                User Key  (r) = Recorded By, (t) = Taken By, (c) = Cosigned By      Initials Name Provider Type    Zuly Lott, PT Student PT Student                      PT G-Codes  Outcome Measure Options: (P) AM-PAC 6 Clicks Basic Mobility (PT)  AM-PAC 6 Clicks Score (PT): (P) 18  AM-PAC 6 Clicks Score (OT): 15    Zuly Walton PT Student  7/12/2023

## 2023-07-13 LAB
ACETONE SERPL-MCNC: <.01 G/DL (ref 0–0.01)
ALBUMIN SERPL-MCNC: 2.6 G/DL (ref 3.5–5.2)
ALBUMIN/GLOB SERPL: 0.8 G/DL
ALP SERPL-CCNC: 106 U/L (ref 39–117)
ALT SERPL W P-5'-P-CCNC: 41 U/L (ref 1–41)
ANION GAP SERPL CALCULATED.3IONS-SCNC: 7.4 MMOL/L (ref 5–15)
AST SERPL-CCNC: 25 U/L (ref 1–40)
BASOPHILS # BLD AUTO: 0.05 10*3/MM3 (ref 0–0.2)
BASOPHILS NFR BLD AUTO: 0.5 % (ref 0–1.5)
BILIRUB SERPL-MCNC: 0.2 MG/DL (ref 0–1.2)
BUN SERPL-MCNC: 15 MG/DL (ref 6–20)
BUN/CREAT SERPL: 13.6 (ref 7–25)
BUTALBITAL SERPL-MCNC: <1 UG/ML (ref 1–10)
CALCIUM SPEC-SCNC: 9.1 MG/DL (ref 8.6–10.5)
CHLORDIAZEP SERPL-MCNC: <0.1 UG/ML (ref 0.1–0.9)
CHLORIDE SERPL-SCNC: 114 MMOL/L (ref 98–107)
CO2 SERPL-SCNC: 24.6 MMOL/L (ref 22–29)
CREAT SERPL-MCNC: 1.1 MG/DL (ref 0.76–1.27)
DEPRECATED RDW RBC AUTO: 54.5 FL (ref 37–54)
DIAZEPAM SERPL-MCNC: <0.1 UG/ML (ref 0.1–0.9)
EGFRCR SERPLBLD CKD-EPI 2021: 80.8 ML/MIN/1.73
EOSINOPHIL # BLD AUTO: 0.35 10*3/MM3 (ref 0–0.4)
EOSINOPHIL NFR BLD AUTO: 3.5 % (ref 0.3–6.2)
ERYTHROCYTE [DISTWIDTH] IN BLOOD BY AUTOMATED COUNT: 16.2 % (ref 12.3–15.4)
ETHANOL BLD GC-MCNC: <.01 G/DL (ref 0–0.01)
GLOBULIN UR ELPH-MCNC: 3.1 GM/DL
GLUCOSE BLDC GLUCOMTR-MCNC: 118 MG/DL (ref 70–99)
GLUCOSE BLDC GLUCOMTR-MCNC: 131 MG/DL (ref 70–99)
GLUCOSE BLDC GLUCOMTR-MCNC: 135 MG/DL (ref 70–99)
GLUCOSE BLDC GLUCOMTR-MCNC: 174 MG/DL (ref 70–99)
GLUCOSE BLDC GLUCOMTR-MCNC: 204 MG/DL (ref 70–99)
GLUCOSE SERPL-MCNC: 122 MG/DL (ref 65–99)
HCT VFR BLD AUTO: 36.5 % (ref 37.5–51)
HGB BLD-MCNC: 11.9 G/DL (ref 13–17.7)
IMM GRANULOCYTES # BLD AUTO: 0.06 10*3/MM3 (ref 0–0.05)
IMM GRANULOCYTES NFR BLD AUTO: 0.6 % (ref 0–0.5)
ISOPROPANOL SERPL-MCNC: <.01 G/DL (ref 0–0.01)
LYMPHOCYTES # BLD AUTO: 2.77 10*3/MM3 (ref 0.7–3.1)
LYMPHOCYTES NFR BLD AUTO: 27.9 % (ref 19.6–45.3)
Lab: ABNORMAL
MAGNESIUM SERPL-MCNC: 2 MG/DL (ref 1.6–2.6)
MCH RBC QN AUTO: 30.1 PG (ref 26.6–33)
MCHC RBC AUTO-ENTMCNC: 32.6 G/DL (ref 31.5–35.7)
MCV RBC AUTO: 92.2 FL (ref 79–97)
METHANOL SERPL-MCNC: <.01 G/DL (ref 0–0.01)
MONOCYTES # BLD AUTO: 1.01 10*3/MM3 (ref 0.1–0.9)
MONOCYTES NFR BLD AUTO: 10.2 % (ref 5–12)
NEUTROPHILS NFR BLD AUTO: 5.69 10*3/MM3 (ref 1.7–7)
NEUTROPHILS NFR BLD AUTO: 57.3 % (ref 42.7–76)
NORCHLORDIAZEP SERPL-MCNC: <0.1 UG/ML (ref 0.1–0.6)
NORDIAZEPAM SERPL-MCNC: <0.1 UG/ML (ref 0.1–1.4)
NRBC BLD AUTO-RTO: 0.2 /100 WBC (ref 0–0.2)
PENTOBARB SERPL-MCNC: <1 UG/ML (ref 1–5)
PHENOBARB SERPL-MCNC: <1 UG/ML (ref 15–40)
PHOSPHATE SERPL-MCNC: 3 MG/DL (ref 2.5–4.5)
PLATELET # BLD AUTO: 210 10*3/MM3 (ref 140–450)
PMV BLD AUTO: 12.7 FL (ref 6–12)
POTASSIUM SERPL-SCNC: 4.2 MMOL/L (ref 3.5–5.2)
PROT SERPL-MCNC: 5.7 G/DL (ref 6–8.5)
QT INTERVAL: 409 MS
RBC # BLD AUTO: 3.96 10*6/MM3 (ref 4.14–5.8)
SALICYLATES SERPL-MCNC: ABNORMAL UG/ML (ref 30–250)
SODIUM SERPL-SCNC: 146 MMOL/L (ref 136–145)
WBC NRBC COR # BLD: 9.93 10*3/MM3 (ref 3.4–10.8)

## 2023-07-13 PROCEDURE — 93010 ELECTROCARDIOGRAM REPORT: CPT | Performed by: INTERNAL MEDICINE

## 2023-07-13 PROCEDURE — 82948 REAGENT STRIP/BLOOD GLUCOSE: CPT

## 2023-07-13 PROCEDURE — 93005 ELECTROCARDIOGRAM TRACING: CPT | Performed by: PHYSICIAN ASSISTANT

## 2023-07-13 PROCEDURE — 99232 SBSQ HOSP IP/OBS MODERATE 35: CPT | Performed by: INTERNAL MEDICINE

## 2023-07-13 PROCEDURE — 85025 COMPLETE CBC W/AUTO DIFF WBC: CPT | Performed by: INTERNAL MEDICINE

## 2023-07-13 PROCEDURE — 83735 ASSAY OF MAGNESIUM: CPT | Performed by: INTERNAL MEDICINE

## 2023-07-13 PROCEDURE — 25010000002 THIAMINE PER 100 MG: Performed by: INTERNAL MEDICINE

## 2023-07-13 PROCEDURE — 25010000002 ENOXAPARIN PER 10 MG: Performed by: INTERNAL MEDICINE

## 2023-07-13 PROCEDURE — 63710000001 INSULIN LISPRO (HUMAN) PER 5 UNITS: Performed by: INTERNAL MEDICINE

## 2023-07-13 PROCEDURE — 80053 COMPREHEN METABOLIC PANEL: CPT | Performed by: INTERNAL MEDICINE

## 2023-07-13 PROCEDURE — 25010000002 FUROSEMIDE PER 20 MG: Performed by: INTERNAL MEDICINE

## 2023-07-13 PROCEDURE — 25010000002 HALOPERIDOL LACTATE PER 5 MG: Performed by: PHYSICIAN ASSISTANT

## 2023-07-13 PROCEDURE — 97164 PT RE-EVAL EST PLAN CARE: CPT

## 2023-07-13 PROCEDURE — 84100 ASSAY OF PHOSPHORUS: CPT | Performed by: INTERNAL MEDICINE

## 2023-07-13 PROCEDURE — 25010000002 AMPICILLIN-SULBACTAM PER 1.5 G: Performed by: INTERNAL MEDICINE

## 2023-07-13 PROCEDURE — 97116 GAIT TRAINING THERAPY: CPT

## 2023-07-13 PROCEDURE — 94799 UNLISTED PULMONARY SVC/PX: CPT

## 2023-07-13 PROCEDURE — 36415 COLL VENOUS BLD VENIPUNCTURE: CPT | Performed by: INTERNAL MEDICINE

## 2023-07-13 RX ORDER — HALOPERIDOL 5 MG/ML
1 INJECTION INTRAMUSCULAR EVERY 4 HOURS PRN
Status: DISCONTINUED | OUTPATIENT
Start: 2023-07-13 | End: 2023-07-26

## 2023-07-13 RX ORDER — FUROSEMIDE 10 MG/ML
40 INJECTION INTRAMUSCULAR; INTRAVENOUS ONCE
Status: COMPLETED | OUTPATIENT
Start: 2023-07-13 | End: 2023-07-13

## 2023-07-13 RX ORDER — NICOTINE POLACRILEX 4 MG
15 LOZENGE BUCCAL
Status: DISCONTINUED | OUTPATIENT
Start: 2023-07-13 | End: 2023-08-07 | Stop reason: HOSPADM

## 2023-07-13 RX ORDER — DEXTROSE MONOHYDRATE 25 G/50ML
25 INJECTION, SOLUTION INTRAVENOUS
Status: DISCONTINUED | OUTPATIENT
Start: 2023-07-13 | End: 2023-08-07 | Stop reason: HOSPADM

## 2023-07-13 RX ORDER — INSULIN LISPRO 100 [IU]/ML
2-7 INJECTION, SOLUTION INTRAVENOUS; SUBCUTANEOUS
Status: DISCONTINUED | OUTPATIENT
Start: 2023-07-13 | End: 2023-07-21

## 2023-07-13 RX ADMIN — ENOXAPARIN SODIUM 40 MG: 100 INJECTION SUBCUTANEOUS at 06:04

## 2023-07-13 RX ADMIN — Medication 10 ML: at 09:22

## 2023-07-13 RX ADMIN — SENNOSIDES AND DOCUSATE SODIUM 2 TABLET: 50; 8.6 TABLET ORAL at 20:07

## 2023-07-13 RX ADMIN — SODIUM CHLORIDE 3 G: 900 INJECTION INTRAVENOUS at 04:05

## 2023-07-13 RX ADMIN — SODIUM CHLORIDE 3 G: 900 INJECTION INTRAVENOUS at 10:14

## 2023-07-13 RX ADMIN — HALOPERIDOL LACTATE 1 MG: 5 INJECTION, SOLUTION INTRAMUSCULAR at 22:43

## 2023-07-13 RX ADMIN — INSULIN LISPRO 3 UNITS: 100 INJECTION, SOLUTION INTRAVENOUS; SUBCUTANEOUS at 12:59

## 2023-07-13 RX ADMIN — SODIUM CHLORIDE 3 G: 900 INJECTION INTRAVENOUS at 17:16

## 2023-07-13 RX ADMIN — RISPERIDONE 3 MG: 3 TABLET ORAL at 20:07

## 2023-07-13 RX ADMIN — Medication 10 ML: at 20:07

## 2023-07-13 RX ADMIN — SENNOSIDES AND DOCUSATE SODIUM 2 TABLET: 50; 8.6 TABLET ORAL at 09:21

## 2023-07-13 RX ADMIN — HALOPERIDOL LACTATE 1 MG: 5 INJECTION, SOLUTION INTRAMUSCULAR at 01:34

## 2023-07-13 RX ADMIN — WHITE PETROLATUM 1 APPLICATION: 1.75 OINTMENT TOPICAL at 09:25

## 2023-07-13 RX ADMIN — THIAMINE HYDROCHLORIDE 500 MG: 100 INJECTION, SOLUTION INTRAMUSCULAR; INTRAVENOUS at 09:22

## 2023-07-13 RX ADMIN — INSULIN LISPRO 2 UNITS: 100 INJECTION, SOLUTION INTRAVENOUS; SUBCUTANEOUS at 20:11

## 2023-07-13 RX ADMIN — THIAMINE HYDROCHLORIDE 500 MG: 100 INJECTION, SOLUTION INTRAMUSCULAR; INTRAVENOUS at 17:54

## 2023-07-13 RX ADMIN — LIDOCAINE 2 PATCH: 700 PATCH TOPICAL at 09:25

## 2023-07-13 RX ADMIN — THIAMINE HYDROCHLORIDE 500 MG: 100 INJECTION, SOLUTION INTRAMUSCULAR; INTRAVENOUS at 01:05

## 2023-07-13 RX ADMIN — MULTIPLE VITAMINS W/ MINERALS TAB 1 TABLET: TAB at 09:21

## 2023-07-13 RX ADMIN — CYANOCOBALAMIN TAB 500 MCG 1000 MCG: 500 TAB at 09:21

## 2023-07-13 RX ADMIN — FUROSEMIDE 40 MG: 10 INJECTION, SOLUTION INTRAMUSCULAR; INTRAVENOUS at 09:21

## 2023-07-13 RX ADMIN — SODIUM CHLORIDE 3 G: 900 INJECTION INTRAVENOUS at 22:07

## 2023-07-13 RX ADMIN — RISPERIDONE 3 MG: 3 TABLET ORAL at 09:25

## 2023-07-13 RX ADMIN — WHITE PETROLATUM 1 APPLICATION: 1.75 OINTMENT TOPICAL at 22:09

## 2023-07-13 NOTE — THERAPY RE-EVALUATION
Acute Care - Physical Therapy Re-Evaluation   [place]     Patient Name: Alec Medrano  : 1970  MRN: [id number]  Today's Date: 2023      Visit Dx:     ICD-10-CM ICD-9-CM   1. Hypothermia due to cold environment  T68.XXXA 991.6   2. Hypernatremia  E87.0 276.0   3. Encephalopathy acute  G93.40 348.30   4. Bradycardia  R00.1 427.89   5. Hypotension, unspecified hypotension type  I95.9 458.9   6. Decreased activities of daily living (ADL)  Z78.9 V49.89   7. Difficulty walking  R26.2 719.7     Patient Active Problem List   Diagnosis    Hypothermia     Past Medical History:   Diagnosis Date    COPD (chronic obstructive pulmonary disease)     Diabetes mellitus     Hyperlipidemia     Hypertension     Hypotension     Pneumothorax     Pulmonary emboli     Unresponsive episode      Past Surgical History:   Procedure Laterality Date    SPLENECTOMY       PT Assessment (last 12 hours)       PT Evaluation and Treatment       Row Name 23 1400          Physical Therapy Time and Intention    Subjective Information no complaints (P)   -MB     Document Type re-evaluation (P)   -MB     Mode of Treatment individual therapy;physical therapy (P)   -MB     Patient Effort excellent (P)   -MB     Symptoms Noted During/After Treatment none (P)   -MB       Row Name 23 1400          Bed Mobility    Bed Mobility other (see comments) (P)   Pt sitting up upon Pt student arrival  -MB       Row Name 23 1400          Transfers    Transfers sit-stand transfer;stand-sit transfer (P)   -MB       Row Name 23 1400          Sit-Stand Transfer    Sit-Stand [place] (Transfers) contact guard (P)   -MB     Assistive Device (Sit-Stand Transfers) walker, front-wheeled (P)   -MB       Row Name 23 1400          Stand-Sit Transfer    Stand-Sit [place] (Transfers) contact guard (P)   -MB     Assistive Device (Stand-Sit Transfers) walker, front-wheeled (P)   -MB       Row Name 23 1400          Gait/Stairs  (Locomotion)    Gait/Stairs Locomotion gait/ambulation independence;gait/ambulation assistive device;distance ambulated (P)   -MB     [place] Level (Gait) standby assist (P)   -MB     Assistive Device (Gait) walker, front-wheeled (P)   -MB     Distance in Feet (Gait) 120 (P)   -MB       Row Name 07/13/23 1400          Safety Issues, Functional Mobility    Safety Issues Affecting Function (Mobility) awareness of need for assistance;impulsivity;insight into deficits/self-awareness;judgment (P)   -MB     Impairments Affecting Function (Mobility) balance;strength;endurance/activity tolerance;cognition (P)   -MB     Cognitive Impairments, Mobility Safety/Performance awareness, need for assistance;impulsivity;insight into deficits/self-awareness;judgment (P)   -MB       Row Name 07/13/23 1400          Balance    Balance Assessment standing dynamic balance (P)   -MB     Dynamic Standing Balance standby assist (P)   -MB     Position/Device Used, Standing Balance walker, front-wheeled (P)   -MB       Row Name             Wound 07/09/23 1015 Left anterior knee Abrasion    Wound - Properties Group Placement Date: 07/09/23  -KP Placement Time: 1015  -KP Present on Hospital Admission: Y  -KP Side: Left  -KP Orientation: anterior  -KP Location: knee  -KP Primary Wound Type: Abrasion  -KP    Retired Wound - Properties Group Placement Date: 07/09/23  -KP Placement Time: 1015  -KP Present on Hospital Admission: Y  -KP Side: Left  -KP Orientation: anterior  -KP Location: knee  -KP Primary Wound Type: Abrasion  -KP    Retired Wound - Properties Group Date first assessed: 07/09/23  -KP Time first assessed: 1015  -KP Present on Hospital Admission: Y  -KP Side: Left  -KP Location: knee  -KP Primary Wound Type: Abrasion  -KP      Row Name             Wound 07/09/23 1015 Right anterior greater trochanter Abrasion    Wound - Properties Group Placement Date: 07/09/23  -KP Placement Time: 1015  -KP Side: Right  -KP Orientation: anterior   -KP Location: greater trochanter  -KP Primary Wound Type: Abrasion  -KP    Retired Wound - Properties Group Placement Date: 07/09/23  -KP Placement Time: 1015  -KP Side: Right  -KP Orientation: anterior  -KP Location: greater trochanter  -KP Primary Wound Type: Abrasion  -KP    Retired Wound - Properties Group Date first assessed: 07/09/23  -KP Time first assessed: 1015  -KP Side: Right  -KP Location: greater trochanter  -KP Primary Wound Type: Abrasion  -KP      Row Name             Wound 07/11/23 1455 Left anterior second toe Neuropathic    Wound - Properties Group Placement Date: 07/11/23  -FL Placement Time: 1455  -FL Side: Left  -FL Orientation: anterior  -FL Location: second toe  -FL Primary Wound Type: Neuropathic  -FL    Retired Wound - Properties Group Placement Date: 07/11/23  -FL Placement Time: 1455  -FL Side: Left  -FL Orientation: anterior  -FL Location: second toe  -FL Primary Wound Type: Neuropathic  -FL    Retired Wound - Properties Group Date first assessed: 07/11/23  -FL Time first assessed: 1455  -FL Side: Left  -FL Location: second toe  -FL Primary Wound Type: Neuropathic  -FL      Row Name 07/13/23 1400          Plan of Care Review    Plan of Care Reviewed With patient (P)   -MB     Outcome Evaluation PT tolerated treatment well today. He demosntrates great improvement overall and hasd met most of his goals. Ne wgoals have been assigned, he will continue to benefit from skilled PT intervention. (P)   -MB       Row Name 07/13/23 1400          Positioning and Restraints    Pre-Treatment Position in bed (P)   -MB     Post Treatment Position bed (P)   -MB     In Bed sitting (P)   -MB       Row Name 07/13/23 1400          Bed Mobility Goal 1 (PT)    Activity/Assistive Device (Bed Mobility Goal 1, PT) bed mobility activities, all (P)   -MB     [place] Level/Cues Needed (Bed Mobility Goal 1, PT) independent (P)   -MB     Time Frame (Bed Mobility Goal 1, PT) long term goal (LTG);10 days (P)   -MB      Progress/Outcomes (Bed Mobility Goal 1, PT) good progress toward goal (P)   -MB       Row Name 07/13/23 1430 07/13/23 1400       Transfer Goal 1 (PT)    Activity/Assistive Device (Transfer Goal 1, PT) transfers, all (P)   -MB transfers, all;walker, rolling (P)   -MB    [place] Level/Cues Needed (Transfer Goal 1, PT) independent (P)   -MB contact guard required (P)   -MB    Time Frame (Transfer Goal 1, PT) long term goal (LTG);10 days (P)   -MB long term goal (LTG);10 days (P)   -MB    Progress/Outcome (Transfer Goal 1, PT) new goal (P)   -MB goal met (P)   -MB      Row Name 07/13/23 1430 07/13/23 1400       Gait Training Goal 1 (PT)    Activity/Assistive Device (Gait Training Goal 1, PT) gait (walking locomotion) (P)   -MB gait (walking locomotion);walker, rolling (P)   -MB    [place] Level (Gait Training Goal 1, PT) independent (P)   -MB contact guard required (P)   -MB    Distance (Gait Training Goal 1, PT) 200 (P)   - (P)   -MB    Progress/Outcome (Gait Training Goal 1, PT) new goal (P)   -MB goal met (P)   -MB              User Key  (r) = Recorded By, (t) = Taken By, (c) = Cosigned By      Initials Name Provider Type    Angelika Hill, RN Registered Nurse    Kandice Chapin RN Registered Nurse    Yg Rebollar, PT Student PT Student                    Physical Therapy Education       Title: PT OT SLP Therapies (Done)       Topic: Physical Therapy (Done)       Point: Mobility training (Done)       Learning Progress Summary             Patient Acceptance, E,TB, VU by DP at 7/11/2023 1033                         Point: Home exercise program (Done)       Learning Progress Summary             Patient Acceptance, E,TB, VU by DP at 7/11/2023 1033                         Point: Body mechanics (Done)       Learning Progress Summary             Patient Acceptance, E,TB, VU by DP at 7/11/2023 1033                         Point: Precautions (Done)       Learning Progress Summary              Patient Acceptance, E,TB, VU by DP at 7/11/2023 1033                                         User Key       Initials Effective Dates Name Provider Type Discipline    DP 06/03/21 -  Rukhsana Vega, PT Physical Therapist PT                  PT Recommendation and Plan     Plan of Care Reviewed With: (P) patient  Outcome Evaluation: (P) PT tolerated treatment well today. He demosntrates great improvement overall and hasd met most of his goals. Ne wgoals have been assigned, he will continue to benefit from skilled PT intervention.   Outcome Measures       Row Name 07/13/23 1400 07/12/23 1100 07/11/23 1000       How much help from another person do you currently need...    Turning from your back to your side while in flat bed without using bedrails? 4 (P)   -MB 4  -REED (r) KD (t) REED (c) 4  -DP    Moving from lying on back to sitting on the side of a flat bed without bedrails? 4 (P)   -MB 3  -REED (r) KD (t) REED (c) 3  -DP    Moving to and from a bed to a chair (including a wheelchair)? 3 (P)   -MB 3  -REED (r) KD (t) REED (c) 2  -DP    Standing up from a chair using your arms (e.g., wheelchair, bedside chair)? 3 (P)   -MB 3  -REED (r) KD (t) REED (c) 2  -DP    Climbing 3-5 steps with a railing? 3 (P)   -MB 2  -REED (r) KD (t) REED (c) 2  -DP    To walk in hospital room? 4 (P)   -MB 3  -REED (r) KD (t) REED (c) 2  -DP    AM-PAC 6 Clicks Score (PT) 21 (P)   -MB 18  -REED (r) KD (t) 15  -DP       Functional Assessment    Outcome Measure Options -- AM-PAC 6 Clicks Basic Mobility (PT)  -REED (r) KD (t) REED (c) AM-PAC 6 Clicks Basic Mobility (PT)  -DP              User Key  (r) = Recorded By, (t) = Taken By, (c) = Cosigned By      Initials Name Provider Type    DP Rukhsana Vega, PT Physical Therapist    REED Christophe, Allan J, PT Physical Therapist    Zuly Lott, PT Student PT Student    Yg Rebollar, PT Student PT Student                     Time Calculation:    PT Charges       Row Name 07/13/23 1433 07/13/23 1421          Time Calculation     PT Received On -- 07/13/23 (P)   -MB        Timed Charges    76636 - Gait Training Minutes  -- 10 (P)   -MB        Untimed Charges    PT Eval/Re-eval Minutes 25 (P)   -MB --        Total Minutes    Timed Charges Total Minutes -- 10 (P)   -MB     Untimed Charges Total Minutes 25 (P)   -MB --      Total Minutes 25 (P)   -MB 10 (P)   -MB               User Key  (r) = Recorded By, (t) = Taken By, (c) = Cosigned By      Initials Name Provider Type    Yg Rebollar, PT Student PT Student                  Therapy Charges for Today       Code Description Service Date Service Provider Modifiers Qty    [id number] HC GAIT TRAINING EA 15 MIN 7/13/2023 Yg Edwards, PT Student GP 1            PT G-Codes  Outcome Measure Options: AM-PAC 6 Clicks Basic Mobility (PT)  AM-PAC 6 Clicks Score (PT): (P) 21  AM-PAC 6 Clicks Score (OT): 15    Yg Edwards PT Student  7/13/2023
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
## 2023-07-13 NOTE — PLAN OF CARE
Goal Outcome Evaluation:   Initially rectal temp was 94. F Rectally.  Warming blanket placed. Temp increased to 97.  Pt became agitated, restless and uncooperative twice Haldol given x2. Confused but cooperative most of the time.

## 2023-07-13 NOTE — PLAN OF CARE
Problem: Adult Inpatient Plan of Care  Goal: Plan of Care Review  Outcome: Ongoing, Progressing  Flowsheets (Taken 7/13/2023 1603)  Progress: improving  Plan of Care Reviewed With:   patient   family   sibling  Outcome Evaluation: Patient confused with manic and sporatic emotion noted throughout shift. Patient up x1 to bedside recliner for several hours throughout shift. Hypothermia managed per blanketrol. No complaints of pain or discomfort throughout shift. Wound care completed per policy. All other vitals stable. Continuing with plan of care.   Goal Outcome Evaluation:  Plan of Care Reviewed With: patient, family, sibling        Progress: improving  Outcome Evaluation: Patient confused with manic and sporatic emotion noted throughout shift. Patient up x1 to bedside recliner for several hours throughout shift. Hypothermia managed per blanketrol. No complaints of pain or discomfort throughout shift. Wound care completed per policy. All other vitals stable. Continuing with plan of care.

## 2023-07-13 NOTE — CONSULTS
"Nutrition Services    Patient Name: Alec Medrano  YOB: 1970  MRN: [id number]  Admission date: 7/9/2023      CLINICAL NUTRITION ASSESSMENT      Reason for Assessment  MST score 2+     H&P:    Past Medical History:   Diagnosis Date    COPD (chronic obstructive pulmonary disease)     Diabetes mellitus     Hyperlipidemia     Hypertension     Hypotension     Pneumothorax     Pulmonary emboli     Unresponsive episode         Current Problems:   Active Hospital Problems    Diagnosis     **Hypothermia         Nutrition/Diet History         Narrative     Patient admitted with hypothermia. Patient with AMS per chart review. Followed by RD for MST score of 2 for unsure weight loss, no report of decreased appetite/intake. Limited weight history available. BMI 39. No physical signs of malnutrition noted at this time.     Nutrition follow up. Pt's diet advanced to heart healthy diet with soft to chew meats and thin liquids. Per nursing documentation, pt with % meal intake. No acute nutrition concerns or interventions indicated at this time. RD will CTM per protocol.      Anthropometrics        Current Height, Weight Height: 154.9 cm (61\")  Weight: 93.8 kg (206 lb 12.7 oz)   Current BMI Body mass index is 39.07 kg/mý.       Weight Hx  Wt Readings from Last 30 Encounters:   07/09/23 0748 93.8 kg (206 lb 12.7 oz)   07/09/23 0559 93.8 kg (206 lb 12.7 oz)   07/09/23 0550 93.8 kg (206 lb 12.7 oz)            Wt Change Observation [place]      Estimated/Assessed Needs       Energy Requirements 30 kcal/kg adj. BW (62.7 kg)   EST Needs (kcal/day) 1880       Protein Requirements 1.0-1.3 g/kg adj. BW   EST Daily Needs (g/day) 65-80       Fluid Requirements 1 ml/kcal     Estimated Needs (mL/day) 1880     Labs/Medications         Pertinent Labs Reviewed.   Results from last 7 days   Lab Units 07/13/23  0450 07/12/23  0444 07/11/23  0333   SODIUM mmol/L 146* 143 146*   POTASSIUM mmol/L 4.2 3.4* 3.5   CHLORIDE mmol/L 114* 110* " 118*   CO2 mmol/L 24.6 26.1 19.4*   BUN mg/dL 15 20 19   CREATININE mg/dL 1.10 1.36* 1.21   CALCIUM mg/dL 9.1 9.1 8.4*   BILIRUBIN mg/dL 0.2 0.2 0.2   ALK PHOS U/L 106 112 80   ALT (SGPT) U/L 41 49* 55*   AST (SGOT) U/L 25 31 49*   GLUCOSE mg/dL 122* 156* 137*       Results from last 7 days   Lab Units 07/13/23  0450 07/12/23  0444 07/11/23  0333   MAGNESIUM mg/dL 2.0 1.9 2.0   PHOSPHORUS mg/dL 3.0 1.9* 2.1*   HEMOGLOBIN g/dL 11.9* 12.0* 11.8*   HEMATOCRIT % 36.5* 35.8* 36.2*       No results found for: COVID19  Lab Results   Component Value Date    HGBA1C 7.70 (H) 07/09/2023         Pertinent Medications Reviewed.     Current Nutrition Orders & Evaluation of Intake       Oral Nutrition     Current PO Diet Diet: Cardiac Diets; Healthy Heart (2-3 Na+); Texture: Soft to Chew (NDD 3); Soft to Chew: Chopped Meat; Fluid Consistency: Thin (IDDSI 0)   Supplement No active supplement orders       Malnutrition Severity Assessment                Nutrition Diagnosis         Nutrition Dx Problem 1 No nutrition diagnosis at this time.     Nutrition Intervention         No nutrition intervention at this time.     Medical Nutrition Therapy/Nutrition Education          Learner     Readiness N/A  Education not indicated at this time and Education not appropriate at this time     Method     Response N/A  N/A     Monitor/Evaluation        Monitor Per protocol, PO intake, Pertinent labs, Weight, POC/GOC     Nutrition Discharge Plan         To be determined     Electronically signed by:  Maggy Quick, RADHAMES  07/13/23 07:56 EDT

## 2023-07-13 NOTE — SIGNIFICANT NOTE
07/13/23 1053   Personal Safety   Personal Safety Comments Report #[id number] does not meet acceptance criteria for further assessment and will not be assigned to a /staff

## 2023-07-13 NOTE — PROGRESS NOTES
Follow up for septic shock, lactic acidosis and acute hypoxic and hypercapnic respiratory failure.    On room air  Diuresing well  -3.4 L fluid balance  BP stable  Intermittently confused/psychotic  No chest pain  No fever or chills  Weak and fatigued    Review of Systems  General: Fatigue, otherwise denied complaints  HEENT: Denied complaints  Respiratory: Denied complaints  Cardiovascular: Denied complaints  GI: Denied complaints  : Denied complaints  MSK: Weakness, otherwise denied complaints    Vitals:    07/12/23 2040 07/12/23 2310 07/13/23 0310 07/13/23 0647   BP: 155/84 148/87 159/94    BP Location: Left arm Left arm Left arm    Patient Position: Lying Lying Lying    Pulse: 75 81 73 68   Resp: 18 18 18 18   Temp: 95.2 øF (35.1 øC) 97.2 øF (36.2 øC) 97.2 øF (36.2 øC)    TempSrc: Rectal Oral Oral    SpO2: 97% 98% 98% 98%   Weight:       Height:           Physical Exam:  Vital Signs Reviewed   General: WDWN male, alert, intermittently confused, NAD on room air  HEENT:  PERRL, EOMI.    Neck:  Supple, no JVD, no thyromegaly  Chest:  good aeration, clear to auscultation bilaterally, tympanic to percussion bilaterally, no work of breathing noted  CV: RRR, no MGR, pulses 2+, equal  Abd:  Soft, NT, ND, + BS, no HSM  EXT:  no clubbing, no cyanosis, 2+ BLE pitting edema  Neuro:  A&Ox2, CN grossly intact, no focal deficits  Skin: No rashes or lesions noted    Results:        Lab 07/13/23  0450 07/12/23  0444 07/11/23  0333 07/10/23  1842 07/10/23  1143 07/10/23  0237 07/09/23  0611   WBC 9.93 13.02* 13.02*  --   --  14.34* 6.18   HEMOGLOBIN 11.9* 12.0* 11.8*  --   --  12.1* 12.0*   HEMATOCRIT 36.5* 35.8* 36.2*  --   --  36.6* 35.8*   PLATELETS 210 209 198  --   --  234 215   SODIUM 146* 143 146* 150* 153* 155* 153*   POTASSIUM 4.2 3.4* 3.5 4.2 3.4* 3.8 3.2*   CHLORIDE 114* 110* 118* 122* 123* 124* 119*   CO2 24.6 26.1 19.4* 15.7* 22.9 21.3* 25.0   BUN 15 20 19 22* 22* 23* 28*   CREATININE 1.10 1.36* 1.21 1.29* 1.17  1.12 0.78   GLUCOSE 122* 156* 137* 89 105* 65 185*   CALCIUM 9.1 9.1 8.4* 9.0 8.8 8.8 9.1   PHOSPHORUS 3.0 1.9* 2.1*  --   --  3.4 2.6   TOTAL PROTEIN 5.7* 5.9* 5.1*  --   --  5.1* 5.2*   ALBUMIN 2.6* 2.8* 2.3*  --   --  2.6* 2.9*   GLOBULIN 3.1 3.1 2.8  --   --  2.5 2.3       Impression:  Septic shock, present on admission  Altered mental status  Acute hypoxic hypercarbic respiratory failure  Hypothermia  Pneumonia likely aspiration pneumonia   Lactic acidosis  Hypokalemia  Hypomagnesia  Hypernatremia  Hypophosphatemia  Bilateral lower extremity edema    Plan  -On room air.  -Redosed Lasix 40 mg IV x1. Trend renal panel and electrolytes.    -Continue Unasyn, for aspiration pneumonia.  Can discharge on Augmentin to complete therapy.  Complete 7 days  -Continue bronchopulmonary hygiene.  Encourage I-S and flutter valve.  -Continue aspiration precautions.  Keep head of bed elevated.  -Continue multivitamin, thiamine, and B12.  -Encourage mobilization.  Out of bed to chair.  -PT/OT on board.     DVT prophylaxis:  Medical DVT prophylaxis orders are present.    Code Status and Medical Interventions:   Ordered at: 07/09/23 0748     Code Status (Patient has no pulse and is not breathing):    CPR (Attempt to Resuscitate)     Medical Interventions (Patient has pulse or is breathing):    Full Support       Labs, microbiology, radiology, medications, and provider notes personally reviewed.  Discussed with primary services and bedside RN.     Electronically signed by Dieter Cummings MD, 07/13/23, 3:57 PM EDT.

## 2023-07-13 NOTE — SIGNIFICANT NOTE
07/13/23 1000   Coping/Psychosocial   Observed Emotional State calm;cooperative;happy   Verbalized Emotional State hopefulness;relief   Trust Relationship/Rapport empathic listening provided   Involvement in Care interacting with patient   Additional Documentation Spiritual Care (Group)   Spiritual Care   Use of Spiritual Resources spirituality for coping, indicated strong use of   Spiritual Care Source  initiative   Spiritual Care Follow-Up follow-up, none required as presently assessed   Response to Spiritual Care receptive of support;engaged in conversation;thanks expressed   Spiritual Care Interventions supportive conversation provided   Spiritual Care Visit Type initial   Receptivity to Spiritual Care visit welcomed

## 2023-07-14 LAB
BACTERIA SPEC AEROBE CULT: NORMAL
BACTERIA SPEC AEROBE CULT: NORMAL
GLUCOSE BLDC GLUCOMTR-MCNC: 123 MG/DL (ref 70–99)
GLUCOSE BLDC GLUCOMTR-MCNC: 124 MG/DL (ref 70–99)
GLUCOSE BLDC GLUCOMTR-MCNC: 133 MG/DL (ref 70–99)
GLUCOSE BLDC GLUCOMTR-MCNC: 134 MG/DL (ref 70–99)
GLUCOSE BLDC GLUCOMTR-MCNC: 146 MG/DL (ref 70–99)

## 2023-07-14 PROCEDURE — 25010000002 AMPICILLIN-SULBACTAM PER 1.5 G: Performed by: INTERNAL MEDICINE

## 2023-07-14 PROCEDURE — 25010000002 HALOPERIDOL LACTATE PER 5 MG: Performed by: PHYSICIAN ASSISTANT

## 2023-07-14 PROCEDURE — 94799 UNLISTED PULMONARY SVC/PX: CPT

## 2023-07-14 PROCEDURE — 97530 THERAPEUTIC ACTIVITIES: CPT

## 2023-07-14 PROCEDURE — 25010000002 ENOXAPARIN PER 10 MG: Performed by: INTERNAL MEDICINE

## 2023-07-14 PROCEDURE — 25010000002 THIAMINE PER 100 MG: Performed by: INTERNAL MEDICINE

## 2023-07-14 PROCEDURE — 97116 GAIT TRAINING THERAPY: CPT

## 2023-07-14 PROCEDURE — 99232 SBSQ HOSP IP/OBS MODERATE 35: CPT | Performed by: INTERNAL MEDICINE

## 2023-07-14 PROCEDURE — 82948 REAGENT STRIP/BLOOD GLUCOSE: CPT

## 2023-07-14 RX ORDER — DIVALPROEX SODIUM 250 MG/1
250 TABLET, DELAYED RELEASE ORAL 2 TIMES DAILY
Status: DISCONTINUED | OUTPATIENT
Start: 2023-07-14 | End: 2023-07-19

## 2023-07-14 RX ORDER — TRAZODONE HYDROCHLORIDE 100 MG/1
100 TABLET ORAL NIGHTLY
Status: DISCONTINUED | OUTPATIENT
Start: 2023-07-14 | End: 2023-08-07 | Stop reason: HOSPADM

## 2023-07-14 RX ADMIN — ENOXAPARIN SODIUM 40 MG: 100 INJECTION SUBCUTANEOUS at 06:36

## 2023-07-14 RX ADMIN — THIAMINE HYDROCHLORIDE 500 MG: 100 INJECTION, SOLUTION INTRAMUSCULAR; INTRAVENOUS at 02:57

## 2023-07-14 RX ADMIN — HALOPERIDOL LACTATE 1 MG: 5 INJECTION, SOLUTION INTRAMUSCULAR at 18:09

## 2023-07-14 RX ADMIN — DIVALPROEX SODIUM 250 MG: 250 TABLET, DELAYED RELEASE ORAL at 21:59

## 2023-07-14 RX ADMIN — LIDOCAINE 2 PATCH: 700 PATCH TOPICAL at 09:15

## 2023-07-14 RX ADMIN — CYANOCOBALAMIN TAB 500 MCG 1000 MCG: 500 TAB at 09:15

## 2023-07-14 RX ADMIN — SENNOSIDES AND DOCUSATE SODIUM 2 TABLET: 50; 8.6 TABLET ORAL at 09:15

## 2023-07-14 RX ADMIN — THIAMINE HYDROCHLORIDE 500 MG: 100 INJECTION, SOLUTION INTRAMUSCULAR; INTRAVENOUS at 09:15

## 2023-07-14 RX ADMIN — THIAMINE HYDROCHLORIDE 500 MG: 100 INJECTION, SOLUTION INTRAMUSCULAR; INTRAVENOUS at 18:09

## 2023-07-14 RX ADMIN — WHITE PETROLATUM 1 APPLICATION: 1.75 OINTMENT TOPICAL at 23:24

## 2023-07-14 RX ADMIN — TRAZODONE HYDROCHLORIDE 100 MG: 100 TABLET ORAL at 21:59

## 2023-07-14 RX ADMIN — Medication 10 ML: at 09:15

## 2023-07-14 RX ADMIN — Medication 10 ML: at 21:59

## 2023-07-14 RX ADMIN — RISPERIDONE 3 MG: 3 TABLET ORAL at 09:15

## 2023-07-14 RX ADMIN — RISPERIDONE 3 MG: 3 TABLET ORAL at 21:59

## 2023-07-14 RX ADMIN — MULTIPLE VITAMINS W/ MINERALS TAB 1 TABLET: TAB at 09:15

## 2023-07-14 RX ADMIN — WHITE PETROLATUM 1 APPLICATION: 1.75 OINTMENT TOPICAL at 09:16

## 2023-07-14 RX ADMIN — SODIUM CHLORIDE 3 G: 900 INJECTION INTRAVENOUS at 05:20

## 2023-07-14 RX ADMIN — SENNOSIDES AND DOCUSATE SODIUM 2 TABLET: 50; 8.6 TABLET ORAL at 21:59

## 2023-07-14 RX ADMIN — DIVALPROEX SODIUM 250 MG: 250 TABLET, DELAYED RELEASE ORAL at 14:59

## 2023-07-14 NOTE — THERAPY TREATMENT NOTE
Acute Care - Physical Therapy Progress Note  [facility]     Patient Name: Alec Medrano  : 1970  MRN: [id number]  Today's Date: 2023      Visit Dx:     ICD-10-CM ICD-9-CM   1. Hypothermia due to cold environment  T68.XXXA 991.6   2. Hypernatremia  E87.0 276.0   3. Encephalopathy acute  G93.40 348.30   4. Bradycardia  R00.1 427.89   5. Hypotension, unspecified hypotension type  I95.9 458.9   6. Decreased activities of daily living (ADL)  Z78.9 V49.89   7. Difficulty walking  R26.2 719.7     Patient Active Problem List   Diagnosis    Hypothermia     Past Medical History:   Diagnosis Date    COPD (chronic obstructive pulmonary disease)     Diabetes mellitus     Hyperlipidemia     Hypertension     Hypotension     Pneumothorax     Pulmonary emboli     Unresponsive episode      Past Surgical History:   Procedure Laterality Date    SPLENECTOMY       PT Assessment (last 12 hours)       PT Evaluation and Treatment       Row Name 23 1600          Physical Therapy Time and Intention    Subjective Information no complaints  -CS     Document Type therapy note (daily note)  -CS     Mode of Treatment individual therapy;physical therapy  -CS     Patient Effort good  -CS     Symptoms Noted During/After Treatment none  -CS       Row Name 23 1600          Bed Mobility    Scooting/Bridging Sultana (Bed Mobility) standby assist  -CS     Supine-Sit Sultana (Bed Mobility) standby assist  -CS     Sit-Supine Sultana (Bed Mobility) standby assist  -CS     Assistive Device (Bed Mobility) bed rails  -CS       Row Name 23 1600          Sit-Stand Transfer    Sit-Stand Sultana (Transfers) contact guard  -CS     Assistive Device (Sit-Stand Transfers) walker, front-wheeled  -CS       Row Name 23 1600          Stand-Sit Transfer    Stand-Sit Sultana (Transfers) contact guard  -CS     Assistive Device (Stand-Sit Transfers) walker, front-wheeled  -CS       Row Name 23 1600           Gait/Stairs (Locomotion)    Bledsoe Level (Gait) verbal cues;contact guard;1 person assist  -CS     Assistive Device (Gait) walker, front-wheeled  -CS     Distance in Feet (Gait) 750  -CS     Pattern (Gait) 4-point;step-through  -CS     Deviations/Abnormal Patterns (Gait) gait speed decreased;stride length decreased  -CS       Row Name             Wound 07/09/23 1015 Left anterior knee Abrasion    Wound - Properties Group Placement Date: 07/09/23  -KP Placement Time: 1015  -KP Present on Hospital Admission: Y  -KP Side: Left  -KP Orientation: anterior  -KP Location: knee  -KP Primary Wound Type: Abrasion  -KP    Retired Wound - Properties Group Placement Date: 07/09/23  -KP Placement Time: 1015  -KP Present on Hospital Admission: Y  -KP Side: Left  -KP Orientation: anterior  -KP Location: knee  -KP Primary Wound Type: Abrasion  -KP    Retired Wound - Properties Group Date first assessed: 07/09/23  -KP Time first assessed: 1015  -KP Present on Hospital Admission: Y  -KP Side: Left  -KP Location: knee  -KP Primary Wound Type: Abrasion  -KP      Row Name             Wound 07/09/23 1015 Right anterior greater trochanter Abrasion    Wound - Properties Group Placement Date: 07/09/23  -KP Placement Time: 1015  -KP Side: Right  -KP Orientation: anterior  -KP Location: greater trochanter  -KP Primary Wound Type: Abrasion  -KP    Retired Wound - Properties Group Placement Date: 07/09/23  -KP Placement Time: 1015  -KP Side: Right  -KP Orientation: anterior  -KP Location: greater trochanter  -KP Primary Wound Type: Abrasion  -KP    Retired Wound - Properties Group Date first assessed: 07/09/23  -KP Time first assessed: 1015  -KP Side: Right  -KP Location: greater trochanter  -KP Primary Wound Type: Abrasion  -KP      Row Name             Wound 07/11/23 1455 Left anterior second toe Neuropathic    Wound - Properties Group Placement Date: 07/11/23  -FL Placement Time: 1455  -FL Side: Left  -FL Orientation: anterior  -FL  Location: second toe  -FL Primary Wound Type: Neuropathic  -FL    Retired Wound - Properties Group Placement Date: 07/11/23 -FL Placement Time: 1455  -FL Side: Left  -FL Orientation: anterior  -FL Location: second toe  -FL Primary Wound Type: Neuropathic  -FL    Retired Wound - Properties Group Date first assessed: 07/11/23 -FL Time first assessed: 1455  -FL Side: Left  -FL Location: second toe  -FL Primary Wound Type: Neuropathic  -FL      Row Name 07/14/23 1600          Positioning and Restraints    Pre-Treatment Position in bed  -CS     Post Treatment Position bed  -CS     In Bed fowlers;call light within reach;encouraged to call for assist;exit alarm on;notified nsg  -CS       Row Name 07/14/23 1600          Progress Summary (PT)    Progress Toward Functional Goals (PT) progress toward functional goals is good  -CS               User Key  (r) = Recorded By, (t) = Taken By, (c) = Cosigned By      Initials Name Provider Type    Angelika Hill, RN Registered Nurse    Kandice Chapin, RN Registered Nurse    Yousuf Aviles PTA Physical Therapist Assistant                    Physical Therapy Education       Title: PT OT SLP Therapies (Done)       Topic: Physical Therapy (Done)       Point: Mobility training (Done)       Learning Progress Summary             Patient Acceptance, E,TB, VU by DP at 7/11/2023 1033                         Point: Home exercise program (Done)       Learning Progress Summary             Patient Acceptance, E,TB, VU by DP at 7/11/2023 1033                         Point: Body mechanics (Done)       Learning Progress Summary             Patient Acceptance, E,TB, VU by DP at 7/11/2023 1033                         Point: Precautions (Done)       Learning Progress Summary             Patient Acceptance, E,TB, VU by DP at 7/11/2023 1033                                         User Key       Initials Effective Dates Name Provider Type Discipline    DP 06/03/21 -  Rukhsana Vega, PT  Physical Therapist PT                  PT Recommendation and Plan     Progress Summary (PT)  Progress Toward Functional Goals (PT): progress toward functional goals is good   Outcome Measures       Row Name 07/14/23 1600 07/13/23 1400 07/12/23 1100       How much help from another person do you currently need...    Turning from your back to your side while in flat bed without using bedrails? 4  -CS 4  -REED (r) MB (t) REED (c) 4  -REED (r) KD (t) REED (c)    Moving from lying on back to sitting on the side of a flat bed without bedrails? 4  -CS 4  -REED (r) MB (t) REED (c) 3  -REED (r) KD (t) REED (c)    Moving to and from a bed to a chair (including a wheelchair)? 3  -CS 3  -REED (r) MB (t) REED (c) 3  -REED (r) KD (t) REED (c)    Standing up from a chair using your arms (e.g., wheelchair, bedside chair)? 3  -CS 3  -REED (r) MB (t) REED (c) 3  -REED (r) KD (t) REED (c)    Climbing 3-5 steps with a railing? 3  -CS 3  -REED (r) MB (t) REED (c) 2  -REED (r) KD (t) REED (c)    To walk in hospital room? 3  -CS 4  -REED (r) MB (t) REED (c) 3  -REED (r) KD (t) REED (c)    AM-PAC 6 Clicks Score (PT) 20  -CS 21  -REED (r) MB (t) 18  -REED (r) KD (t)       Functional Assessment    Outcome Measure Options AM-PAC 6 Clicks Basic Mobility (PT)  -CS -- AM-PAC 6 Clicks Basic Mobility (PT)  -REED (r) KD (t) REED (c)              User Key  (r) = Recorded By, (t) = Taken By, (c) = Cosigned By      Initials Name Provider Type    REED Allan Saeed, PT Physical Therapist    Yousuf Aviles, PTA Physical Therapist Assistant    Zuly Lott, PT Student PT Student    Yg Rebollar, PT Student PT Student                     Time Calculation:    PT Charges       Row Name 07/14/23 1625             Time Calculation    Start Time 1632  -CS      PT Received On 07/14/23  -CS         Timed Charges    30908 - Gait Training Minutes  17  -CS      22665 - PT Therapeutic Activity Minutes 6  -CS         Total Minutes    Timed Charges Total Minutes 23  -CS       Total Minutes 23  -CS                 User Key  (r) = Recorded By, (t) = Taken By, (c) = Cosigned By      Initials Name Provider Type    CS Yousuf Warner PTA Physical Therapist Assistant                  Therapy Charges for Today       Code Description Service Date Service Provider Modifiers Qty    22703458675 HC GAIT TRAINING EA 15 MIN 7/14/2023 Yousuf Warner PTA GP 1    30043563457 HC PT THERAPEUTIC ACT EA 15 MIN 7/14/2023 Yousuf Warner PTA GP 1            PT G-Codes  Outcome Measure Options: AM-PAC 6 Clicks Basic Mobility (PT)  AM-PAC 6 Clicks Score (PT): 20  AM-PAC 6 Clicks Score (OT): 15    Yousuf Warner PTA  7/14/2023

## 2023-07-14 NOTE — PROGRESS NOTES
Follow up for septic shock, lactic acidosis and acute hypoxic and hypercapnic respiratory failure.    On room air  Diuresing well  Still having some bizarre thinking and intermittent confusion and psychosis  Denies any complaints  No chest pain  No fever or chills  Weak and fatigued    Review of Systems  General: Fatigue, otherwise denied complaints  Respiratory: Denied complaints  Cardiovascular: Denied complaints  GI: Denied complaints  : Denied complaints      Vitals:    07/13/23 1517 07/13/23 1920 07/13/23 2315 07/14/23 0712   BP: 134/84 145/83 147/76    BP Location: Right arm Right arm Right arm    Patient Position: Lying Lying Lying    Pulse: 100 98 83 79   Resp: 18 18 18    Temp: 97.9 øF (36.6 øC) 98.8 øF (37.1 øC) 98.1 øF (36.7 øC)    TempSrc: Oral Oral Oral    SpO2: 97% 94% 95%    Weight:       Height:           Physical Exam:  Vital Signs Reviewed   General: WDWN male, alert, intermittently confused, NAD on room air  HEENT:  PERRL, EOMI.    Chest:  good aeration, clear to auscultation bilaterally, tympanic to percussion bilaterally, no work of breathing noted  CV: RRR, no MGR, pulses 2+, equal  Abd:  Soft, NT, ND, + BS, no HSM  EXT:  no clubbing, no cyanosis, decreasing BLE pitting edema  Neuro:  A&Ox2, CN grossly intact, no focal deficits  Skin: No rashes or lesions noted    Results:      Lab 07/13/23  0450 07/12/23  0444 07/11/23  0333 07/10/23  1842 07/10/23  1143 07/10/23  0237 07/09/23  0611   WBC 9.93 13.02* 13.02*  --   --  14.34* 6.18   HEMOGLOBIN 11.9* 12.0* 11.8*  --   --  12.1* 12.0*   HEMATOCRIT 36.5* 35.8* 36.2*  --   --  36.6* 35.8*   PLATELETS 210 209 198  --   --  234 215   SODIUM 146* 143 146* 150* 153* 155* 153*   POTASSIUM 4.2 3.4* 3.5 4.2 3.4* 3.8 3.2*   CHLORIDE 114* 110* 118* 122* 123* 124* 119*   CO2 24.6 26.1 19.4* 15.7* 22.9 21.3* 25.0   BUN 15 20 19 22* 22* 23* 28*   CREATININE 1.10 1.36* 1.21 1.29* 1.17 1.12 0.78   GLUCOSE 122* 156* 137* 89 105* 65 185*   CALCIUM 9.1 9.1 8.4*  9.0 8.8 8.8 9.1   PHOSPHORUS 3.0 1.9* 2.1*  --   --  3.4 2.6   TOTAL PROTEIN 5.7* 5.9* 5.1*  --   --  5.1* 5.2*   ALBUMIN 2.6* 2.8* 2.3*  --   --  2.6* 2.9*   GLOBULIN 3.1 3.1 2.8  --   --  2.5 2.3       Impression:  Septic shock, present on admission  Altered mental status  Acute hypoxic hypercarbic respiratory failure  Hypothermia  Pneumonia likely aspiration pneumonia   Lactic acidosis  Hypokalemia  Hypomagnesia  Hypernatremia  Hypophosphatemia  Bilateral lower extremity edema    Plan  On room air  Completed antibiotics  Encourage activity and incentive spirometer use  Continue bronchopulmonary hygiene.  Encourage I-S and flutter valve.  Continue aspiration precautions.  Keep head of bed elevated.    DVT prophylaxis:  Medical DVT prophylaxis orders are present.    Code Status and Medical Interventions:   Ordered at: 07/09/23 0748     Code Status (Patient has no pulse and is not breathing):    CPR (Attempt to Resuscitate)     Medical Interventions (Patient has pulse or is breathing):    Full Support       Labs, microbiology, radiology, medications, and provider notes personally reviewed.  Discussed with primary services and bedside RN.     I will sign off.  Please call with questions.    Electronically signed by Dieter Cummings MD, 07/14/23, 1:26 PM EDT.

## 2023-07-14 NOTE — PROGRESS NOTES
[facility]     Psychiatric Progress Note    Patient Name: Alec Medrano  : 1970  MRN: [id number]  Primary Care Physician:  Provider, No Known  Date of admission: 2023    Subjective   Subjective     Patient seen and chart reviewed, discussed with staff.    Chief Complaint: Psychosis      HPI:     Staff reports the patient had some difficulty sleeping last night.  He is given Haldol before midnight last night and was still up till about 1 AM.  Continues to be delusional and described as being expansive or having a euphoric type affect.    Patient's morning sleeping in his chair and is difficult to arouse and unable to dissipate fully in exam.    Patient did have a 's order for evaluation at [facility], but has been released on his own recognizance as and now 504 is no longer valid.  [facility].  Family unable to provide care for the patient.    Patient had some difficulty sleeping.  Continues to be delusional with some bizarre thinking.  Continues to have reported elevated mood.          Objective   Objective     Vitals:   Temp:  [95.7 øF (35.4 øC)-98.8 øF (37.1 øC)] 98.4 øF (36.9 øC)  Heart Rate:  [] 86  Resp:  [18-20] 18  BP: (134-147)/(76-88) 144/87  Flow (L/min):  [0] 0          Mental Status Exam:      Appearance:   Patient is sleeping very heavily and unable to arouse to participate in interview  Reliability:   Has been poor  Eye Contact:   Previously good, currently sleeping  Concentration/Focus:    Noted to be tangential and bizarre  Behaviors:    No agitation but somewhat expansive  Memory :    Limited  Speech:    Reported as very long  Language:   Appropriate  Mood :    Elevated  Affect:    Expansive  Thought process:    Tangential, simple, delusional  Thought Content:    No suicidal or homicidal ideations  Insight:   Limited  Judgement:    Impaired      Result Review    Result Review:  I have personally reviewed the results  from the time of this admission to 7/14/2023 12:28 EDT and agree with these findings:  []  Laboratory  []  Microbiology  []  Radiology  []  EKG/Telemetry   []  Cardiology/Vascular   []  Pathology  []  Old records  []  Other:  Most notable findings include:     Lab Results (last 24 hours)       Procedure Component Value Units Date/Time    POC Glucose Once [[id number]]  (Abnormal) Collected: 07/14/23 0731    Specimen: Blood Updated: 07/14/23 0739     Glucose 146 mg/dL      Comment: Serial Number: [id number]:  [id number]       Blood Culture - Blood, Arm, Right [[id number]]  (Normal) Collected: 07/09/23 0623    Specimen: Blood from Arm, Right Updated: 07/14/23 0630     Blood Culture No growth at 5 days    Blood Culture - Blood, Arm, Left [[id number]]  (Normal) Collected: 07/09/23 0611    Specimen: Blood from Arm, Left Updated: 07/14/23 0630     Blood Culture No growth at 5 days    POC Glucose Once [[id number]]  (Abnormal) Collected: 07/14/23 0530    Specimen: Blood Updated: 07/14/23 0538     Glucose 124 mg/dL      Comment: Serial Number: [id number]:  [id number]       POC Glucose Once [[id number]]  (Abnormal) Collected: 07/13/23 2006    Specimen: Blood Updated: 07/13/23 2010     Glucose 174 mg/dL      Comment: Serial Number: [id number]:  [id number]       POC Glucose Once [[id number]]  (Abnormal) Collected: 07/13/23 1715    Specimen: Blood Updated: 07/13/23 1718     Glucose 131 mg/dL      Comment: Serial Number: [id number]:  [id number]                   Medications:   cyanocobalamin, 1,000 mcg, Intramuscular, Q28 Days  divalproex, 250 mg, Oral, BID  enoxaparin, 40 mg, Subcutaneous, Q24H  insulin lispro, 2-7 Units, Subcutaneous, 4x Daily AC & at Bedtime  lidocaine, 2 patch, Transdermal, Q24H  mineral oil-hydrophilic petrolatum, 1 application , Topical, 2 times per day  multivitamin with minerals, 1 tablet, Oral, Daily  risperiDONE, 3 mg, Oral, BID  senna-docusate sodium, 2 tablet, Oral, BID  sodium  chloride, 10 mL, Intravenous, Q12H  thiamine (B-1) IV, 500 mg, Intravenous, Q8H  traZODone, 100 mg, Oral, Nightly  vitamin B-12, 1,000 mcg, Oral, Daily          Assessment / Plan       Active Hospital Problems:  Active Hospital Problems    Diagnosis     **Hypothermia        Plan:     Will add Depakote for mood stabilization for reported manic type symptoms  Patient not sleeping and we will add trazodone for insomnia  Continue Risperdal titrate as clinically indicated, if ineffective consider switching to another agent      Disposition:  I expect patient to be discharged .    Part of this note may be an electronic transcription/translation of spoken language to printed text using the Dragon dictation system.         Electronically signed by Chris Green MD, 07/14/23, 12:28 PM EDT.

## 2023-07-14 NOTE — PLAN OF CARE
Goal Outcome Evaluation:  Plan of Care Reviewed With: patient        Progress: no change       Patient alert to self. Aware he is at the hospital but unaware time and situation. Very anxious, verbally inappropriate with female staff. Attempting to get out of bed unassisted. PRN haldol given per MD order and effective. Denies pain at this time. Currently resting in bed easy to arouse.
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
## 2023-07-14 NOTE — PLAN OF CARE
Problem: Adult Inpatient Plan of Care  Goal: Plan of Care Review  Outcome: Ongoing, Progressing  Flowsheets  Taken 7/14/2023 1627  Progress: improving  Plan of Care Reviewed With: patient  Taken 7/13/2023 1603  Outcome Evaluation: Patient confused with manic and sporatic emotion noted throughout shift. Patient up x1 to bedside recliner for several hours throughout shift. Hypothermia managed per blanketrol. No complaints of pain or discomfort throughout shift. Wound care completed per policy. All other vitals stable. Continuing with plan of care.   Goal Outcome Evaluation:  Plan of Care Reviewed With: patient        Progress: improving  Outcome Evaluation: Patient confused with manic and sporatic emotion noted throughout shift. Patient up x1 to bedside recliner for several hours throughout shift. Hypothermia managed per blanketrol. No complaints of pain or discomfort throughout shift. Wound care completed per policy. All other vitals stable. Continuing with plan of care.

## 2023-07-15 LAB
ALBUMIN SERPL-MCNC: 2.8 G/DL (ref 3.5–5.2)
ALBUMIN/GLOB SERPL: 0.8 G/DL
ALP SERPL-CCNC: 132 U/L (ref 39–117)
ALT SERPL W P-5'-P-CCNC: 56 U/L (ref 1–41)
ANION GAP SERPL CALCULATED.3IONS-SCNC: 8.9 MMOL/L (ref 5–15)
AST SERPL-CCNC: 41 U/L (ref 1–40)
BASOPHILS # BLD AUTO: 0.03 10*3/MM3 (ref 0–0.2)
BASOPHILS NFR BLD AUTO: 0.2 % (ref 0–1.5)
BILIRUB SERPL-MCNC: 0.2 MG/DL (ref 0–1.2)
BUN SERPL-MCNC: 17 MG/DL (ref 6–20)
BUN/CREAT SERPL: 16.3 (ref 7–25)
CALCIUM SPEC-SCNC: 9.5 MG/DL (ref 8.6–10.5)
CHLORIDE SERPL-SCNC: 106 MMOL/L (ref 98–107)
CO2 SERPL-SCNC: 27.1 MMOL/L (ref 22–29)
CREAT SERPL-MCNC: 1.04 MG/DL (ref 0.76–1.27)
DEPRECATED RDW RBC AUTO: 54.4 FL (ref 37–54)
EGFRCR SERPLBLD CKD-EPI 2021: 86.4 ML/MIN/1.73
EOSINOPHIL # BLD AUTO: 0.36 10*3/MM3 (ref 0–0.4)
EOSINOPHIL NFR BLD AUTO: 2.9 % (ref 0.3–6.2)
ERYTHROCYTE [DISTWIDTH] IN BLOOD BY AUTOMATED COUNT: 16.2 % (ref 12.3–15.4)
GLOBULIN UR ELPH-MCNC: 3.3 GM/DL
GLUCOSE BLDC GLUCOMTR-MCNC: 121 MG/DL (ref 70–99)
GLUCOSE BLDC GLUCOMTR-MCNC: 126 MG/DL (ref 70–99)
GLUCOSE BLDC GLUCOMTR-MCNC: 135 MG/DL (ref 70–99)
GLUCOSE BLDC GLUCOMTR-MCNC: 143 MG/DL (ref 70–99)
GLUCOSE BLDC GLUCOMTR-MCNC: 187 MG/DL (ref 70–99)
GLUCOSE BLDC GLUCOMTR-MCNC: 190 MG/DL (ref 70–99)
GLUCOSE SERPL-MCNC: 267 MG/DL (ref 65–99)
HCT VFR BLD AUTO: 38.3 % (ref 37.5–51)
HGB BLD-MCNC: 12.5 G/DL (ref 13–17.7)
IMM GRANULOCYTES # BLD AUTO: 0.06 10*3/MM3 (ref 0–0.05)
IMM GRANULOCYTES NFR BLD AUTO: 0.5 % (ref 0–0.5)
LYMPHOCYTES # BLD AUTO: 2.22 10*3/MM3 (ref 0.7–3.1)
LYMPHOCYTES NFR BLD AUTO: 17.9 % (ref 19.6–45.3)
MAGNESIUM SERPL-MCNC: 2.1 MG/DL (ref 1.6–2.6)
MCH RBC QN AUTO: 30 PG (ref 26.6–33)
MCHC RBC AUTO-ENTMCNC: 32.6 G/DL (ref 31.5–35.7)
MCV RBC AUTO: 92.1 FL (ref 79–97)
MONOCYTES # BLD AUTO: 1.24 10*3/MM3 (ref 0.1–0.9)
MONOCYTES NFR BLD AUTO: 10 % (ref 5–12)
NEUTROPHILS NFR BLD AUTO: 68.5 % (ref 42.7–76)
NEUTROPHILS NFR BLD AUTO: 8.46 10*3/MM3 (ref 1.7–7)
NRBC BLD AUTO-RTO: 0 /100 WBC (ref 0–0.2)
PHOSPHATE SERPL-MCNC: 3.7 MG/DL (ref 2.5–4.5)
PLATELET # BLD AUTO: 235 10*3/MM3 (ref 140–450)
PMV BLD AUTO: 12.2 FL (ref 6–12)
POTASSIUM SERPL-SCNC: 4.4 MMOL/L (ref 3.5–5.2)
PROT SERPL-MCNC: 6.1 G/DL (ref 6–8.5)
RBC # BLD AUTO: 4.16 10*6/MM3 (ref 4.14–5.8)
SODIUM SERPL-SCNC: 142 MMOL/L (ref 136–145)
WBC NRBC COR # BLD: 12.37 10*3/MM3 (ref 3.4–10.8)

## 2023-07-15 PROCEDURE — 84100 ASSAY OF PHOSPHORUS: CPT | Performed by: INTERNAL MEDICINE

## 2023-07-15 PROCEDURE — 99232 SBSQ HOSP IP/OBS MODERATE 35: CPT | Performed by: INTERNAL MEDICINE

## 2023-07-15 PROCEDURE — 63710000001 INSULIN LISPRO (HUMAN) PER 5 UNITS: Performed by: INTERNAL MEDICINE

## 2023-07-15 PROCEDURE — 82948 REAGENT STRIP/BLOOD GLUCOSE: CPT

## 2023-07-15 PROCEDURE — 97116 GAIT TRAINING THERAPY: CPT

## 2023-07-15 PROCEDURE — 25010000002 THIAMINE PER 100 MG: Performed by: INTERNAL MEDICINE

## 2023-07-15 PROCEDURE — 94799 UNLISTED PULMONARY SVC/PX: CPT

## 2023-07-15 PROCEDURE — 80053 COMPREHEN METABOLIC PANEL: CPT | Performed by: INTERNAL MEDICINE

## 2023-07-15 PROCEDURE — 25010000002 ENOXAPARIN PER 10 MG: Performed by: INTERNAL MEDICINE

## 2023-07-15 PROCEDURE — 36415 COLL VENOUS BLD VENIPUNCTURE: CPT | Performed by: INTERNAL MEDICINE

## 2023-07-15 PROCEDURE — 85025 COMPLETE CBC W/AUTO DIFF WBC: CPT | Performed by: INTERNAL MEDICINE

## 2023-07-15 PROCEDURE — 83735 ASSAY OF MAGNESIUM: CPT | Performed by: INTERNAL MEDICINE

## 2023-07-15 RX ADMIN — SENNOSIDES AND DOCUSATE SODIUM 2 TABLET: 50; 8.6 TABLET ORAL at 20:07

## 2023-07-15 RX ADMIN — ENOXAPARIN SODIUM 40 MG: 100 INJECTION SUBCUTANEOUS at 06:00

## 2023-07-15 RX ADMIN — TRAZODONE HYDROCHLORIDE 100 MG: 100 TABLET ORAL at 20:07

## 2023-07-15 RX ADMIN — CYANOCOBALAMIN TAB 500 MCG 1000 MCG: 500 TAB at 08:43

## 2023-07-15 RX ADMIN — INSULIN LISPRO 2 UNITS: 100 INJECTION, SOLUTION INTRAVENOUS; SUBCUTANEOUS at 20:08

## 2023-07-15 RX ADMIN — SENNOSIDES AND DOCUSATE SODIUM 2 TABLET: 50; 8.6 TABLET ORAL at 08:43

## 2023-07-15 RX ADMIN — DIVALPROEX SODIUM 250 MG: 250 TABLET, DELAYED RELEASE ORAL at 08:43

## 2023-07-15 RX ADMIN — DIVALPROEX SODIUM 250 MG: 250 TABLET, DELAYED RELEASE ORAL at 20:07

## 2023-07-15 RX ADMIN — LIDOCAINE 2 PATCH: 700 PATCH TOPICAL at 08:42

## 2023-07-15 RX ADMIN — THIAMINE HYDROCHLORIDE 500 MG: 100 INJECTION, SOLUTION INTRAMUSCULAR; INTRAVENOUS at 01:42

## 2023-07-15 RX ADMIN — RISPERIDONE 3 MG: 3 TABLET ORAL at 08:43

## 2023-07-15 RX ADMIN — MULTIPLE VITAMINS W/ MINERALS TAB 1 TABLET: TAB at 08:43

## 2023-07-15 RX ADMIN — WHITE PETROLATUM 1 APPLICATION: 1.75 OINTMENT TOPICAL at 20:08

## 2023-07-15 RX ADMIN — WHITE PETROLATUM 1 APPLICATION: 1.75 OINTMENT TOPICAL at 11:16

## 2023-07-15 RX ADMIN — Medication 10 ML: at 08:45

## 2023-07-15 RX ADMIN — RISPERIDONE 3 MG: 3 TABLET ORAL at 20:07

## 2023-07-15 NOTE — PLAN OF CARE
Goal Outcome Evaluation:  Plan of Care Reviewed With: patient        Progress: no change  Outcome Evaluation: Patient alert to self. Confused. Transfer from [facility]. Sitter at bedside. No complaints of pain. Wound care and skin care completed. No other issues/needs at this time.

## 2023-07-15 NOTE — NURSING NOTE
Patient refused vitals after several attempts. Patient is on a heart monitor, pulse and rhythm stable at this time.
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
## 2023-07-15 NOTE — THERAPY TREATMENT NOTE
Acute Care - Physical Therapy Treatment Note  [facility]     Patient Name: Alec Medrano  : 1970  MRN: [id number]  Today's Date: 7/15/2023      Visit Dx:     ICD-10-CM ICD-9-CM   1. Hypothermia due to cold environment  T68.XXXA 991.6   2. Hypernatremia  E87.0 276.0   3. Encephalopathy acute  G93.40 348.30   4. Bradycardia  R00.1 427.89   5. Hypotension, unspecified hypotension type  I95.9 458.9   6. Decreased activities of daily living (ADL)  Z78.9 V49.89   7. Difficulty walking  R26.2 719.7     Patient Active Problem List   Diagnosis    Hypothermia     Past Medical History:   Diagnosis Date    COPD (chronic obstructive pulmonary disease)     Diabetes mellitus     Hyperlipidemia     Hypertension     Hypotension     Pneumothorax     Pulmonary emboli     Unresponsive episode      Past Surgical History:   Procedure Laterality Date    SPLENECTOMY       PT Assessment (last 12 hours)       PT Evaluation and Treatment       Row Name 07/15/23 1400          Physical Therapy Time and Intention    Subjective Information no complaints (P)   -RF     Document Type therapy note (daily note) (P)   -RF     Mode of Treatment individual therapy;physical therapy (P)   -RF     Patient Effort good (P)   -RF     Symptoms Noted During/After Treatment none (P)   -RF       Row Name 07/15/23 1400          Cognition    Affect/Mental Status (Cognition) confabulatory;confused (P)   -RF     Orientation Status (Cognition) oriented x 4 (P)   -RF       Row Name 07/15/23 1400          Bed Mobility    Bed Mobility supine-sit;sit-supine (P)   -RF     Supine-Sit [place] (Bed Mobility) independent (P)   -RF     Sit-Supine [place] (Bed Mobility) independent (P)   -RF       Row Name 07/15/23 1400          Transfers    Transfers sit-stand transfer;stand-sit transfer (P)   -RF       Row Name 07/15/23 1400          Sit-Stand Transfer    Sit-Stand [place] (Transfers) modified independence (P)   -RF     Assistive Device (Sit-Stand Transfers)  walker, front-wheeled (P)   -RF       Row Name 07/15/23 1400          Stand-Sit Transfer    Stand-Sit [place] (Transfers) modified independence (P)   -RF     Assistive Device (Stand-Sit Transfers) walker, front-wheeled (P)   -RF       Row Name 07/15/23 1400          Gait/Stairs (Locomotion)    Gait/Stairs Locomotion gait/ambulation assistive device (P)   -RF     [place] Level (Gait) modified independence (P)   -RF     Assistive Device (Gait) walker, front-wheeled (P)   -RF     Distance in Feet (Gait) 400 (P)   -RF     Pattern (Gait) step-through (P)   -RF     Deviations/Abnormal Patterns (Gait) stride length decreased (P)   -RF     Comment, (Gait/Stairs) Patient reported that he could walk further, but was ready to get back to his food. (P)   -RF       Row Name 07/15/23 1400          Safety Issues, Functional Mobility    Impairments Affecting Function (Mobility) balance;strength;endurance/activity tolerance;cognition (P)   -RF       Row Name 07/15/23 1400          Balance    Balance Assessment sitting dynamic balance;sit to stand dynamic balance;standing dynamic balance (P)   -RF     Dynamic Sitting Balance independent (P)   -RF     Position, Sitting Balance unsupported;sitting edge of bed (P)   -RF     Sit to Stand Dynamic Balance modified independence (P)   -RF     Dynamic Standing Balance modified independence (P)   -RF     Position/Device Used, Standing Balance supported;walker, front-wheeled (P)   -RF       Row Name 07/15/23 1400          Motor Skills    Therapeutic Exercise other (see comments) (P)   Therapeutic exercise declined today due to the patient wanting to return to bed.  -RF       Row Name             Wound 07/09/23 1015 Left anterior knee Abrasion    Wound - Properties Group Placement Date: 07/09/23  -KP Placement Time: 1015  -KP Present on Hospital Admission: Y  -KP Side: Left  -KP Orientation: anterior  -KP Location: knee  -KP Primary Wound Type: Abrasion  -KP    Retired Wound -  Properties Group Placement Date: 07/09/23  -KP Placement Time: 1015  -KP Present on Hospital Admission: Y  -KP Side: Left  -KP Orientation: anterior  -KP Location: knee  -KP Primary Wound Type: Abrasion  -KP    Retired Wound - Properties Group Date first assessed: 07/09/23  -KP Time first assessed: 1015  -KP Present on Hospital Admission: Y  -KP Side: Left  -KP Location: knee  -KP Primary Wound Type: Abrasion  -KP      Row Name             Wound 07/09/23 1015 Right anterior greater trochanter Abrasion    Wound - Properties Group Placement Date: 07/09/23  -KP Placement Time: 1015  -KP Side: Right  -KP Orientation: anterior  -KP Location: greater trochanter  -KP Primary Wound Type: Abrasion  -KP    Retired Wound - Properties Group Placement Date: 07/09/23  -KP Placement Time: 1015  -KP Side: Right  -KP Orientation: anterior  -KP Location: greater trochanter  -KP Primary Wound Type: Abrasion  -KP    Retired Wound - Properties Group Date first assessed: 07/09/23  -KP Time first assessed: 1015  -KP Side: Right  -KP Location: greater trochanter  -KP Primary Wound Type: Abrasion  -KP      Row Name             Wound 07/11/23 1455 Left anterior second toe Neuropathic    Wound - Properties Group Placement Date: 07/11/23  -FL Placement Time: 1455  -FL Side: Left  -FL Orientation: anterior  -FL Location: second toe  -FL Primary Wound Type: Neuropathic  -FL    Retired Wound - Properties Group Placement Date: 07/11/23  -FL Placement Time: 1455  -FL Side: Left  -FL Orientation: anterior  -FL Location: second toe  -FL Primary Wound Type: Neuropathic  -FL    Retired Wound - Properties Group Date first assessed: 07/11/23  -FL Time first assessed: 1455  -FL Side: Left  -FL Location: second toe  -FL Primary Wound Type: Neuropathic  -FL      Row Name 07/15/23 1400          Positioning and Restraints    Pre-Treatment Position in bed (P)   -RF     Post Treatment Position bed (P)   -RF     In Bed supine;call light within reach;patient  within staff view;with family/caregiver (P)   -RF       Row Name 07/15/23 1400          Progress Summary (PT)    Daily Progress Summary (PT) The patient tolerated treatment well with no onset or change in symptoms. Patient continues to do well with skilled physical therapy intervention. (P)   -RF       Row Name 07/15/23 1400          Physical Therapy Goals    Bed Mobility Goal Selection (PT) bed mobility, PT goal 1  -DP (r) RF (t) DP (c)     Transfer Goal Selection (PT) transfer, PT goal 1  -DP (r) RF (t) DP (c)     Gait Training Goal Selection (PT) gait training, PT goal 1  -DP (r) RF (t) DP (c)       Row Name 07/15/23 1400          Bed Mobility Goal 1 (PT)    Activity/Assistive Device (Bed Mobility Goal 1, PT) bed mobility activities, all  -DP (r) RF (t) DP (c)     [place] Level/Cues Needed (Bed Mobility Goal 1, PT) independent  -DP (r) RF (t) DP (c)     Time Frame (Bed Mobility Goal 1, PT) long term goal (LTG);10 days  -DP (r) RF (t) DP (c)     Progress/Outcomes (Bed Mobility Goal 1, PT) goal met  -DP (r) RF (t) DP (c)       Row Name 07/15/23 1400          Transfer Goal 1 (PT)    Activity/Assistive Device (Transfer Goal 1, PT) transfers, all  -DP (r) RF (t) DP (c)     [place] Level/Cues Needed (Transfer Goal 1, PT) independent  -DP (r) RF (t) DP (c)     Time Frame (Transfer Goal 1, PT) long term goal (LTG);10 days  -DP (r) RF (t) DP (c)     Progress/Outcome (Transfer Goal 1, PT) goal met  -DP (r) RF (t) DP (c)       Row Name 07/15/23 1400          Gait Training Goal 1 (PT)    Activity/Assistive Device (Gait Training Goal 1, PT) gait (walking locomotion)  -DP (r) RF (t) DP (c)     [place] Level (Gait Training Goal 1, PT) independent  -DP (r) RF (t) DP (c)     Distance (Gait Training Goal 1, PT) 200 (P)   -RF     Time Frame (Gait Training Goal 1, PT) long term goal (LTG);10 days (P)   -RF     Progress/Outcome (Gait Training Goal 1, PT) good progress toward goal (P)   -RF               User Key  (r) =  Recorded By, (t) = Taken By, (c) = Cosigned By      Initials Name Provider Type    KP Angelika Valdez, RN Registered Nurse    Kandice Chapin, RN Registered Nurse    Rukhsana Walker, PT Physical Therapist    Carrillo Martinez, PT Student PT Student                    Physical Therapy Education       Title: PT OT SLP Therapies (Done)       Topic: Physical Therapy (Done)       Point: Mobility training (Done)       Learning Progress Summary             Patient Acceptance, E,TB, VU by DP at 7/11/2023 1033                         Point: Home exercise program (Done)       Learning Progress Summary             Patient Acceptance, E,TB, VU by DP at 7/11/2023 1033                         Point: Body mechanics (Done)       Learning Progress Summary             Patient Acceptance, E,TB, VU by ELLEN at 7/11/2023 1033                         Point: Precautions (Done)       Learning Progress Summary             Patient Acceptance, E,TB, VU by DP at 7/11/2023 1033                                         User Key       Initials Effective Dates Name Provider Type Discipline    DP 06/03/21 -  Rukhsana Vega, PT Physical Therapist PT                  PT Recommendation and Plan     Progress Summary (PT)  Daily Progress Summary (PT): (P) The patient tolerated treatment well with no onset or change in symptoms. Patient continues to do well with skilled physical therapy intervention.   Outcome Measures       Row Name 07/14/23 1600 07/13/23 1400          How much help from another person do you currently need...    Turning from your back to your side while in flat bed without using bedrails? 4  -CS 4  -REED (r) MB (t) REED (c)     Moving from lying on back to sitting on the side of a flat bed without bedrails? 4  -CS 4  -REED (r) MB (t) REED (c)     Moving to and from a bed to a chair (including a wheelchair)? 3  -CS 3  -REED (r) MB (t) REED (c)     Standing up from a chair using your arms (e.g., wheelchair, bedside chair)? 3  -CS 3  -REED (r) MB (t)  REED (c)     Climbing 3-5 steps with a railing? 3  -CS 3  -REED (r) MB (t) REED (c)     To walk in hospital room? 3  -CS 4  -REED (r) MB (t) REED (c)     AM-PAC 6 Clicks Score (PT) 20  -CS 21  -REED (r) MB (t)        Functional Assessment    Outcome Measure Options AM-PAC 6 Clicks Basic Mobility (PT)  -CS --               User Key  (r) = Recorded By, (t) = Taken By, (c) = Cosigned By      Initials Name Provider Type    Allan Connor, PT Physical Therapist    CS Yousuf Warner, PTA Physical Therapist Assistant    Yg Rebollar, PT Student PT Student                     Time Calculation:    PT Charges       Row Name 07/15/23 1458             Time Calculation    PT Received On 07/15/23  -DP (r) RF (t) DP (c)         Timed Charges    66692 - Gait Training Minutes  8  -DP (r) RF (t) DP (c)      93390 - PT Therapeutic Activity Minutes 3  -DP (r) RF (t) DP (c)         Total Minutes    Timed Charges Total Minutes 11  -DP (r) RF (t)       Total Minutes 11  -DP (r) RF (t)                User Key  (r) = Recorded By, (t) = Taken By, (c) = Cosigned By      Initials Name Provider Type    Rukhsana Walker, PT Physical Therapist    RF Carrillo Farrar, PT Student PT Student                  Therapy Charges for Today       Code Description Service Date Service Provider Modifiers Qty    [id number] HC GAIT TRAINING EA 15 MIN 7/15/2023 Carrillo Farrar, PT Student GP 1            PT G-Codes  Outcome Measure Options: AM-PAC 6 Clicks Basic Mobility (PT)  AM-PAC 6 Clicks Score (PT): 20  AM-PAC 6 Clicks Score (OT): 15    Carrillo Farrar PT Student  7/15/2023
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
## 2023-07-16 LAB
ALBUMIN SERPL-MCNC: 3.1 G/DL (ref 3.5–5.2)
ALBUMIN/GLOB SERPL: 0.9 G/DL
ALP SERPL-CCNC: 171 U/L (ref 39–117)
ALT SERPL W P-5'-P-CCNC: 51 U/L (ref 1–41)
ANION GAP SERPL CALCULATED.3IONS-SCNC: 9.5 MMOL/L (ref 5–15)
AST SERPL-CCNC: 27 U/L (ref 1–40)
BASOPHILS # BLD AUTO: 0.04 10*3/MM3 (ref 0–0.2)
BASOPHILS NFR BLD AUTO: 0.3 % (ref 0–1.5)
BILIRUB SERPL-MCNC: 0.2 MG/DL (ref 0–1.2)
BUN SERPL-MCNC: 19 MG/DL (ref 6–20)
BUN/CREAT SERPL: 16 (ref 7–25)
CALCIUM SPEC-SCNC: 9.8 MG/DL (ref 8.6–10.5)
CHLORIDE SERPL-SCNC: 104 MMOL/L (ref 98–107)
CO2 SERPL-SCNC: 25.5 MMOL/L (ref 22–29)
CREAT SERPL-MCNC: 1.19 MG/DL (ref 0.76–1.27)
DEPRECATED RDW RBC AUTO: 54.1 FL (ref 37–54)
EGFRCR SERPLBLD CKD-EPI 2021: 73.5 ML/MIN/1.73
EOSINOPHIL # BLD AUTO: 0.31 10*3/MM3 (ref 0–0.4)
EOSINOPHIL NFR BLD AUTO: 2 % (ref 0.3–6.2)
ERYTHROCYTE [DISTWIDTH] IN BLOOD BY AUTOMATED COUNT: 16.3 % (ref 12.3–15.4)
GLOBULIN UR ELPH-MCNC: 3.5 GM/DL
GLUCOSE BLDC GLUCOMTR-MCNC: 138 MG/DL (ref 70–99)
GLUCOSE BLDC GLUCOMTR-MCNC: 139 MG/DL (ref 70–99)
GLUCOSE BLDC GLUCOMTR-MCNC: 145 MG/DL (ref 70–99)
GLUCOSE BLDC GLUCOMTR-MCNC: 147 MG/DL (ref 70–99)
GLUCOSE BLDC GLUCOMTR-MCNC: 191 MG/DL (ref 70–99)
GLUCOSE SERPL-MCNC: 218 MG/DL (ref 65–99)
HCT VFR BLD AUTO: 40.5 % (ref 37.5–51)
HGB BLD-MCNC: 13.3 G/DL (ref 13–17.7)
IMM GRANULOCYTES # BLD AUTO: 0.1 10*3/MM3 (ref 0–0.05)
IMM GRANULOCYTES NFR BLD AUTO: 0.7 % (ref 0–0.5)
LYMPHOCYTES # BLD AUTO: 2.42 10*3/MM3 (ref 0.7–3.1)
LYMPHOCYTES NFR BLD AUTO: 16 % (ref 19.6–45.3)
MAGNESIUM SERPL-MCNC: 2 MG/DL (ref 1.6–2.6)
MCH RBC QN AUTO: 30.4 PG (ref 26.6–33)
MCHC RBC AUTO-ENTMCNC: 32.8 G/DL (ref 31.5–35.7)
MCV RBC AUTO: 92.5 FL (ref 79–97)
MONOCYTES # BLD AUTO: 1.18 10*3/MM3 (ref 0.1–0.9)
MONOCYTES NFR BLD AUTO: 7.8 % (ref 5–12)
NEUTROPHILS NFR BLD AUTO: 11.1 10*3/MM3 (ref 1.7–7)
NEUTROPHILS NFR BLD AUTO: 73.2 % (ref 42.7–76)
NRBC BLD AUTO-RTO: 0 /100 WBC (ref 0–0.2)
PHOSPHATE SERPL-MCNC: 2.8 MG/DL (ref 2.5–4.5)
PLATELET # BLD AUTO: 257 10*3/MM3 (ref 140–450)
PMV BLD AUTO: 11.6 FL (ref 6–12)
POTASSIUM SERPL-SCNC: 4.3 MMOL/L (ref 3.5–5.2)
PROT SERPL-MCNC: 6.6 G/DL (ref 6–8.5)
RBC # BLD AUTO: 4.38 10*6/MM3 (ref 4.14–5.8)
SODIUM SERPL-SCNC: 139 MMOL/L (ref 136–145)
WBC NRBC COR # BLD: 15.15 10*3/MM3 (ref 3.4–10.8)

## 2023-07-16 PROCEDURE — 80053 COMPREHEN METABOLIC PANEL: CPT | Performed by: INTERNAL MEDICINE

## 2023-07-16 PROCEDURE — 25010000002 HALOPERIDOL LACTATE PER 5 MG: Performed by: PHYSICIAN ASSISTANT

## 2023-07-16 PROCEDURE — 63710000001 INSULIN LISPRO (HUMAN) PER 5 UNITS: Performed by: INTERNAL MEDICINE

## 2023-07-16 PROCEDURE — 83735 ASSAY OF MAGNESIUM: CPT | Performed by: INTERNAL MEDICINE

## 2023-07-16 PROCEDURE — 82948 REAGENT STRIP/BLOOD GLUCOSE: CPT

## 2023-07-16 PROCEDURE — 25010000002 ENOXAPARIN PER 10 MG: Performed by: INTERNAL MEDICINE

## 2023-07-16 PROCEDURE — 85025 COMPLETE CBC W/AUTO DIFF WBC: CPT | Performed by: INTERNAL MEDICINE

## 2023-07-16 PROCEDURE — 94799 UNLISTED PULMONARY SVC/PX: CPT

## 2023-07-16 PROCEDURE — 84100 ASSAY OF PHOSPHORUS: CPT | Performed by: INTERNAL MEDICINE

## 2023-07-16 PROCEDURE — 99231 SBSQ HOSP IP/OBS SF/LOW 25: CPT | Performed by: INTERNAL MEDICINE

## 2023-07-16 PROCEDURE — 36415 COLL VENOUS BLD VENIPUNCTURE: CPT | Performed by: INTERNAL MEDICINE

## 2023-07-16 RX ADMIN — MULTIPLE VITAMINS W/ MINERALS TAB 1 TABLET: TAB at 08:36

## 2023-07-16 RX ADMIN — ENOXAPARIN SODIUM 40 MG: 100 INJECTION SUBCUTANEOUS at 04:30

## 2023-07-16 RX ADMIN — HALOPERIDOL LACTATE 1 MG: 5 INJECTION, SOLUTION INTRAMUSCULAR at 20:43

## 2023-07-16 RX ADMIN — WHITE PETROLATUM 1 APPLICATION: 1.75 OINTMENT TOPICAL at 14:28

## 2023-07-16 RX ADMIN — Medication 10 ML: at 08:37

## 2023-07-16 RX ADMIN — RISPERIDONE 3 MG: 3 TABLET ORAL at 08:36

## 2023-07-16 RX ADMIN — CYANOCOBALAMIN TAB 500 MCG 1000 MCG: 500 TAB at 08:36

## 2023-07-16 RX ADMIN — TRAZODONE HYDROCHLORIDE 100 MG: 100 TABLET ORAL at 20:09

## 2023-07-16 RX ADMIN — LIDOCAINE 2 PATCH: 700 PATCH TOPICAL at 08:36

## 2023-07-16 RX ADMIN — DIVALPROEX SODIUM 250 MG: 250 TABLET, DELAYED RELEASE ORAL at 08:36

## 2023-07-16 RX ADMIN — INSULIN LISPRO 2 UNITS: 100 INJECTION, SOLUTION INTRAVENOUS; SUBCUTANEOUS at 20:09

## 2023-07-16 RX ADMIN — ENOXAPARIN SODIUM 40 MG: 100 INJECTION SUBCUTANEOUS at 08:36

## 2023-07-16 RX ADMIN — RISPERIDONE 3 MG: 3 TABLET ORAL at 20:09

## 2023-07-16 RX ADMIN — DIVALPROEX SODIUM 250 MG: 250 TABLET, DELAYED RELEASE ORAL at 20:09

## 2023-07-16 RX ADMIN — HALOPERIDOL LACTATE 1 MG: 5 INJECTION, SOLUTION INTRAMUSCULAR at 06:12
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
## 2023-07-16 NOTE — PLAN OF CARE
Goal Outcome Evaluation:  Plan of Care Reviewed With: patient        Progress: no change  Outcome Evaluation: Patient alert to self. Confused. Sitter at bedside. No complaints of pain. Wound care and skin care completed. No other issues/needs at this time.

## 2023-07-17 LAB
ALBUMIN SERPL-MCNC: 2.8 G/DL (ref 3.5–5.2)
ALBUMIN/GLOB SERPL: 0.8 G/DL
ALP SERPL-CCNC: 124 U/L (ref 39–117)
ALT SERPL W P-5'-P-CCNC: 38 U/L (ref 1–41)
ANION GAP SERPL CALCULATED.3IONS-SCNC: 7.8 MMOL/L (ref 5–15)
AST SERPL-CCNC: 20 U/L (ref 1–40)
BASOPHILS # BLD AUTO: 0.04 10*3/MM3 (ref 0–0.2)
BASOPHILS NFR BLD AUTO: 0.3 % (ref 0–1.5)
BILIRUB SERPL-MCNC: <0.2 MG/DL (ref 0–1.2)
BUN SERPL-MCNC: 24 MG/DL (ref 6–20)
BUN/CREAT SERPL: 19.5 (ref 7–25)
CALCIUM SPEC-SCNC: 9.2 MG/DL (ref 8.6–10.5)
CHLORIDE SERPL-SCNC: 108 MMOL/L (ref 98–107)
CO2 SERPL-SCNC: 25.2 MMOL/L (ref 22–29)
CREAT SERPL-MCNC: 1.23 MG/DL (ref 0.76–1.27)
DEPRECATED RDW RBC AUTO: 55 FL (ref 37–54)
EGFRCR SERPLBLD CKD-EPI 2021: 70.6 ML/MIN/1.73
EOSINOPHIL # BLD AUTO: 0.33 10*3/MM3 (ref 0–0.4)
EOSINOPHIL NFR BLD AUTO: 2.8 % (ref 0.3–6.2)
ERYTHROCYTE [DISTWIDTH] IN BLOOD BY AUTOMATED COUNT: 16.2 % (ref 12.3–15.4)
GLOBULIN UR ELPH-MCNC: 3.3 GM/DL
GLUCOSE BLDC GLUCOMTR-MCNC: 101 MG/DL (ref 70–99)
GLUCOSE BLDC GLUCOMTR-MCNC: 182 MG/DL (ref 70–99)
GLUCOSE BLDC GLUCOMTR-MCNC: 189 MG/DL (ref 70–99)
GLUCOSE BLDC GLUCOMTR-MCNC: 198 MG/DL (ref 70–99)
GLUCOSE SERPL-MCNC: 178 MG/DL (ref 65–99)
HCT VFR BLD AUTO: 36.6 % (ref 37.5–51)
HGB BLD-MCNC: 12.1 G/DL (ref 13–17.7)
IMM GRANULOCYTES # BLD AUTO: 0.11 10*3/MM3 (ref 0–0.05)
IMM GRANULOCYTES NFR BLD AUTO: 0.9 % (ref 0–0.5)
LYMPHOCYTES # BLD AUTO: 2.57 10*3/MM3 (ref 0.7–3.1)
LYMPHOCYTES NFR BLD AUTO: 21.5 % (ref 19.6–45.3)
MAGNESIUM SERPL-MCNC: 1.9 MG/DL (ref 1.6–2.6)
MCH RBC QN AUTO: 31 PG (ref 26.6–33)
MCHC RBC AUTO-ENTMCNC: 33.1 G/DL (ref 31.5–35.7)
MCV RBC AUTO: 93.8 FL (ref 79–97)
MONOCYTES # BLD AUTO: 1.26 10*3/MM3 (ref 0.1–0.9)
MONOCYTES NFR BLD AUTO: 10.5 % (ref 5–12)
NEUTROPHILS NFR BLD AUTO: 64 % (ref 42.7–76)
NEUTROPHILS NFR BLD AUTO: 7.64 10*3/MM3 (ref 1.7–7)
NRBC BLD AUTO-RTO: 0 /100 WBC (ref 0–0.2)
PHOSPHATE SERPL-MCNC: 3.5 MG/DL (ref 2.5–4.5)
PLATELET # BLD AUTO: 241 10*3/MM3 (ref 140–450)
PMV BLD AUTO: 10.6 FL (ref 6–12)
POTASSIUM SERPL-SCNC: 4.1 MMOL/L (ref 3.5–5.2)
PROT SERPL-MCNC: 6.1 G/DL (ref 6–8.5)
RBC # BLD AUTO: 3.9 10*6/MM3 (ref 4.14–5.8)
SODIUM SERPL-SCNC: 141 MMOL/L (ref 136–145)
WBC NRBC COR # BLD: 11.95 10*3/MM3 (ref 3.4–10.8)

## 2023-07-17 PROCEDURE — 63710000001 INSULIN LISPRO (HUMAN) PER 5 UNITS: Performed by: INTERNAL MEDICINE

## 2023-07-17 PROCEDURE — 82948 REAGENT STRIP/BLOOD GLUCOSE: CPT

## 2023-07-17 PROCEDURE — 83735 ASSAY OF MAGNESIUM: CPT | Performed by: INTERNAL MEDICINE

## 2023-07-17 PROCEDURE — 36415 COLL VENOUS BLD VENIPUNCTURE: CPT | Performed by: INTERNAL MEDICINE

## 2023-07-17 PROCEDURE — 84100 ASSAY OF PHOSPHORUS: CPT | Performed by: INTERNAL MEDICINE

## 2023-07-17 PROCEDURE — 94799 UNLISTED PULMONARY SVC/PX: CPT

## 2023-07-17 PROCEDURE — 85025 COMPLETE CBC W/AUTO DIFF WBC: CPT | Performed by: INTERNAL MEDICINE

## 2023-07-17 PROCEDURE — 99232 SBSQ HOSP IP/OBS MODERATE 35: CPT | Performed by: INTERNAL MEDICINE

## 2023-07-17 PROCEDURE — 97116 GAIT TRAINING THERAPY: CPT

## 2023-07-17 PROCEDURE — 94618 PULMONARY STRESS TESTING: CPT

## 2023-07-17 PROCEDURE — 25010000002 FUROSEMIDE PER 20 MG: Performed by: INTERNAL MEDICINE

## 2023-07-17 PROCEDURE — 80053 COMPREHEN METABOLIC PANEL: CPT | Performed by: INTERNAL MEDICINE

## 2023-07-17 RX ORDER — FUROSEMIDE 10 MG/ML
40 INJECTION INTRAMUSCULAR; INTRAVENOUS ONCE
Status: COMPLETED | OUTPATIENT
Start: 2023-07-17 | End: 2023-07-17

## 2023-07-17 RX ADMIN — Medication 10 ML: at 09:27

## 2023-07-17 RX ADMIN — RISPERIDONE 3 MG: 3 TABLET ORAL at 09:27

## 2023-07-17 RX ADMIN — CYANOCOBALAMIN TAB 500 MCG 1000 MCG: 500 TAB at 09:26

## 2023-07-17 RX ADMIN — MULTIPLE VITAMINS W/ MINERALS TAB 1 TABLET: TAB at 09:27

## 2023-07-17 RX ADMIN — WHITE PETROLATUM 1 APPLICATION: 1.75 OINTMENT TOPICAL at 23:33

## 2023-07-17 RX ADMIN — INSULIN LISPRO 2 UNITS: 100 INJECTION, SOLUTION INTRAVENOUS; SUBCUTANEOUS at 09:26

## 2023-07-17 RX ADMIN — TRAZODONE HYDROCHLORIDE 100 MG: 100 TABLET ORAL at 23:18

## 2023-07-17 RX ADMIN — RISPERIDONE 3 MG: 3 TABLET ORAL at 23:18

## 2023-07-17 RX ADMIN — INSULIN LISPRO 2 UNITS: 100 INJECTION, SOLUTION INTRAVENOUS; SUBCUTANEOUS at 23:33

## 2023-07-17 RX ADMIN — DIVALPROEX SODIUM 250 MG: 250 TABLET, DELAYED RELEASE ORAL at 09:26

## 2023-07-17 RX ADMIN — DIVALPROEX SODIUM 250 MG: 250 TABLET, DELAYED RELEASE ORAL at 23:18

## 2023-07-17 RX ADMIN — FUROSEMIDE 40 MG: 10 INJECTION, SOLUTION INTRAMUSCULAR; INTRAVENOUS at 17:15

## 2023-07-17 RX ADMIN — LIDOCAINE 2 PATCH: 700 PATCH TOPICAL at 09:28

## 2023-07-17 RX ADMIN — INSULIN LISPRO 2 UNITS: 100 INJECTION, SOLUTION INTRAVENOUS; SUBCUTANEOUS at 17:15

## 2023-07-17 RX ADMIN — Medication 10 ML: at 23:17

## 2023-07-17 RX ADMIN — WHITE PETROLATUM 1 APPLICATION: 1.75 OINTMENT TOPICAL at 09:28

## 2023-07-17 NOTE — THERAPY TREATMENT NOTE
Acute Care - Physical Therapy Treatment Note   [facility]     Patient Name: Alec Medrano  : 1970  MRN: [id number]  Today's Date: 2023      Visit Dx:     ICD-10-CM ICD-9-CM   1. Hypothermia due to cold environment  T68.XXXA 991.6   2. Hypernatremia  E87.0 276.0   3. Encephalopathy acute  G93.40 348.30   4. Bradycardia  R00.1 427.89   5. Hypotension, unspecified hypotension type  I95.9 458.9   6. Decreased activities of daily living (ADL)  Z78.9 V49.89   7. Difficulty walking  R26.2 719.7     Patient Active Problem List   Diagnosis    Hypothermia     Past Medical History:   Diagnosis Date    COPD (chronic obstructive pulmonary disease)     Diabetes mellitus     Hyperlipidemia     Hypertension     Hypotension     Pneumothorax     Pulmonary emboli     Unresponsive episode      Past Surgical History:   Procedure Laterality Date    SPLENECTOMY       PT Assessment (last 12 hours)       PT Evaluation and Treatment       Row Name 23 1000          Physical Therapy Time and Intention    Subjective Information no complaints (P)   -RF     Document Type discharge treatment (P)   -RF     Mode of Treatment individual therapy;physical therapy (P)   -RF     Patient Effort good (P)   -RF     Symptoms Noted During/After Treatment none (P)   -RF       Row Name 23 1000          Cognition    Affect/Mental Status (Cognition) confabulatory;confused (P)   -RF     Orientation Status (Cognition) oriented to;person;place;verbal cues/prompts needed for orientation (P)   -RF       Row Name 23 1000          Bed Mobility    Bed Mobility other (see comments) (P)   Patient standing upon therapist entry  -RF       Row Name 23 1000          Transfers    Transfers other (see comments) (P)   Patient standing upon therapist entry, and patient returned to room and remained standing  -RF       Row Name 23 1000          Gait/Stairs (Locomotion)    Gait/Stairs Locomotion gait/ambulation independence (P)   -RF      [place] Level (Gait) independent (P)   -RF     Distance in Feet (Gait) 400 (P)   -RF     Pattern (Gait) step-through (P)   -RF     Deviations/Abnormal Patterns (Gait) stride length decreased (P)   -RF       Row Name 07/17/23 1000          Balance    Balance Assessment standing static balance;standing dynamic balance (P)   -RF     Static Standing Balance independent (P)   -RF     Dynamic Standing Balance independent (P)   -RF     Position/Device Used, Standing Balance unsupported (P)   -RF       Row Name 07/17/23 1000          Motor Skills    Therapeutic Exercise other (see comments) (P)   Therapeutic exercise deferred today due to entry of other care providers.  -RF       Row Name             Wound 07/09/23 1015 Left anterior knee Abrasion    Wound - Properties Group Placement Date: 07/09/23  -KP Placement Time: 1015  -KP Present on Hospital Admission: Y  -KP Side: Left  -KP Orientation: anterior  -KP Location: knee  -KP Primary Wound Type: Abrasion  -KP    Retired Wound - Properties Group Placement Date: 07/09/23  -KP Placement Time: 1015  -KP Present on Hospital Admission: Y  -KP Side: Left  -KP Orientation: anterior  -KP Location: knee  -KP Primary Wound Type: Abrasion  -KP    Retired Wound - Properties Group Date first assessed: 07/09/23  -KP Time first assessed: 1015  -KP Present on Hospital Admission: Y  -KP Side: Left  -KP Location: knee  -KP Primary Wound Type: Abrasion  -KP      Row Name             Wound 07/09/23 1015 Right anterior greater trochanter Abrasion    Wound - Properties Group Placement Date: 07/09/23  -KP Placement Time: 1015  -KP Side: Right  -KP Orientation: anterior  -KP Location: greater trochanter  -KP Primary Wound Type: Abrasion  -KP    Retired Wound - Properties Group Placement Date: 07/09/23  -KP Placement Time: 1015  -KP Side: Right  -KP Orientation: anterior  -KP Location: greater trochanter  -KP Primary Wound Type: Abrasion  -KP    Retired Wound - Properties Group Date first  assessed: 07/09/23  -KP Time first assessed: 1015  -KP Side: Right  -KP Location: greater trochanter  -KP Primary Wound Type: Abrasion  -KP      Row Name             Wound 07/11/23 1455 Left anterior second toe Neuropathic    Wound - Properties Group Placement Date: 07/11/23  -FL Placement Time: 1455  -FL Side: Left  -FL Orientation: anterior  -FL Location: second toe  -FL Primary Wound Type: Neuropathic  -FL    Retired Wound - Properties Group Placement Date: 07/11/23  -FL Placement Time: 1455  -FL Side: Left  -FL Orientation: anterior  -FL Location: second toe  -FL Primary Wound Type: Neuropathic  -FL    Retired Wound - Properties Group Date first assessed: 07/11/23  -FL Time first assessed: 1455  -FL Side: Left  -FL Location: second toe  -FL Primary Wound Type: Neuropathic  -FL      Row Name 07/17/23 1000          Positioning and Restraints    Pre-Treatment Position standing in room (P)   -RF     Post Treatment Position other (P)   Standing in room  -RF       Row Name 07/17/23 1000          Therapy Assessment/Plan (PT)    Criteria for Skilled Interventions Met (PT) does not meet criteria for skilled intervention (P)   -RF       Row Name 07/17/23 1000          Progress Summary (PT)    Progress Toward Functional Goals (PT) other (see comments) (P)   Patient has met functional goals and is able to transfer and ambulate at prior level of function. Plan to discharge from physical therapy services today.  -RF     Daily Progress Summary (PT) The patient tolerated treatment well today and was able to ambulate and transfer independently without the use of an assistive device. At this time, the patient has met all functional goals of therapy and is appropriate for discharge from skilled physical therapy services in the acute setting. Recommend the patient continue to ambulate with nursing while in the hospital. (P)   -RF       Row Name 07/17/23 1000          Therapy Plan Review/Discharge Plan (PT)    Therapy Plan Review  (PT) evaluation/treatment results reviewed;participants included;patient (P)   -RF       Row Name 07/17/23 1000          Physical Therapy Goals    Bed Mobility Goal Selection (PT) bed mobility, PT goal 1 (P)   -RF     Transfer Goal Selection (PT) transfer, PT goal 1 (P)   -RF     Gait Training Goal Selection (PT) gait training, PT goal 1 (P)   -RF       Row Name 07/17/23 1000          Bed Mobility Goal 1 (PT)    Activity/Assistive Device (Bed Mobility Goal 1, PT) bed mobility activities, all (P)   -RF     [place] Level/Cues Needed (Bed Mobility Goal 1, PT) independent (P)   -RF     Time Frame (Bed Mobility Goal 1, PT) long term goal (LTG);10 days (P)   -RF     Progress/Outcomes (Bed Mobility Goal 1, PT) goal met (P)   -RF       Row Name 07/17/23 1000          Transfer Goal 1 (PT)    Activity/Assistive Device (Transfer Goal 1, PT) transfers, all (P)   -RF     [place] Level/Cues Needed (Transfer Goal 1, PT) independent (P)   -RF     Time Frame (Transfer Goal 1, PT) long term goal (LTG);10 days (P)   -RF     Progress/Outcome (Transfer Goal 1, PT) goal met (P)   -RF       Row Name 07/17/23 1000          Gait Training Goal 1 (PT)    Activity/Assistive Device (Gait Training Goal 1, PT) gait (walking locomotion) (P)   -RF     [place] Level (Gait Training Goal 1, PT) independent (P)   -RF     Distance (Gait Training Goal 1, PT) 200 (P)   -RF     Time Frame (Gait Training Goal 1, PT) long term goal (LTG);10 days (P)   -RF     Progress/Outcome (Gait Training Goal 1, PT) goal met (P)   -RF               User Key  (r) = Recorded By, (t) = Taken By, (c) = Cosigned By      Initials Name Provider Type    Angelika Hill, RN Registered Nurse    Kandice Chapin RN Registered Nurse    Carrillo Martinez, PT Student PT Student                    Physical Therapy Education       Title: PT OT SLP Therapies (In Progress)       Topic: Physical Therapy (In Progress)       Point: Mobility training (In Progress)        Learning Progress Summary             Patient Acceptance, E, NR,NL by RF at 7/17/2023 0939    Acceptance, E,TB, VU by DP at 7/11/2023 1033                         Point: Home exercise program (In Progress)       Learning Progress Summary             Patient Acceptance, E, NR,NL by RF at 7/17/2023 0939    Acceptance, E,TB, VU by DP at 7/11/2023 1033                         Point: Body mechanics (In Progress)       Learning Progress Summary             Patient Acceptance, E, NR,NL by RF at 7/17/2023 0939    Acceptance, E,TB, VU by DP at 7/11/2023 1033                         Point: Precautions (In Progress)       Learning Progress Summary             Patient Acceptance, E, NR,NL by RF at 7/17/2023 0939    Acceptance, E,TB, VU by DP at 7/11/2023 1033                                         User Key       Initials Effective Dates Name Provider Type Discipline    DP 06/03/21 -  Ruhksana Vega, PT Physical Therapist PT     01/19/22 -  Devin Burnham RN Registered Nurse Nurse                  PT Recommendation and Plan     Progress Summary (PT)  Progress Toward Functional Goals (PT): (P) other (see comments) (Patient has met functional goals and is able to transfer and ambulate at prior level of function. Plan to discharge from physical therapy services today.)  Daily Progress Summary (PT): (P) The patient tolerated treatment well today and was able to ambulate and transfer independently without the use of an assistive device. At this time, the patient has met all functional goals of therapy and is appropriate for discharge from skilled physical therapy services in the acute setting. Recommend the patient continue to ambulate with nursing while in the hospital.   Outcome Measures       Row Name 07/17/23 1100 07/14/23 1600          How much help from another person do you currently need...    Turning from your back to your side while in flat bed without using bedrails? 4 (P)   -RF 4  -CS     Moving from lying on back to  sitting on the side of a flat bed without bedrails? 4 (P)   -RF 4  -CS     Moving to and from a bed to a chair (including a wheelchair)? 4 (P)   -RF 3  -CS     Standing up from a chair using your arms (e.g., wheelchair, bedside chair)? 4 (P)   -RF 3  -CS     Climbing 3-5 steps with a railing? 4 (P)   -RF 3  -CS     To walk in hospital room? 4 (P)   -RF 3  -CS     AM-PAC 6 Clicks Score (PT) 24 (P)   -RF 20  -CS        Functional Assessment    Outcome Measure Options -- AM-PAC 6 Clicks Basic Mobility (PT)  -CS               User Key  (r) = Recorded By, (t) = Taken By, (c) = Cosigned By      Initials Name Provider Type    Yousuf Aviles, PTA Physical Therapist Assistant    RF Carrillo Farrar, PT Student PT Student                     Time Calculation:    PT Charges       Row Name 07/17/23 1055             Time Calculation    PT Received On 07/17/23 (P)   -RF         Timed Charges    36421 - Gait Training Minutes  10 (P)   -RF         Total Minutes    Timed Charges Total Minutes 10 (P)   -RF       Total Minutes 10 (P)   -RF                User Key  (r) = Recorded By, (t) = Taken By, (c) = Cosigned By      Initials Name Provider Type    RF Carrillo Farrar PT Student PT Student                  Therapy Charges for Today       Code Description Service Date Service Provider Modifiers Qty    [id number] HC GAIT TRAINING EA 15 MIN 7/17/2023 Carrillo Farrar, PT Student GP 1            PT G-Codes  Outcome Measure Options: AM-PAC 6 Clicks Basic Mobility (PT)  AM-PAC 6 Clicks Score (PT): (P) 24  AM-PAC 6 Clicks Score (OT): 15    Carrillo Farrar PT Student  7/17/2023

## 2023-07-17 NOTE — NURSING NOTE
Exercise Oximetry    Patient Name:Alec Medrano   MRN: [id number]   Date: 07/17/23             ROOM AIR BASELINE   SpO2% 94   Heart Rate    Blood Pressure      EXERCISE ON ROOM AIR SpO2% EXERCISE ON O2 @  LPM SpO2%   1 MINUTE 93 1 MINUTE    2 MINUTES 94 2 MINUTES    3 MINUTES 95 3 MINUTES    4 MINUTES 94 4 MINUTES    5 MINUTES 93 5 MINUTES    6 MINUTES 94 6 MINUTES               Distance Walked   Distance Walked   Dyspnea (Won Scale)   Dyspnea (Won Scale)   Fatigue (Won Scale)   Fatigue (Won Scale)   SpO2% Post Exercise   SpO2% Post Exercise   HR Post Exercise   HR Post Exercise   Time to Recovery   Time to Recovery     Comments: passed 6mwt
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
## 2023-07-18 LAB
ALBUMIN SERPL-MCNC: 3.2 G/DL (ref 3.5–5.2)
ALBUMIN/GLOB SERPL: 0.9 G/DL
ALP SERPL-CCNC: 122 U/L (ref 39–117)
ALT SERPL W P-5'-P-CCNC: 39 U/L (ref 1–41)
ANION GAP SERPL CALCULATED.3IONS-SCNC: 10.1 MMOL/L (ref 5–15)
AST SERPL-CCNC: 22 U/L (ref 1–40)
BASOPHILS # BLD AUTO: 0.03 10*3/MM3 (ref 0–0.2)
BASOPHILS NFR BLD AUTO: 0.3 % (ref 0–1.5)
BILIRUB SERPL-MCNC: 0.2 MG/DL (ref 0–1.2)
BUN SERPL-MCNC: 23 MG/DL (ref 6–20)
BUN/CREAT SERPL: 18.9 (ref 7–25)
CALCIUM SPEC-SCNC: 9.8 MG/DL (ref 8.6–10.5)
CHLORIDE SERPL-SCNC: 104 MMOL/L (ref 98–107)
CO2 SERPL-SCNC: 24.9 MMOL/L (ref 22–29)
CREAT SERPL-MCNC: 1.22 MG/DL (ref 0.76–1.27)
DEPRECATED RDW RBC AUTO: 55.2 FL (ref 37–54)
EGFRCR SERPLBLD CKD-EPI 2021: 71.3 ML/MIN/1.73
EOSINOPHIL # BLD AUTO: 0.26 10*3/MM3 (ref 0–0.4)
EOSINOPHIL NFR BLD AUTO: 2.6 % (ref 0.3–6.2)
ERYTHROCYTE [DISTWIDTH] IN BLOOD BY AUTOMATED COUNT: 16.4 % (ref 12.3–15.4)
GLOBULIN UR ELPH-MCNC: 3.6 GM/DL
GLUCOSE BLDC GLUCOMTR-MCNC: 163 MG/DL (ref 70–99)
GLUCOSE BLDC GLUCOMTR-MCNC: 171 MG/DL (ref 70–99)
GLUCOSE BLDC GLUCOMTR-MCNC: 186 MG/DL (ref 70–99)
GLUCOSE BLDC GLUCOMTR-MCNC: 197 MG/DL (ref 70–99)
GLUCOSE SERPL-MCNC: 210 MG/DL (ref 65–99)
HCT VFR BLD AUTO: 39.2 % (ref 37.5–51)
HGB BLD-MCNC: 12.7 G/DL (ref 13–17.7)
IMM GRANULOCYTES # BLD AUTO: 0.1 10*3/MM3 (ref 0–0.05)
IMM GRANULOCYTES NFR BLD AUTO: 1 % (ref 0–0.5)
LYMPHOCYTES # BLD AUTO: 3.35 10*3/MM3 (ref 0.7–3.1)
LYMPHOCYTES NFR BLD AUTO: 32.9 % (ref 19.6–45.3)
MAGNESIUM SERPL-MCNC: 1.9 MG/DL (ref 1.6–2.6)
MCH RBC QN AUTO: 30.2 PG (ref 26.6–33)
MCHC RBC AUTO-ENTMCNC: 32.4 G/DL (ref 31.5–35.7)
MCV RBC AUTO: 93.3 FL (ref 79–97)
MONOCYTES # BLD AUTO: 0.94 10*3/MM3 (ref 0.1–0.9)
MONOCYTES NFR BLD AUTO: 9.2 % (ref 5–12)
NEUTROPHILS NFR BLD AUTO: 5.51 10*3/MM3 (ref 1.7–7)
NEUTROPHILS NFR BLD AUTO: 54 % (ref 42.7–76)
NRBC BLD AUTO-RTO: 0 /100 WBC (ref 0–0.2)
PHOSPHATE SERPL-MCNC: 3.8 MG/DL (ref 2.5–4.5)
PLATELET # BLD AUTO: 274 10*3/MM3 (ref 140–450)
PMV BLD AUTO: 10 FL (ref 6–12)
POTASSIUM SERPL-SCNC: 4.1 MMOL/L (ref 3.5–5.2)
PROT SERPL-MCNC: 6.8 G/DL (ref 6–8.5)
RBC # BLD AUTO: 4.2 10*6/MM3 (ref 4.14–5.8)
SODIUM SERPL-SCNC: 139 MMOL/L (ref 136–145)
WBC NRBC COR # BLD: 10.19 10*3/MM3 (ref 3.4–10.8)

## 2023-07-18 PROCEDURE — 82948 REAGENT STRIP/BLOOD GLUCOSE: CPT

## 2023-07-18 PROCEDURE — 83735 ASSAY OF MAGNESIUM: CPT | Performed by: INTERNAL MEDICINE

## 2023-07-18 PROCEDURE — 84100 ASSAY OF PHOSPHORUS: CPT | Performed by: INTERNAL MEDICINE

## 2023-07-18 PROCEDURE — 25010000002 HALOPERIDOL LACTATE PER 5 MG: Performed by: PHYSICIAN ASSISTANT

## 2023-07-18 PROCEDURE — 63710000001 INSULIN LISPRO (HUMAN) PER 5 UNITS: Performed by: INTERNAL MEDICINE

## 2023-07-18 PROCEDURE — 94799 UNLISTED PULMONARY SVC/PX: CPT

## 2023-07-18 PROCEDURE — 25010000002 ENOXAPARIN PER 10 MG: Performed by: INTERNAL MEDICINE

## 2023-07-18 PROCEDURE — 36415 COLL VENOUS BLD VENIPUNCTURE: CPT | Performed by: INTERNAL MEDICINE

## 2023-07-18 PROCEDURE — 80053 COMPREHEN METABOLIC PANEL: CPT | Performed by: INTERNAL MEDICINE

## 2023-07-18 PROCEDURE — 85025 COMPLETE CBC W/AUTO DIFF WBC: CPT | Performed by: INTERNAL MEDICINE

## 2023-07-18 PROCEDURE — 99232 SBSQ HOSP IP/OBS MODERATE 35: CPT | Performed by: STUDENT IN AN ORGANIZED HEALTH CARE EDUCATION/TRAINING PROGRAM

## 2023-07-18 PROCEDURE — 94760 N-INVAS EAR/PLS OXIMETRY 1: CPT

## 2023-07-18 RX ORDER — QUETIAPINE FUMARATE 25 MG/1
25 TABLET, FILM COATED ORAL DAILY PRN
Status: DISCONTINUED | OUTPATIENT
Start: 2023-07-18 | End: 2023-07-27

## 2023-07-18 RX ADMIN — INSULIN LISPRO 2 UNITS: 100 INJECTION, SOLUTION INTRAVENOUS; SUBCUTANEOUS at 12:30

## 2023-07-18 RX ADMIN — RISPERIDONE 3 MG: 3 TABLET ORAL at 09:41

## 2023-07-18 RX ADMIN — DIVALPROEX SODIUM 250 MG: 250 TABLET, DELAYED RELEASE ORAL at 22:04

## 2023-07-18 RX ADMIN — QUETIAPINE FUMARATE 25 MG: 25 TABLET ORAL at 16:21

## 2023-07-18 RX ADMIN — MULTIPLE VITAMINS W/ MINERALS TAB 1 TABLET: TAB at 09:40

## 2023-07-18 RX ADMIN — Medication 10 ML: at 22:04

## 2023-07-18 RX ADMIN — INSULIN LISPRO 2 UNITS: 100 INJECTION, SOLUTION INTRAVENOUS; SUBCUTANEOUS at 16:58

## 2023-07-18 RX ADMIN — RISPERIDONE 3 MG: 3 TABLET ORAL at 22:04

## 2023-07-18 RX ADMIN — WHITE PETROLATUM 1 APPLICATION: 1.75 OINTMENT TOPICAL at 22:06

## 2023-07-18 RX ADMIN — LIDOCAINE 2 PATCH: 700 PATCH TOPICAL at 09:41

## 2023-07-18 RX ADMIN — WHITE PETROLATUM 1 APPLICATION: 1.75 OINTMENT TOPICAL at 09:42

## 2023-07-18 RX ADMIN — HALOPERIDOL LACTATE 1 MG: 5 INJECTION, SOLUTION INTRAMUSCULAR at 22:19

## 2023-07-18 RX ADMIN — CYANOCOBALAMIN TAB 500 MCG 1000 MCG: 500 TAB at 09:40

## 2023-07-18 RX ADMIN — ENOXAPARIN SODIUM 40 MG: 100 INJECTION SUBCUTANEOUS at 06:15

## 2023-07-18 RX ADMIN — INSULIN LISPRO 2 UNITS: 100 INJECTION, SOLUTION INTRAVENOUS; SUBCUTANEOUS at 22:12

## 2023-07-18 RX ADMIN — TRAZODONE HYDROCHLORIDE 100 MG: 100 TABLET ORAL at 22:04

## 2023-07-18 RX ADMIN — Medication 10 ML: at 09:41

## 2023-07-18 RX ADMIN — DIVALPROEX SODIUM 250 MG: 250 TABLET, DELAYED RELEASE ORAL at 09:40

## 2023-07-18 RX ADMIN — INSULIN LISPRO 2 UNITS: 100 INJECTION, SOLUTION INTRAVENOUS; SUBCUTANEOUS at 09:43

## 2023-07-18 NOTE — PLAN OF CARE
Goal Outcome Evaluation:              Outcome Evaluation: AOXself. Very pleasant patient, easily verbally redirected. SW continues at bedside, patient messing with supplies in room and wandering hallways. While patient asleep, sats dropped 80-85%, placed on 2L NC. When patient awake, sats >90%. 6MWT completed, patient passed. No c/o pain. Wound and skin care performed per orders. Awaiting placement.
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
## 2023-07-18 NOTE — SIGNIFICANT NOTE
Wound Eval / Progress Noted    LORI Curry     Patient Name: Alec Medrano  : 1970  MRN: [id number]  Today's Date: 2023                 Admit Date: 2023    Visit Dx:    ICD-10-CM ICD-9-CM   1. Hypothermia due to cold environment  T68.XXXA 991.6   2. Hypernatremia  E87.0 276.0   3. Encephalopathy acute  G93.40 348.30   4. Bradycardia  R00.1 427.89   5. Hypotension, unspecified hypotension type  I95.9 458.9   6. Decreased activities of daily living (ADL)  Z78.9 V49.89   7. Difficulty walking  R26.2 719.7         Hypothermia        Past Medical History:   Diagnosis Date    COPD (chronic obstructive pulmonary disease)     Diabetes mellitus     Hyperlipidemia     Hypertension     Hypotension     Pneumothorax     Pulmonary emboli     Unresponsive episode         Past Surgical History:   Procedure Laterality Date    SPLENECTOMY           Physical Assessment:     23 1150   Wound 23 1015 Left anterior knee Abrasion   Placement Date/Time: 23 1015   Present on Hospital Admission: Yes  Side: Left  Orientation: anterior  Location: knee  Primary Wound Type: Abrasion   Wound Image    Dressing Appearance intact;moist drainage   Closure None   Base moist;pink;scab;dry   Periwound intact;pink   Periwound Temperature warm   Periwound Skin Turgor soft   Edges open   Drainage Characteristics/Odor serous   Drainage Amount small   Care, Wound cleansed with;irrigated with;sterile normal saline   Dressing Care   (dressing peeled and resealed for assessment)   Wound 23 1015 Right anterior greater trochanter Abrasion   Placement Date/Time: 23 1015   Side: Right  Orientation: anterior  Location: greater trochanter  Primary Wound Type: Abrasion   Wound Image    Dressing Appearance intact;moist drainage   Closure None   Base pink;red;moist   Periwound redness;macerated   Periwound Temperature warm   Periwound Skin Turgor soft   Edges open   Wound Length (cm) 3 cm   Wound Width (cm) 2 cm   Wound Depth  (cm) 0.1 cm   Wound Surface Area (cm^2) 6 cm^2   Wound Volume (cm^3) 0.6 cm^3   Drainage Characteristics/Odor serosanguineous   Drainage Amount scant   Care, Wound cleansed with;sterile normal saline   Dressing Care   (dressing peeled and resealed for assessment)   Wound 07/11/23 1455 Left anterior second toe Neuropathic   Placement Date/Time: 07/11/23 1455   Side: Left  Orientation: anterior  Location: second toe  Primary Wound Type: Neuropathic   Dressing Appearance open to air   Closure None   Base scab;dry   Drainage Amount none   Dressing Care open to air          Wound Check / Follow-up:  Patient seen today for wound follow-up. Patient with sitter present in room. Patient is currently up in chair.   He has intact dressings that were just placed. Peeling and resealing for assessment.   Left knee abrasions reflecting some improvement. Pink moist tissue to anterior wound and dry crusting to lateral aspect. Recommending to continue current wound care.   Patient with neuropathic ulcer with stable dry crusting to left second toe. No drainage and no signs of worsening.   Patient also with wound to right hip. Wound with pink to red and moist wound bed. Chloe=wound with some redness and maceration. Cleansed with NS and gauze. Dressing resealed as it was just placed. Recommending to continue current wound care.   Edema noted to BLE. Recommending elevation    Impression: Injuries to left knee and right hip, neuropathic ulcer to left second toe    Short term goals:  Regain skin integrity. Elevation to BLE. Daily dressing changes. Skin protection, moisture prevention, pressure reduction.    Kandice Kwok RN    7/18/2023    14:19 EDT

## 2023-07-18 NOTE — PLAN OF CARE
Goal Outcome Evaluation:  Plan of Care Reviewed With: patient        Progress: no change  Outcome Evaluation: Patient alert to self. Confused. Safety watch at bedside. No complaints of pain. Agitated at times. PRN Seroquel administered as ordered. Wound care completed. No other issues/needs at this time.

## 2023-07-18 NOTE — PLAN OF CARE
Goal Outcome Evaluation:  Plan of Care Reviewed With: patient           Outcome Evaluation: A/OX1 (SELF,) SAFETY WATCH AT BEDSIDE, PT VERY CONFUSED, DISPLAYS INAPPROPRIATE CHILD-LIKE BEHAVIOR. VS WNL, NO NEW COMPLAINTS AT THIS TIME

## 2023-07-19 LAB
ALBUMIN SERPL-MCNC: 3.1 G/DL (ref 3.5–5.2)
ALBUMIN/GLOB SERPL: 0.9 G/DL
ALP SERPL-CCNC: 123 U/L (ref 39–117)
ALT SERPL W P-5'-P-CCNC: 34 U/L (ref 1–41)
ANION GAP SERPL CALCULATED.3IONS-SCNC: 8 MMOL/L (ref 5–15)
ARSENIC BLD-MCNC: 1 UG/L (ref 0–9)
AST SERPL-CCNC: 19 U/L (ref 1–40)
BASOPHILS # BLD AUTO: 0.05 10*3/MM3 (ref 0–0.2)
BASOPHILS NFR BLD AUTO: 0.4 % (ref 0–1.5)
BILIRUB SERPL-MCNC: <0.2 MG/DL (ref 0–1.2)
BUN SERPL-MCNC: 20 MG/DL (ref 6–20)
BUN/CREAT SERPL: 18.3 (ref 7–25)
CALCIUM SPEC-SCNC: 9.6 MG/DL (ref 8.6–10.5)
CHLORIDE SERPL-SCNC: 107 MMOL/L (ref 98–107)
CO2 SERPL-SCNC: 27 MMOL/L (ref 22–29)
CREAT SERPL-MCNC: 1.09 MG/DL (ref 0.76–1.27)
DEPRECATED RDW RBC AUTO: 54.4 FL (ref 37–54)
EGFRCR SERPLBLD CKD-EPI 2021: 81.7 ML/MIN/1.73
EOSINOPHIL # BLD AUTO: 0.33 10*3/MM3 (ref 0–0.4)
EOSINOPHIL NFR BLD AUTO: 2.6 % (ref 0.3–6.2)
ERYTHROCYTE [DISTWIDTH] IN BLOOD BY AUTOMATED COUNT: 16 % (ref 12.3–15.4)
GLOBULIN UR ELPH-MCNC: 3.4 GM/DL
GLUCOSE BLDC GLUCOMTR-MCNC: 134 MG/DL (ref 70–99)
GLUCOSE BLDC GLUCOMTR-MCNC: 209 MG/DL (ref 70–99)
GLUCOSE BLDC GLUCOMTR-MCNC: 233 MG/DL (ref 70–99)
GLUCOSE BLDC GLUCOMTR-MCNC: 278 MG/DL (ref 70–99)
GLUCOSE SERPL-MCNC: 192 MG/DL (ref 65–99)
HCT VFR BLD AUTO: 36.9 % (ref 37.5–51)
HGB BLD-MCNC: 11.9 G/DL (ref 13–17.7)
IMM GRANULOCYTES # BLD AUTO: 0.09 10*3/MM3 (ref 0–0.05)
IMM GRANULOCYTES NFR BLD AUTO: 0.7 % (ref 0–0.5)
LEAD BLDV-MCNC: 3.2 UG/DL (ref 0–3.4)
LYMPHOCYTES # BLD AUTO: 3.06 10*3/MM3 (ref 0.7–3.1)
LYMPHOCYTES NFR BLD AUTO: 24.1 % (ref 19.6–45.3)
MAGNESIUM SERPL-MCNC: 2 MG/DL (ref 1.6–2.6)
MCH RBC QN AUTO: 30.2 PG (ref 26.6–33)
MCHC RBC AUTO-ENTMCNC: 32.2 G/DL (ref 31.5–35.7)
MCV RBC AUTO: 93.7 FL (ref 79–97)
MERCURY BLD-MCNC: <1 UG/L (ref 0–14.9)
MONOCYTES # BLD AUTO: 1.22 10*3/MM3 (ref 0.1–0.9)
MONOCYTES NFR BLD AUTO: 9.6 % (ref 5–12)
NEUTROPHILS NFR BLD AUTO: 62.6 % (ref 42.7–76)
NEUTROPHILS NFR BLD AUTO: 7.95 10*3/MM3 (ref 1.7–7)
NRBC BLD AUTO-RTO: 0 /100 WBC (ref 0–0.2)
PHOSPHATE SERPL-MCNC: 3.8 MG/DL (ref 2.5–4.5)
PLATELET # BLD AUTO: 281 10*3/MM3 (ref 140–450)
PMV BLD AUTO: 10.1 FL (ref 6–12)
POTASSIUM SERPL-SCNC: 4.3 MMOL/L (ref 3.5–5.2)
PROT SERPL-MCNC: 6.5 G/DL (ref 6–8.5)
RBC # BLD AUTO: 3.94 10*6/MM3 (ref 4.14–5.8)
SODIUM SERPL-SCNC: 142 MMOL/L (ref 136–145)
WBC NRBC COR # BLD: 12.7 10*3/MM3 (ref 3.4–10.8)

## 2023-07-19 PROCEDURE — 99232 SBSQ HOSP IP/OBS MODERATE 35: CPT | Performed by: STUDENT IN AN ORGANIZED HEALTH CARE EDUCATION/TRAINING PROGRAM

## 2023-07-19 PROCEDURE — 25010000002 ENOXAPARIN PER 10 MG: Performed by: INTERNAL MEDICINE

## 2023-07-19 PROCEDURE — 63710000001 INSULIN LISPRO (HUMAN) PER 5 UNITS: Performed by: INTERNAL MEDICINE

## 2023-07-19 PROCEDURE — 25010000002 LORAZEPAM PER 2 MG: Performed by: STUDENT IN AN ORGANIZED HEALTH CARE EDUCATION/TRAINING PROGRAM

## 2023-07-19 PROCEDURE — 97110 THERAPEUTIC EXERCISES: CPT

## 2023-07-19 PROCEDURE — 83735 ASSAY OF MAGNESIUM: CPT | Performed by: INTERNAL MEDICINE

## 2023-07-19 PROCEDURE — 84100 ASSAY OF PHOSPHORUS: CPT | Performed by: INTERNAL MEDICINE

## 2023-07-19 PROCEDURE — 85025 COMPLETE CBC W/AUTO DIFF WBC: CPT | Performed by: INTERNAL MEDICINE

## 2023-07-19 PROCEDURE — 80053 COMPREHEN METABOLIC PANEL: CPT | Performed by: INTERNAL MEDICINE

## 2023-07-19 PROCEDURE — 82948 REAGENT STRIP/BLOOD GLUCOSE: CPT

## 2023-07-19 RX ORDER — DIVALPROEX SODIUM 500 MG/1
500 TABLET, DELAYED RELEASE ORAL 2 TIMES DAILY
Status: DISCONTINUED | OUTPATIENT
Start: 2023-07-19 | End: 2023-07-27

## 2023-07-19 RX ORDER — LORAZEPAM 2 MG/ML
0.5 INJECTION INTRAMUSCULAR ONCE AS NEEDED
Status: COMPLETED | OUTPATIENT
Start: 2023-07-19 | End: 2023-07-19

## 2023-07-19 RX ORDER — RISPERIDONE 2 MG/1
4 TABLET ORAL 2 TIMES DAILY
Status: DISCONTINUED | OUTPATIENT
Start: 2023-07-19 | End: 2023-08-07 | Stop reason: HOSPADM

## 2023-07-19 RX ADMIN — SENNOSIDES AND DOCUSATE SODIUM 2 TABLET: 50; 8.6 TABLET ORAL at 08:39

## 2023-07-19 RX ADMIN — INSULIN LISPRO 3 UNITS: 100 INJECTION, SOLUTION INTRAVENOUS; SUBCUTANEOUS at 16:51

## 2023-07-19 RX ADMIN — LORAZEPAM 0.5 MG: 2 INJECTION INTRAMUSCULAR; INTRAVENOUS at 00:27

## 2023-07-19 RX ADMIN — INSULIN LISPRO 4 UNITS: 100 INJECTION, SOLUTION INTRAVENOUS; SUBCUTANEOUS at 08:39

## 2023-07-19 RX ADMIN — RISPERIDONE 3 MG: 3 TABLET ORAL at 08:41

## 2023-07-19 RX ADMIN — INSULIN LISPRO 3 UNITS: 100 INJECTION, SOLUTION INTRAVENOUS; SUBCUTANEOUS at 20:18

## 2023-07-19 RX ADMIN — DIVALPROEX SODIUM 500 MG: 250 TABLET, DELAYED RELEASE ORAL at 20:11

## 2023-07-19 RX ADMIN — Medication 10 ML: at 20:13

## 2023-07-19 RX ADMIN — DIVALPROEX SODIUM 250 MG: 250 TABLET, DELAYED RELEASE ORAL at 08:39

## 2023-07-19 RX ADMIN — TRAZODONE HYDROCHLORIDE 100 MG: 100 TABLET ORAL at 20:12

## 2023-07-19 RX ADMIN — Medication 10 ML: at 08:40

## 2023-07-19 RX ADMIN — CYANOCOBALAMIN TAB 500 MCG 1000 MCG: 500 TAB at 08:39

## 2023-07-19 RX ADMIN — ENOXAPARIN SODIUM 40 MG: 100 INJECTION SUBCUTANEOUS at 08:39

## 2023-07-19 RX ADMIN — RISPERIDONE 4 MG: 2 TABLET ORAL at 20:12

## 2023-07-19 NOTE — THERAPY PROGRESS REPORT/RE-CERT
Patient Name: Alec Medrano  : 1970    MRN: [id number]                              Today's Date: 2023       Admit Date: 2023    Visit Dx:     ICD-10-CM ICD-9-CM   1. Hypothermia due to cold environment  T68.XXXA 991.6   2. Hypernatremia  E87.0 276.0   3. Encephalopathy acute  G93.40 348.30   4. Bradycardia  R00.1 427.89   5. Hypotension, unspecified hypotension type  I95.9 458.9   6. Decreased activities of daily living (ADL)  Z78.9 V49.89   7. Difficulty walking  R26.2 719.7     Patient Active Problem List   Diagnosis    Hypothermia     Past Medical History:   Diagnosis Date    COPD (chronic obstructive pulmonary disease)     Diabetes mellitus     Hyperlipidemia     Hypertension     Hypotension     Pneumothorax     Pulmonary emboli     Unresponsive episode      Past Surgical History:   Procedure Laterality Date    SPLENECTOMY        General Information       Row Name 23 1109          OT Time and Intention    Document Type progress note/recertification  -AV     Mode of Treatment individual therapy;occupational therapy  -AV       Row Name 23 1109          General Information    Patient Profile Reviewed yes  -AV     Prior Level of Function independent:;ADL's;transfer  -AV     Existing Precautions/Restrictions --  safety sitter  -AV     Barriers to Rehab none identified  -AV       Row Name 23 1109          Occupational Profile    Reason for Services/Referral (Occupational Profile) OT re-certification as patient remains hospitalized.  -AV       Row Name 23 1109          Cognition    Orientation Status (Cognition) --  alert, pleasant and cooperative. some repeated direction to follow commands. performed therapeutic exercises with minimal cues/ demonstration.  -AV       Row Name 23 1109          Safety Issues, Functional Mobility    Impairments Affecting Function (Mobility) cognition  -AV               User Key  (r) = Recorded By, (t) = Taken By, (c) = Cosigned By      Initials  Name Provider Type    Leno Jennings OT Occupational Therapist                     Mobility/ADL's       Row Name 07/19/23 1112          Transfers    Comment, (Transfers) supervision only due to hospital setting  -AV       Row Name 07/19/23 1112          Activities of Daily Living    BADL Assessment/Intervention --  Now able to perform ADLs with supervision only due to safety sitter/ hospital setting.  -AV               User Key  (r) = Recorded By, (t) = Taken By, (c) = Cosigned By      Initials Name Provider Type    AV Leno Leal OT Occupational Therapist                   Obj/Interventions       Row Name 07/19/23 1113          Sensory Assessment (Somatosensory)    Sensory Assessment (Somatosensory) UE sensation intact  -AV       Row Name 07/19/23 1113          Vision Assessment/Intervention    Visual Impairment/Limitations WFL  -AV       Row Name 07/19/23 1113          Range of Motion Comprehensive    General Range of Motion bilateral upper extremity ROM WFL  -AV     Comment, General Range of Motion AROM  -AV       Row Name 07/19/23 1113          Strength Comprehensive (MMT)    Comment, General Manual Muscle Testing (MMT) Assessment 4+/5 bilateral upper extremities  -AV       Row Name 07/19/23 1113          Shoulder (Therapeutic Exercise)    Shoulder Strengthening (Therapeutic Exercise) bilateral;flexion;horizontal aBduction/aDduction;sitting;1 lb free weight;20 repititions  -AV       Row Name 07/19/23 1113          Elbow/Forearm (Therapeutic Exercise)    Elbow/Forearm Strengthening (Therapeutic Exercise) bilateral;flexion;extension;supination;pronation;sitting;1 lb free weight;20 repititions  -AV       Row Name 07/19/23 1113          Motor Skills    Coordination --  right dominant  -AV     Functional Endurance fair plus/good  -AV       Row Name 07/19/23 1113          Balance    Balance Assessment sitting dynamic balance  -AV     Dynamic Sitting Balance independent  -AV     Comment, Balance supervision due  to hospital setting/ safety sitter  -AV               User Key  (r) = Recorded By, (t) = Taken By, (c) = Cosigned By      Initials Name Provider Type    AV Leno Leal, ABDOULAYE Occupational Therapist                   Goals/Plan       Row Name 07/19/23 1116          Transfer Goal 1 (OT)    Activity/Assistive Device (Transfer Goal 1, OT) transfers, all;walker, rolling  -AV     Galt Level/Cues Needed (Transfer Goal 1, OT) modified independence  -AV     Time Frame (Transfer Goal 1, OT) long term goal (LTG);10 days  -AV     Progress/Outcome (Transfer Goal 1, OT) goal met  -AV       Row Name 07/19/23 1116          Bathing Goal 1 (OT)    Activity/Device (Bathing Goal 1, OT) bathing skills, all  -AV     Galt Level/Cues Needed (Bathing Goal 1, OT) modified independence  -AV     Time Frame (Bathing Goal 1, OT) long term goal (LTG);10 days  -AV     Progress/Outcomes (Bathing Goal 1, OT) goal met  -AV       Row Name 07/19/23 1116          Dressing Goal 1 (OT)    Activity/Device (Dressing Goal 1, OT) dressing skills, all  -AV     Galt/Cues Needed (Dressing Goal 1, OT) modified independence  -AV     Time Frame (Dressing Goal 1, OT) long term goal (LTG);10 days  -AV     Progress/Outcome (Dressing Goal 1, OT) goal met  -AV       Row Name 07/19/23 1116          Toileting Goal 1 (OT)    Activity/Device (Toileting Goal 1, OT) toileting skills, all  -AV     Galt Level/Cues Needed (Toileting Goal 1, OT) modified independence  -AV     Time Frame (Toileting Goal 1, OT) long term goal (LTG);10 days  -AV     Progress/Outcome (Toileting Goal 1, OT) goal met  -AV       Row Name 07/19/23 1116          Grooming Goal 1 (OT)    Activity/Device (Grooming Goal 1, OT) grooming skills, all  -AV     Galt (Grooming Goal 1, OT) modified independence  -AV     Time Frame (Grooming Goal 1, OT) long term goal (LTG);10 days  -AV     Progress/Outcome (Grooming Goal 1, OT) goal met  -AV       Row Name 07/19/23 1116           Strength Goal 1 (OT)    Strength Goal 1 (OT) Pt will increase BUE strength to 4+/5 muscle strength for increased UE strength required for ADL transfers and task completion  -AV     Time Frame (Strength Goal 1, OT) long term goal (LTG);10 days  -AV       Row Name 07/19/23 1116          Problem Specific Goal 1 (OT)    Problem Specific Goal 1 (OT) Patient will demonstrate fair plus activity tolerance in preperation for independent ADL routine completion at time of discharge  -AV     Time Frame (Problem Specific Goal 1, OT) long term goal (LTG);10 days  -AV     Progress/Outcome (Problem Specific Goal 1, OT) goal met  -AV               User Key  (r) = Recorded By, (t) = Taken By, (c) = Cosigned By      Initials Name Provider Type    Leno Jennings OT Occupational Therapist                   Clinical Impression       Row Name 07/19/23 1115          Pain Assessment    Pretreatment Pain Rating 0/10 - no pain  -AV     Posttreatment Pain Rating 0/10 - no pain  -AV       Row Name 07/19/23 1115          Plan of Care Review    Progress improving  -AV     Outcome Evaluation Performed upper extremity therapeutic exercises with 1# resistance in supported sitting. He is now able to perform all ADL/transfers independently, requiring supervision only due to safety sitter/ hospitalization. Will dc OT services.  -AV       Row Name 07/19/23 1115          Vital Signs    O2 Delivery Pre Treatment room air  -AV     O2 Delivery Intra Treatment room air  -AV     O2 Delivery Post Treatment room air  -AV               User Key  (r) = Recorded By, (t) = Taken By, (c) = Cosigned By      Initials Name Provider Type    Leno Jennings OT Occupational Therapist                   Outcome Measures       Row Name 07/19/23 1117          How much help from another is currently needed...    Putting on and taking off regular lower body clothing? 4  -AV     Bathing (including washing, rinsing, and drying) 4  -AV     Toileting (which includes using  toilet bed pan or urinal) 4  -AV     Putting on and taking off regular upper body clothing 4  -AV     Taking care of personal grooming (such as brushing teeth) 4  -AV     Eating meals 4  -AV     AM-PAC 6 Clicks Score (OT) 24  -AV       Row Name 07/19/23 1117          Optimal Instrument    Bending/Stooping 1  -AV     Standing 1  -AV     Reaching 1  -AV               User Key  (r) = Recorded By, (t) = Taken By, (c) = Cosigned By      Initials Name Provider Type    Leno Jennings OT Occupational Therapist                      OT Recommendation and Plan     Plan of Care Review  Progress: improving  Outcome Evaluation: Performed upper extremity therapeutic exercises with 1# resistance in supported sitting. He is now able to perform all ADL/transfers independently, requiring supervision only due to safety sitter/ hospitalization. Will dc OT services.     Time Calculation:         Time Calculation- OT       Row Name 07/19/23 1118             Time Calculation- OT    OT Received On 07/19/23  -AV         Timed Charges    07002 - OT Therapeutic Exercise Minutes 25  -AV         Total Minutes    Timed Charges Total Minutes 25  -AV       Total Minutes 25  -AV                User Key  (r) = Recorded By, (t) = Taken By, (c) = Cosigned By      Initials Name Provider Type    Leno Jennings OT Occupational Therapist                  Therapy Charges for Today       Code Description Service Date Service Provider Modifiers Qty    [id number]  OT THER PROC EA 15 MIN 7/19/2023 Leno Leal OT GO 2                 Leno Leal OT  7/19/2023

## 2023-07-19 NOTE — PROGRESS NOTES
[facility]     Psychiatric Progress Note    Patient Name: Alec Medrano  : 1970  MRN: [id number]  Primary Care Physician:  Provider, No Known  Date of admission: 2023    Subjective   Subjective     Patient seen and chart reviewed, discussed with staff.    Chief Complaint: Psychosis      HPI:     Patient sitting up in chair eating lunch.  He has a very good appetite.  He is calm and cooperative the interview.  Patient states he remembers me but thinks I am an oral surgeon, when reoriented to the psychiatrist he remains pleasant and continues in interview.  He is oriented to year, month, and place but not day a week.  Patient is more engaging than he was when my last evaluation.  Continues to have some thought disorder.  Patient reports that he is feeling well.  Reports that he slept well.    He continues to have some agitation and has received haloperidol in the early morning hours of today as well as lorazepam.  He is also on quetiapine on an as-needed basis received a dose yesterday.    Sister is in the room and states that he has been decompensating since April.  She reports his change in mental status is being somewhat abrupt.  He does have a history of alcohol misuse according to sister and family, and at that they appeared that he was drinking again, but they had no evidence that he was.  He has been sober for 4 years and doing very well.  Unclear if the patient had been drinking, but if he had he has been sober for many weeks and has not fully restored to his previous mental state.  May have a lingering delirium, but it is unclear.  Patient continues to have some delusional thinking, but not nearly as prevalent as previously.  He is no longer continually making statements of him being full of great fame.    We will increase Depakote as well as Risperdal to help with further mood stabilization      Objective   Objective     Vitals:   Temp:  [97.3 øF (36.3 øC)-98.1 øF (36.7 øC)] 97.5 øF (36.4  "øC)  Heart Rate:  [71-92] 84  Resp:  [18-20] 18  BP: (139-148)/(73-88) 141/84          Mental Status Exam:      Appearance:   Up in chair, calm, cooperative  Reliability:   Limited  Eye Contact:   Fair  Concentration/Focus:    Mostly attentive, but sometimes need to redirect him in order to regain his attention and focus  Behaviors:    No acute agitation  Memory :    Fair, not oriented to day a week, continues to have some deficits  Speech:    Normal rate and volume  Language:   No cursing  Mood :    \"Good\"  Affect:    Slightly expansive, but improved  Thought process:    Tangential, simple  Thought Content:    Denies suicidal or homicidal ideation, no obvious hallucinations  Insight:   Limited  Judgement:    Continues to require extra medications for restlessness and agitation      Result Review    Result Review:  I have personally reviewed the results from the time of this admission to 7/19/2023 13:44 EDT and agree with these findings:  []  Laboratory  []  Microbiology  []  Radiology  []  EKG/Telemetry   []  Cardiology/Vascular   []  Pathology  []  Old records  []  Other:  Most notable findings include:     Lab Results (last 24 hours)       Procedure Component Value Units Date/Time    POC Glucose Once [[id number]]  (Abnormal) Collected: 07/19/23 1246    Specimen: Blood Updated: 07/19/23 1251     Glucose 134 mg/dL      Comment: Serial Number: [id number]:  [id number]       Heavy Metals, Blood [[id number]] Collected: 07/11/23 0333    Specimen: Blood Updated: 07/19/23 1008     Lead 3.2 ug/dL      Comment: Testing performed by Inductively coupled plasma/Mass Spectrometry.                            Environmental Exposure:                             WHO Recommendation     <5.0                            Occupational Exposure:                             OSHA Lead Std          40.0                             [employer]                    30.0                                  Detection Limit =  1.0        Arsenic 1 ug/L  "     Comment:                                 Detection Limit = 1        Mercury <1.0 ug/L      Comment:                         Environmental Exposure:  <15.0                          Occupational Exposure:                           [employer] - Inorganic Mercury: 15.0                                  Detection Limit =  1.0       Narrative:      Test(s) 007632-Lead, Blood; 007245-Arsenic, Blood; 011437-  Mercury, Blood  was developed and its performance characteristics determined  by [facility]. It has not been cleared or approved by the Food  and Drug Administration.  Performed at:  [address]  [id number]  : Car Whiteside MD, Phone:  [phone]    POC Glucose Once [[id number]]  (Abnormal) Collected: 07/19/23 0827    Specimen: Blood Updated: 07/19/23 0829     Glucose 278 mg/dL      Comment: Serial Number: [id number]:  [id number]       Magnesium [[id number]]  (Normal) Collected: 07/19/23 0500    Specimen: Blood Updated: 07/19/23 0657     Magnesium 2.0 mg/dL     Comprehensive Metabolic Panel [[id number]]  (Abnormal) Collected: 07/19/23 0500    Specimen: Blood Updated: 07/19/23 0657     Glucose 192 mg/dL      BUN 20 mg/dL      Creatinine 1.09 mg/dL      Sodium 142 mmol/L      Potassium 4.3 mmol/L      Chloride 107 mmol/L      CO2 27.0 mmol/L      Calcium 9.6 mg/dL      Total Protein 6.5 g/dL      Albumin 3.1 g/dL      ALT (SGPT) 34 U/L      AST (SGOT) 19 U/L      Alkaline Phosphatase 123 U/L      Total Bilirubin <0.2 mg/dL      Globulin 3.4 gm/dL      A/G Ratio 0.9 g/dL      BUN/Creatinine Ratio 18.3     Anion Gap 8.0 mmol/L      eGFR 81.7 mL/min/1.73     Narrative:      GFR Normal >60  Chronic Kidney Disease <60  Kidney Failure <15      Phosphorus [[id number]]  (Normal) Collected: 07/19/23 0500    Specimen: Blood Updated: 07/19/23 0657     Phosphorus 3.8 mg/dL     CBC & Differential [[id number]]  (Abnormal) Collected: 07/19/23 0500    Specimen: Blood Updated: 07/19/23  0539    Narrative:      The following orders were created for panel order CBC & Differential.  Procedure                               Abnormality         Status                     ---------                               -----------         ------                     CBC Auto Differential[[id number]]        Abnormal            Final result                 Please view results for these tests on the individual orders.    CBC Auto Differential [[id number]]  (Abnormal) Collected: 07/19/23 0500    Specimen: Blood Updated: 07/19/23 0539     WBC 12.70 10*3/mm3      RBC 3.94 10*6/mm3      Hemoglobin 11.9 g/dL      Hematocrit 36.9 %      MCV 93.7 fL      MCH 30.2 pg      MCHC 32.2 g/dL      RDW 16.0 %      RDW-SD 54.4 fl      MPV 10.1 fL      Platelets 281 10*3/mm3      Neutrophil % 62.6 %      Lymphocyte % 24.1 %      Monocyte % 9.6 %      Eosinophil % 2.6 %      Basophil % 0.4 %      Immature Grans % 0.7 %      Neutrophils, Absolute 7.95 10*3/mm3      Lymphocytes, Absolute 3.06 10*3/mm3      Monocytes, Absolute 1.22 10*3/mm3      Eosinophils, Absolute 0.33 10*3/mm3      Basophils, Absolute 0.05 10*3/mm3      Immature Grans, Absolute 0.09 10*3/mm3      nRBC 0.0 /100 WBC     POC Glucose Once [[id number]]  (Abnormal) Collected: 07/18/23 2207    Specimen: Blood Updated: 07/18/23 2208     Glucose 163 mg/dL      Comment: Serial Number: [id number]:  [id number]       POC Glucose Once [[id number]]  (Abnormal) Collected: 07/18/23 1647    Specimen: Blood Updated: 07/18/23 1649     Glucose 171 mg/dL      Comment: Serial Number: [id number]:  [id number]                   Medications:   cyanocobalamin, 1,000 mcg, Intramuscular, Q28 Days  divalproex, 500 mg, Oral, BID  enoxaparin, 40 mg, Subcutaneous, Q24H  insulin lispro, 2-7 Units, Subcutaneous, 4x Daily AC & at Bedtime  lidocaine, 2 patch, Transdermal, Q24H  mineral oil-hydrophilic petrolatum, 1 application , Topical, 2 times per day  multivitamin with minerals, 1 tablet,  Oral, Daily  risperiDONE, 4 mg, Oral, BID  senna-docusate sodium, 2 tablet, Oral, BID  sodium chloride, 10 mL, Intravenous, Q12H  traZODone, 100 mg, Oral, Nightly  vitamin B-12, 1,000 mcg, Oral, Daily          Assessment / Plan       Active Hospital Problems:  Active Hospital Problems    Diagnosis     **Hypothermia        Plan:     Increase Risperdal to 4 mg twice daily  Increase Depakote to 500 mg twice daily  We will follow make treatment recommendations as indicated      Disposition:  I expect patient to be discharged per primary team's plan.    Part of this note may be an electronic transcription/translation of spoken language to printed text using the Dragon dictation system.         Electronically signed by Chris Green MD, 07/19/23, 1:44 PM EDT.
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
## 2023-07-19 NOTE — PLAN OF CARE
Goal Outcome Evaluation:  Plan of Care Reviewed With: patient        Progress: improving  Outcome Evaluation: Patient alert and oriented x3 with intermittent confusion. Blood glucose monitored. Patient remains in safety watch. No new issues at this time.

## 2023-07-20 LAB
ALBUMIN SERPL-MCNC: 3 G/DL (ref 3.5–5.2)
ALBUMIN/GLOB SERPL: 0.8 G/DL
ALP SERPL-CCNC: 131 U/L (ref 39–117)
ALT SERPL W P-5'-P-CCNC: 29 U/L (ref 1–41)
ANION GAP SERPL CALCULATED.3IONS-SCNC: 8.1 MMOL/L (ref 5–15)
AST SERPL-CCNC: 15 U/L (ref 1–40)
BASOPHILS # BLD AUTO: 0.04 10*3/MM3 (ref 0–0.2)
BASOPHILS NFR BLD AUTO: 0.3 % (ref 0–1.5)
BILIRUB SERPL-MCNC: <0.2 MG/DL (ref 0–1.2)
BUN SERPL-MCNC: 21 MG/DL (ref 6–20)
BUN/CREAT SERPL: 16.9 (ref 7–25)
CALCIUM SPEC-SCNC: 9.2 MG/DL (ref 8.6–10.5)
CHLORIDE SERPL-SCNC: 104 MMOL/L (ref 98–107)
CO2 SERPL-SCNC: 25.9 MMOL/L (ref 22–29)
CREAT SERPL-MCNC: 1.24 MG/DL (ref 0.76–1.27)
DEPRECATED RDW RBC AUTO: 55.5 FL (ref 37–54)
EGFRCR SERPLBLD CKD-EPI 2021: 70 ML/MIN/1.73
EOSINOPHIL # BLD AUTO: 0.25 10*3/MM3 (ref 0–0.4)
EOSINOPHIL NFR BLD AUTO: 2.1 % (ref 0.3–6.2)
ERYTHROCYTE [DISTWIDTH] IN BLOOD BY AUTOMATED COUNT: 15.9 % (ref 12.3–15.4)
GLOBULIN UR ELPH-MCNC: 3.7 GM/DL
GLUCOSE BLDC GLUCOMTR-MCNC: 159 MG/DL (ref 70–99)
GLUCOSE BLDC GLUCOMTR-MCNC: 190 MG/DL (ref 70–99)
GLUCOSE BLDC GLUCOMTR-MCNC: 285 MG/DL (ref 70–99)
GLUCOSE BLDC GLUCOMTR-MCNC: 294 MG/DL (ref 70–99)
GLUCOSE SERPL-MCNC: 260 MG/DL (ref 65–99)
HCT VFR BLD AUTO: 38.6 % (ref 37.5–51)
HGB BLD-MCNC: 12.4 G/DL (ref 13–17.7)
IMM GRANULOCYTES # BLD AUTO: 0.09 10*3/MM3 (ref 0–0.05)
IMM GRANULOCYTES NFR BLD AUTO: 0.8 % (ref 0–0.5)
LYMPHOCYTES # BLD AUTO: 2.52 10*3/MM3 (ref 0.7–3.1)
LYMPHOCYTES NFR BLD AUTO: 21.6 % (ref 19.6–45.3)
MAGNESIUM SERPL-MCNC: 2 MG/DL (ref 1.6–2.6)
MCH RBC QN AUTO: 30.7 PG (ref 26.6–33)
MCHC RBC AUTO-ENTMCNC: 32.1 G/DL (ref 31.5–35.7)
MCV RBC AUTO: 95.5 FL (ref 79–97)
MONOCYTES # BLD AUTO: 0.9 10*3/MM3 (ref 0.1–0.9)
MONOCYTES NFR BLD AUTO: 7.7 % (ref 5–12)
NEUTROPHILS NFR BLD AUTO: 67.5 % (ref 42.7–76)
NEUTROPHILS NFR BLD AUTO: 7.84 10*3/MM3 (ref 1.7–7)
NRBC BLD AUTO-RTO: 0 /100 WBC (ref 0–0.2)
PHOSPHATE SERPL-MCNC: 3.1 MG/DL (ref 2.5–4.5)
PLATELET # BLD AUTO: 302 10*3/MM3 (ref 140–450)
PMV BLD AUTO: 9.7 FL (ref 6–12)
POTASSIUM SERPL-SCNC: 4.3 MMOL/L (ref 3.5–5.2)
PROT SERPL-MCNC: 6.7 G/DL (ref 6–8.5)
RBC # BLD AUTO: 4.04 10*6/MM3 (ref 4.14–5.8)
SODIUM SERPL-SCNC: 138 MMOL/L (ref 136–145)
WBC NRBC COR # BLD: 11.64 10*3/MM3 (ref 3.4–10.8)

## 2023-07-20 PROCEDURE — 82948 REAGENT STRIP/BLOOD GLUCOSE: CPT

## 2023-07-20 PROCEDURE — 85025 COMPLETE CBC W/AUTO DIFF WBC: CPT | Performed by: INTERNAL MEDICINE

## 2023-07-20 PROCEDURE — 25010000002 ENOXAPARIN PER 10 MG: Performed by: INTERNAL MEDICINE

## 2023-07-20 PROCEDURE — 84100 ASSAY OF PHOSPHORUS: CPT | Performed by: INTERNAL MEDICINE

## 2023-07-20 PROCEDURE — 36415 COLL VENOUS BLD VENIPUNCTURE: CPT | Performed by: INTERNAL MEDICINE

## 2023-07-20 PROCEDURE — 80053 COMPREHEN METABOLIC PANEL: CPT | Performed by: INTERNAL MEDICINE

## 2023-07-20 PROCEDURE — 63710000001 INSULIN LISPRO (HUMAN) PER 5 UNITS: Performed by: INTERNAL MEDICINE

## 2023-07-20 PROCEDURE — 99232 SBSQ HOSP IP/OBS MODERATE 35: CPT | Performed by: STUDENT IN AN ORGANIZED HEALTH CARE EDUCATION/TRAINING PROGRAM

## 2023-07-20 PROCEDURE — 83735 ASSAY OF MAGNESIUM: CPT | Performed by: INTERNAL MEDICINE

## 2023-07-20 RX ADMIN — SENNOSIDES AND DOCUSATE SODIUM 2 TABLET: 50; 8.6 TABLET ORAL at 08:55

## 2023-07-20 RX ADMIN — INSULIN LISPRO 4 UNITS: 100 INJECTION, SOLUTION INTRAVENOUS; SUBCUTANEOUS at 22:19

## 2023-07-20 RX ADMIN — CYANOCOBALAMIN TAB 500 MCG 1000 MCG: 500 TAB at 08:55

## 2023-07-20 RX ADMIN — INSULIN LISPRO 4 UNITS: 100 INJECTION, SOLUTION INTRAVENOUS; SUBCUTANEOUS at 12:32

## 2023-07-20 RX ADMIN — Medication 10 ML: at 08:56

## 2023-07-20 RX ADMIN — DIVALPROEX SODIUM 500 MG: 250 TABLET, DELAYED RELEASE ORAL at 22:18

## 2023-07-20 RX ADMIN — TRAZODONE HYDROCHLORIDE 100 MG: 100 TABLET ORAL at 22:18

## 2023-07-20 RX ADMIN — DIVALPROEX SODIUM 500 MG: 250 TABLET, DELAYED RELEASE ORAL at 08:55

## 2023-07-20 RX ADMIN — MULTIPLE VITAMINS W/ MINERALS TAB 1 TABLET: TAB at 08:55

## 2023-07-20 RX ADMIN — ENOXAPARIN SODIUM 40 MG: 100 INJECTION SUBCUTANEOUS at 08:55

## 2023-07-20 RX ADMIN — RISPERIDONE 4 MG: 2 TABLET ORAL at 22:18

## 2023-07-20 RX ADMIN — INSULIN LISPRO 2 UNITS: 100 INJECTION, SOLUTION INTRAVENOUS; SUBCUTANEOUS at 17:23

## 2023-07-20 RX ADMIN — WHITE PETROLATUM 1 APPLICATION: 1.75 OINTMENT TOPICAL at 22:19

## 2023-07-20 RX ADMIN — INSULIN LISPRO 2 UNITS: 100 INJECTION, SOLUTION INTRAVENOUS; SUBCUTANEOUS at 08:56

## 2023-07-20 RX ADMIN — WHITE PETROLATUM 1 APPLICATION: 1.75 OINTMENT TOPICAL at 09:03

## 2023-07-20 RX ADMIN — Medication 10 ML: at 22:20

## 2023-07-20 RX ADMIN — RISPERIDONE 4 MG: 2 TABLET ORAL at 08:55
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
## 2023-07-20 NOTE — PLAN OF CARE
Goal Outcome Evaluation:  Plan of Care Reviewed With: patient        Progress: improving  Outcome Evaluation: Patient remains alert and oriented x3 with intermittent confusion. Blood glucose monitored. Patient remains in safety watch. No new issues at this time.

## 2023-07-21 LAB
ALBUMIN SERPL-MCNC: 3.4 G/DL (ref 3.5–5.2)
ALBUMIN/GLOB SERPL: 0.9 G/DL
ALP SERPL-CCNC: 143 U/L (ref 39–117)
ALT SERPL W P-5'-P-CCNC: 28 U/L (ref 1–41)
ANION GAP SERPL CALCULATED.3IONS-SCNC: 9 MMOL/L (ref 5–15)
AST SERPL-CCNC: 15 U/L (ref 1–40)
BASOPHILS # BLD AUTO: 0.06 10*3/MM3 (ref 0–0.2)
BASOPHILS NFR BLD AUTO: 0.5 % (ref 0–1.5)
BILIRUB SERPL-MCNC: 0.2 MG/DL (ref 0–1.2)
BUN SERPL-MCNC: 18 MG/DL (ref 6–20)
BUN/CREAT SERPL: 15 (ref 7–25)
CALCIUM SPEC-SCNC: 9.6 MG/DL (ref 8.6–10.5)
CHLORIDE SERPL-SCNC: 102 MMOL/L (ref 98–107)
CO2 SERPL-SCNC: 25 MMOL/L (ref 22–29)
CREAT SERPL-MCNC: 1.2 MG/DL (ref 0.76–1.27)
DEPRECATED RDW RBC AUTO: 53.3 FL (ref 37–54)
EGFRCR SERPLBLD CKD-EPI 2021: 72.8 ML/MIN/1.73
EOSINOPHIL # BLD AUTO: 0.25 10*3/MM3 (ref 0–0.4)
EOSINOPHIL NFR BLD AUTO: 1.9 % (ref 0.3–6.2)
ERYTHROCYTE [DISTWIDTH] IN BLOOD BY AUTOMATED COUNT: 15.8 % (ref 12.3–15.4)
GLOBULIN UR ELPH-MCNC: 3.9 GM/DL
GLUCOSE BLDC GLUCOMTR-MCNC: 127 MG/DL (ref 70–99)
GLUCOSE BLDC GLUCOMTR-MCNC: 177 MG/DL (ref 70–99)
GLUCOSE BLDC GLUCOMTR-MCNC: 245 MG/DL (ref 70–99)
GLUCOSE BLDC GLUCOMTR-MCNC: 265 MG/DL (ref 70–99)
GLUCOSE BLDC GLUCOMTR-MCNC: 274 MG/DL (ref 70–99)
GLUCOSE SERPL-MCNC: 278 MG/DL (ref 65–99)
HCT VFR BLD AUTO: 39.9 % (ref 37.5–51)
HGB BLD-MCNC: 12.9 G/DL (ref 13–17.7)
IMM GRANULOCYTES # BLD AUTO: 0.11 10*3/MM3 (ref 0–0.05)
IMM GRANULOCYTES NFR BLD AUTO: 0.8 % (ref 0–0.5)
LYMPHOCYTES # BLD AUTO: 2.54 10*3/MM3 (ref 0.7–3.1)
LYMPHOCYTES NFR BLD AUTO: 19.2 % (ref 19.6–45.3)
MAGNESIUM SERPL-MCNC: 1.8 MG/DL (ref 1.6–2.6)
MCH RBC QN AUTO: 30.2 PG (ref 26.6–33)
MCHC RBC AUTO-ENTMCNC: 32.3 G/DL (ref 31.5–35.7)
MCV RBC AUTO: 93.4 FL (ref 79–97)
MONOCYTES # BLD AUTO: 1.36 10*3/MM3 (ref 0.1–0.9)
MONOCYTES NFR BLD AUTO: 10.3 % (ref 5–12)
NEUTROPHILS NFR BLD AUTO: 67.3 % (ref 42.7–76)
NEUTROPHILS NFR BLD AUTO: 8.92 10*3/MM3 (ref 1.7–7)
NRBC BLD AUTO-RTO: 0 /100 WBC (ref 0–0.2)
PHOSPHATE SERPL-MCNC: 3.3 MG/DL (ref 2.5–4.5)
PLATELET # BLD AUTO: 357 10*3/MM3 (ref 140–450)
PMV BLD AUTO: 9.8 FL (ref 6–12)
POTASSIUM SERPL-SCNC: 4.7 MMOL/L (ref 3.5–5.2)
PROT SERPL-MCNC: 7.3 G/DL (ref 6–8.5)
RBC # BLD AUTO: 4.27 10*6/MM3 (ref 4.14–5.8)
SODIUM SERPL-SCNC: 136 MMOL/L (ref 136–145)
WBC NRBC COR # BLD: 13.24 10*3/MM3 (ref 3.4–10.8)

## 2023-07-21 PROCEDURE — 83735 ASSAY OF MAGNESIUM: CPT | Performed by: INTERNAL MEDICINE

## 2023-07-21 PROCEDURE — 80053 COMPREHEN METABOLIC PANEL: CPT | Performed by: INTERNAL MEDICINE

## 2023-07-21 PROCEDURE — 85025 COMPLETE CBC W/AUTO DIFF WBC: CPT | Performed by: INTERNAL MEDICINE

## 2023-07-21 PROCEDURE — 84100 ASSAY OF PHOSPHORUS: CPT | Performed by: INTERNAL MEDICINE

## 2023-07-21 PROCEDURE — 63710000001 INSULIN LISPRO (HUMAN) PER 5 UNITS: Performed by: STUDENT IN AN ORGANIZED HEALTH CARE EDUCATION/TRAINING PROGRAM

## 2023-07-21 PROCEDURE — 25010000002 FUROSEMIDE PER 20 MG: Performed by: STUDENT IN AN ORGANIZED HEALTH CARE EDUCATION/TRAINING PROGRAM

## 2023-07-21 PROCEDURE — 99232 SBSQ HOSP IP/OBS MODERATE 35: CPT | Performed by: STUDENT IN AN ORGANIZED HEALTH CARE EDUCATION/TRAINING PROGRAM

## 2023-07-21 PROCEDURE — 82948 REAGENT STRIP/BLOOD GLUCOSE: CPT

## 2023-07-21 PROCEDURE — 25010000002 ENOXAPARIN PER 10 MG: Performed by: INTERNAL MEDICINE

## 2023-07-21 RX ORDER — INSULIN LISPRO 100 [IU]/ML
2-9 INJECTION, SOLUTION INTRAVENOUS; SUBCUTANEOUS
Status: DISCONTINUED | OUTPATIENT
Start: 2023-07-21 | End: 2023-07-22

## 2023-07-21 RX ORDER — FUROSEMIDE 10 MG/ML
40 INJECTION INTRAMUSCULAR; INTRAVENOUS DAILY
Status: DISCONTINUED | OUTPATIENT
Start: 2023-07-21 | End: 2023-07-24

## 2023-07-21 RX ADMIN — SENNOSIDES AND DOCUSATE SODIUM 2 TABLET: 50; 8.6 TABLET ORAL at 09:10

## 2023-07-21 RX ADMIN — CYANOCOBALAMIN TAB 500 MCG 1000 MCG: 500 TAB at 09:11

## 2023-07-21 RX ADMIN — TRAZODONE HYDROCHLORIDE 100 MG: 100 TABLET ORAL at 22:49

## 2023-07-21 RX ADMIN — INSULIN LISPRO 6 UNITS: 100 INJECTION, SOLUTION INTRAVENOUS; SUBCUTANEOUS at 22:50

## 2023-07-21 RX ADMIN — RISPERIDONE 4 MG: 2 TABLET ORAL at 22:48

## 2023-07-21 RX ADMIN — Medication 10 ML: at 09:12

## 2023-07-21 RX ADMIN — FUROSEMIDE 40 MG: 10 INJECTION, SOLUTION INTRAMUSCULAR; INTRAVENOUS at 14:12

## 2023-07-21 RX ADMIN — WHITE PETROLATUM 1 APPLICATION: 1.75 OINTMENT TOPICAL at 11:59

## 2023-07-21 RX ADMIN — WHITE PETROLATUM 1 APPLICATION: 1.75 OINTMENT TOPICAL at 23:04

## 2023-07-21 RX ADMIN — Medication 10 ML: at 22:50

## 2023-07-21 RX ADMIN — ENOXAPARIN SODIUM 40 MG: 100 INJECTION SUBCUTANEOUS at 09:10

## 2023-07-21 RX ADMIN — DIVALPROEX SODIUM 500 MG: 250 TABLET, DELAYED RELEASE ORAL at 09:11

## 2023-07-21 RX ADMIN — INSULIN LISPRO 4 UNITS: 100 INJECTION, SOLUTION INTRAVENOUS; SUBCUTANEOUS at 11:59

## 2023-07-21 RX ADMIN — DIVALPROEX SODIUM 500 MG: 250 TABLET, DELAYED RELEASE ORAL at 22:49

## 2023-07-21 RX ADMIN — RISPERIDONE 4 MG: 2 TABLET ORAL at 09:10

## 2023-07-21 RX ADMIN — MULTIPLE VITAMINS W/ MINERALS TAB 1 TABLET: TAB at 09:10

## 2023-07-21 RX ADMIN — SENNOSIDES AND DOCUSATE SODIUM 2 TABLET: 50; 8.6 TABLET ORAL at 22:49
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
## 2023-07-21 NOTE — PROGRESS NOTES
" [facility]     Psychiatric Progress Note    Patient Name: Alec Medrano  : 1970  MRN: [id number]  Primary Care Physician:  Provider, No Known  Date of admission: 2023    Subjective   Subjective     Patient seen and chart reviewed, discussed with staff.    Chief Complaint: Psychosis      HPI:     Patient today is sitting in the lobby [facility] for [facility] a table drinking coffee looking at the window.  He is pleasant and engaging.  Continues to be tangential and somewhat disorganized in his thoughts.  Speech is rambling.  Has a big grin on his face and is pleasant.  He reports has been sleeping well.  Says that he remembers being thinks in a brain surgeon.  He denies any side effects from his medication.    The patient about his previous alcohol use and if he has had a recent relapse and he denies.  However is somewhat inattentive and not sure that he is fully in tune with the interview.  Reports his mood is good.  There is no psychomotor restlessness or agitation.      Objective   Objective     Vitals:   Temp:  [97.5 øF (36.4 øC)-99.7 øF (37.6 øC)] 98.1 øF (36.7 øC)  Heart Rate:  [] 80  Resp:  [18] 18  BP: (135-157)/(67-89) 139/75          Mental Status Exam:      Appearance:   Well-developed, well-nourished, sitting comfortably quietly at a table  Reliability:   Poor  Eye Contact:   Good  Concentration/Focus:    Attentive to the interview but often does not answer questions asked in conversation gets derailed  Behaviors:    No restlessness or agitation  Memory :    Impaired  Speech:    Normal rate and volume  Language:   Appropriate, not relevant at times  Mood :    \"Good\"  Affect:    Congruent with stated mood, slightly expansive  Thought process:    Tangential, appears to have some underlying delusions, bizarre  Thought Content:    Denies suicidal homicidal ideation, denies hallucination does not appear to be actively hallucinating  Insight:   Limited  Judgement:    Improved, was not required " any extra medications for behavioral disturbance in 2 days      Result Review    Result Review:  I have personally reviewed the results from the time of this admission to 7/21/2023 12:02 EDT and agree with these findings:  []  Laboratory  []  Microbiology  []  Radiology  []  EKG/Telemetry   []  Cardiology/Vascular   []  Pathology  []  Old records  []  Other:  Most notable findings include:     Lab Results (last 24 hours)       Procedure Component Value Units Date/Time    POC Glucose Once [[id number]]  (Abnormal) Collected: 07/21/23 0853    Specimen: Blood Updated: 07/21/23 0855     Glucose 177 mg/dL      Comment: Serial Number: [id number]:  [id number]       Magnesium [[id number]]  (Normal) Collected: 07/21/23 0432    Specimen: Blood Updated: 07/21/23 0532     Magnesium 1.8 mg/dL     Comprehensive Metabolic Panel [[id number]]  (Abnormal) Collected: 07/21/23 0432    Specimen: Blood Updated: 07/21/23 0532     Glucose 278 mg/dL      BUN 18 mg/dL      Creatinine 1.20 mg/dL      Sodium 136 mmol/L      Potassium 4.7 mmol/L      Chloride 102 mmol/L      CO2 25.0 mmol/L      Calcium 9.6 mg/dL      Total Protein 7.3 g/dL      Albumin 3.4 g/dL      ALT (SGPT) 28 U/L      AST (SGOT) 15 U/L      Alkaline Phosphatase 143 U/L      Total Bilirubin 0.2 mg/dL      Globulin 3.9 gm/dL      A/G Ratio 0.9 g/dL      BUN/Creatinine Ratio 15.0     Anion Gap 9.0 mmol/L      eGFR 72.8 mL/min/1.73     Narrative:      GFR Normal >60  Chronic Kidney Disease <60  Kidney Failure <15      Phosphorus [[id number]]  (Normal) Collected: 07/21/23 0432    Specimen: Blood Updated: 07/21/23 0532     Phosphorus 3.3 mg/dL     CBC & Differential [[id number]]  (Abnormal) Collected: 07/21/23 0432    Specimen: Blood Updated: 07/21/23 0507    Narrative:      The following orders were created for panel order CBC & Differential.  Procedure                               Abnormality         Status                     ---------                                -----------         ------                     CBC Auto Differential[[id number]]        Abnormal            Final result                 Please view results for these tests on the individual orders.    CBC Auto Differential [[id number]]  (Abnormal) Collected: 07/21/23 0432    Specimen: Blood Updated: 07/21/23 0507     WBC 13.24 10*3/mm3      RBC 4.27 10*6/mm3      Hemoglobin 12.9 g/dL      Hematocrit 39.9 %      MCV 93.4 fL      MCH 30.2 pg      MCHC 32.3 g/dL      RDW 15.8 %      RDW-SD 53.3 fl      MPV 9.8 fL      Platelets 357 10*3/mm3      Neutrophil % 67.3 %      Lymphocyte % 19.2 %      Monocyte % 10.3 %      Eosinophil % 1.9 %      Basophil % 0.5 %      Immature Grans % 0.8 %      Neutrophils, Absolute 8.92 10*3/mm3      Lymphocytes, Absolute 2.54 10*3/mm3      Monocytes, Absolute 1.36 10*3/mm3      Eosinophils, Absolute 0.25 10*3/mm3      Basophils, Absolute 0.06 10*3/mm3      Immature Grans, Absolute 0.11 10*3/mm3      nRBC 0.0 /100 WBC     POC Glucose Once [[id number]]  (Abnormal) Collected: 07/20/23 2205    Specimen: Blood Updated: 07/20/23 2207     Glucose 285 mg/dL      Comment: Serial Number: [id number]:  [id number]       POC Glucose Once [[id number]]  (Abnormal) Collected: 07/20/23 1631    Specimen: Blood Updated: 07/20/23 1632     Glucose 190 mg/dL      Comment: Serial Number: [id number]:  [id number]       POC Glucose Once [[id number]]  (Abnormal) Collected: 07/20/23 1228    Specimen: Blood Updated: 07/20/23 1229     Glucose 294 mg/dL      Comment: Serial Number: [id number]:  [id number]                   Medications:   cyanocobalamin, 1,000 mcg, Intramuscular, Q28 Days  divalproex, 500 mg, Oral, BID  enoxaparin, 40 mg, Subcutaneous, Q24H  insulin lispro, 2-9 Units, Subcutaneous, 4x Daily AC & at Bedtime  lidocaine, 2 patch, Transdermal, Q24H  mineral oil-hydrophilic petrolatum, 1 application , Topical, 2 times per day  multivitamin with minerals, 1 tablet, Oral, Daily  risperiDONE, 4 mg,  Oral, BID  senna-docusate sodium, 2 tablet, Oral, BID  sodium chloride, 10 mL, Intravenous, Q12H  traZODone, 100 mg, Oral, Nightly  vitamin B-12, 1,000 mcg, Oral, Daily          Assessment / Plan       Active Hospital Problems:  Active Hospital Problems    Diagnosis     **Hypothermia        Plan:     Continue current treatment protocol and titrate medications as clinically indicated  We will follow make treatment recommendations as indicated      Disposition:      Part of this note may be an electronic transcription/translation of spoken language to printed text using the Dragon dictation system.         Electronically signed by Chris Green MD, 07/21/23, 12:02 PM EDT.

## 2023-07-21 NOTE — PLAN OF CARE
Goal Outcome Evaluation:  Plan of Care Reviewed With: patient        Progress: no change  Outcome Evaluation: patient is alert and oriented this shift but has illogical thoughts and flighty ideas, is easily redirectable. blood glucose monitored. patient remains in safety watch. encouraged the patient to elevate legs due to bilateral lower leg edema. IV dose of lasix given this shift.

## 2023-07-21 NOTE — CONSULTS
"Nutrition Services    Patient Name: Alec Medrano  YOB: 1970  MRN: [id number]  Admission date: 7/9/2023      CLINICAL NUTRITION ASSESSMENT      Reason for Assessment  LOS     H&P:    Past Medical History:   Diagnosis Date    COPD (chronic obstructive pulmonary disease)     Diabetes mellitus     Hyperlipidemia     Hypertension     Hypotension     Pneumothorax     Pulmonary emboli     Unresponsive episode         Current Problems:   Active Hospital Problems    Diagnosis     **Hypothermia         Nutrition/Diet History         Narrative     Pt followed by RD for LOS. Pt is at low risk per nutrition risk screening. No acute nutrition concerns or interventions at this time. RD will continue to follow and monitor per protocol.       Anthropometrics        Current Height, Weight Height: 154.9 cm (61\")  Weight: 93.8 kg (206 lb 12.7 oz)   Current BMI Body mass index is 39.07 kg/mý.       Weight Hx  Wt Readings from Last 30 Encounters:   07/09/23 0748 93.8 kg (206 lb 12.7 oz)   07/09/23 0559 93.8 kg (206 lb 12.7 oz)   07/09/23 0550 93.8 kg (206 lb 12.7 oz)            Wt Change Observation Unable to determine     Estimated/Assessed Needs       Energy Requirements 25-30 kcal/kg adj BW (62.7 kg)   EST Needs (kcal/day) 8993-6918 kcal        Protein Requirements 1.0 g/kg adj BW   EST Daily Needs (g/day) 63 g       Fluid Requirements 1 ml/kcal     Estimated Needs (mL/day) 8927-0874 ml      Labs/Medications         Pertinent Labs Reviewed.   Results from last 7 days   Lab Units 07/21/23  0432 07/20/23  0556 07/19/23  0500   SODIUM mmol/L 136 138 142   POTASSIUM mmol/L 4.7 4.3 4.3   CHLORIDE mmol/L 102 104 107   CO2 mmol/L 25.0 25.9 27.0   BUN mg/dL 18 21* 20   CREATININE mg/dL 1.20 1.24 1.09   CALCIUM mg/dL 9.6 9.2 9.6   BILIRUBIN mg/dL 0.2 <0.2 <0.2   ALK PHOS U/L 143* 131* 123*   ALT (SGPT) U/L 28 29 34   AST (SGOT) U/L 15 15 19   GLUCOSE mg/dL 278* 260* 192*     Results from last 7 days   Lab Units 07/21/23  0432 " Made attempt to schedule a new patient appointment with Hematology oncology and/or Surgical oncology, a voicemail was left  07/20/23  0556 07/19/23  0500   MAGNESIUM mg/dL 1.8 2.0 2.0   PHOSPHORUS mg/dL 3.3 3.1 3.8   HEMOGLOBIN g/dL 12.9* 12.4* 11.9*   HEMATOCRIT % 39.9 38.6 36.9*     No results found for: COVID19  Lab Results   Component Value Date    HGBA1C 7.70 (H) 07/09/2023         Pertinent Medications Reviewed.     Current Nutrition Orders & Evaluation of Intake       Oral Nutrition     Current PO Diet Diet: Cardiac Diets; Healthy Heart (2-3 Na+); Safe Tray; Texture: Soft to Chew (NDD 3); Soft to Chew: Chopped Meat; Fluid Consistency: Thin (IDDSI 0)   Supplement No active supplement orders       Malnutrition Severity Assessment                Nutrition Diagnosis         Nutrition Dx Problem 1 No nutrition diagnosis at this time.       Nutrition Intervention         No intervention indicated.      Medical Nutrition Therapy/Nutrition Education          Learner     Readiness N/A  N/A     Method     Response N/A  N/A     Monitor/Evaluation        Monitor Per protocol.       Nutrition Discharge Plan         No nutrition needs identified at this time.       Electronically signed by:  Hui Cazares RD  07/21/23 13:48 EDT

## 2023-07-21 NOTE — PLAN OF CARE
Goal Outcome Evaluation:  Plan of Care Reviewed With: patient        Progress: no change  Outcome Evaluation: Pt AOX3, disoriented to situation. Safety watch continues to be bedside. Pt ambulated around floor this shift. No new issues/needs noted at this time.

## 2023-07-22 LAB
ALBUMIN SERPL-MCNC: 3.5 G/DL (ref 3.5–5.2)
ALBUMIN/GLOB SERPL: 0.9 G/DL
ALP SERPL-CCNC: 137 U/L (ref 39–117)
ALT SERPL W P-5'-P-CCNC: 26 U/L (ref 1–41)
ANION GAP SERPL CALCULATED.3IONS-SCNC: 10.8 MMOL/L (ref 5–15)
AST SERPL-CCNC: 16 U/L (ref 1–40)
BILIRUB SERPL-MCNC: 0.2 MG/DL (ref 0–1.2)
BUN SERPL-MCNC: 22 MG/DL (ref 6–20)
BUN/CREAT SERPL: 18.6 (ref 7–25)
CALCIUM SPEC-SCNC: 9.7 MG/DL (ref 8.6–10.5)
CHLORIDE SERPL-SCNC: 101 MMOL/L (ref 98–107)
CO2 SERPL-SCNC: 26.2 MMOL/L (ref 22–29)
CREAT SERPL-MCNC: 1.18 MG/DL (ref 0.76–1.27)
EGFRCR SERPLBLD CKD-EPI 2021: 74.2 ML/MIN/1.73
GLOBULIN UR ELPH-MCNC: 3.8 GM/DL
GLUCOSE BLDC GLUCOMTR-MCNC: 230 MG/DL (ref 70–99)
GLUCOSE BLDC GLUCOMTR-MCNC: 240 MG/DL (ref 70–99)
GLUCOSE BLDC GLUCOMTR-MCNC: 326 MG/DL (ref 70–99)
GLUCOSE SERPL-MCNC: 250 MG/DL (ref 65–99)
MAGNESIUM SERPL-MCNC: 2.3 MG/DL (ref 1.6–2.6)
PHOSPHATE SERPL-MCNC: 4.4 MG/DL (ref 2.5–4.5)
POTASSIUM SERPL-SCNC: 4.4 MMOL/L (ref 3.5–5.2)
PROT SERPL-MCNC: 7.3 G/DL (ref 6–8.5)
SODIUM SERPL-SCNC: 138 MMOL/L (ref 136–145)

## 2023-07-22 PROCEDURE — 82948 REAGENT STRIP/BLOOD GLUCOSE: CPT

## 2023-07-22 PROCEDURE — 84100 ASSAY OF PHOSPHORUS: CPT | Performed by: INTERNAL MEDICINE

## 2023-07-22 PROCEDURE — 25010000002 ENOXAPARIN PER 10 MG: Performed by: INTERNAL MEDICINE

## 2023-07-22 PROCEDURE — 83735 ASSAY OF MAGNESIUM: CPT | Performed by: INTERNAL MEDICINE

## 2023-07-22 PROCEDURE — 36415 COLL VENOUS BLD VENIPUNCTURE: CPT | Performed by: INTERNAL MEDICINE

## 2023-07-22 PROCEDURE — 25010000002 HALOPERIDOL LACTATE PER 5 MG: Performed by: PHYSICIAN ASSISTANT

## 2023-07-22 PROCEDURE — 63710000001 INSULIN DETEMIR PER 5 UNITS: Performed by: STUDENT IN AN ORGANIZED HEALTH CARE EDUCATION/TRAINING PROGRAM

## 2023-07-22 PROCEDURE — 25010000002 FUROSEMIDE PER 20 MG: Performed by: STUDENT IN AN ORGANIZED HEALTH CARE EDUCATION/TRAINING PROGRAM

## 2023-07-22 PROCEDURE — 99232 SBSQ HOSP IP/OBS MODERATE 35: CPT | Performed by: STUDENT IN AN ORGANIZED HEALTH CARE EDUCATION/TRAINING PROGRAM

## 2023-07-22 PROCEDURE — 63710000001 INSULIN LISPRO (HUMAN) PER 5 UNITS: Performed by: STUDENT IN AN ORGANIZED HEALTH CARE EDUCATION/TRAINING PROGRAM

## 2023-07-22 PROCEDURE — 80053 COMPREHEN METABOLIC PANEL: CPT | Performed by: INTERNAL MEDICINE

## 2023-07-22 RX ORDER — INSULIN LISPRO 100 [IU]/ML
3-14 INJECTION, SOLUTION INTRAVENOUS; SUBCUTANEOUS
Status: DISCONTINUED | OUTPATIENT
Start: 2023-07-22 | End: 2023-08-07 | Stop reason: HOSPADM

## 2023-07-22 RX ADMIN — Medication 10 ML: at 08:53

## 2023-07-22 RX ADMIN — DIVALPROEX SODIUM 500 MG: 250 TABLET, DELAYED RELEASE ORAL at 23:12

## 2023-07-22 RX ADMIN — DIVALPROEX SODIUM 500 MG: 250 TABLET, DELAYED RELEASE ORAL at 08:13

## 2023-07-22 RX ADMIN — RISPERIDONE 4 MG: 2 TABLET ORAL at 23:12

## 2023-07-22 RX ADMIN — INSULIN LISPRO 5 UNITS: 100 INJECTION, SOLUTION INTRAVENOUS; SUBCUTANEOUS at 23:21

## 2023-07-22 RX ADMIN — INSULIN LISPRO 7 UNITS: 100 INJECTION, SOLUTION INTRAVENOUS; SUBCUTANEOUS at 08:11

## 2023-07-22 RX ADMIN — INSULIN LISPRO 5 UNITS: 100 INJECTION, SOLUTION INTRAVENOUS; SUBCUTANEOUS at 17:45

## 2023-07-22 RX ADMIN — ENOXAPARIN SODIUM 40 MG: 100 INJECTION SUBCUTANEOUS at 06:03

## 2023-07-22 RX ADMIN — TRAZODONE HYDROCHLORIDE 100 MG: 100 TABLET ORAL at 23:12

## 2023-07-22 RX ADMIN — SENNOSIDES AND DOCUSATE SODIUM 2 TABLET: 50; 8.6 TABLET ORAL at 08:12

## 2023-07-22 RX ADMIN — SENNOSIDES AND DOCUSATE SODIUM 2 TABLET: 50; 8.6 TABLET ORAL at 23:12

## 2023-07-22 RX ADMIN — RISPERIDONE 4 MG: 2 TABLET ORAL at 08:13

## 2023-07-22 RX ADMIN — Medication 10 ML: at 23:13

## 2023-07-22 RX ADMIN — MULTIPLE VITAMINS W/ MINERALS TAB 1 TABLET: TAB at 08:12

## 2023-07-22 RX ADMIN — HALOPERIDOL LACTATE 1 MG: 5 INJECTION, SOLUTION INTRAMUSCULAR at 23:51

## 2023-07-22 RX ADMIN — FUROSEMIDE 40 MG: 10 INJECTION, SOLUTION INTRAMUSCULAR; INTRAVENOUS at 08:12

## 2023-07-22 RX ADMIN — INSULIN DETEMIR 5 UNITS: 100 INJECTION, SOLUTION SUBCUTANEOUS at 08:17

## 2023-07-22 RX ADMIN — CYANOCOBALAMIN TAB 500 MCG 1000 MCG: 500 TAB at 08:13

## 2023-07-22 RX ADMIN — INSULIN LISPRO 4 UNITS: 100 INJECTION, SOLUTION INTRAVENOUS; SUBCUTANEOUS at 11:37

## 2023-07-22 NOTE — PLAN OF CARE
Problem: Adult Inpatient Plan of Care  Goal: Plan of Care Review  7/22/2023 1756 by Marissa Dorsey RN  Outcome: Ongoing, Progressing  Flowsheets  Taken 7/22/2023 1756  Plan of Care Reviewed With: patient  Taken 7/22/2023 1754  Progress: no change  Outcome Evaluation: patient is alert and oriented but has illogical thoughts, can be redirected. blood glucose monitored and wound care performed. encouraged elevation of bilateral legs. no other changes at this time.  7/22/2023 1755 by Marissa Dorsey RN  Outcome: Ongoing, Progressing  Flowsheets (Taken 7/22/2023 1754)  Outcome Evaluation: patient is alert and oriented but has illogical thoughts, can be redirected. blood glucose monitored and wound care performed. encouraged elevation of bilateral legs. no other changes at this time.  7/22/2023 1754 by Marissa Dorsey RN  Outcome: Ongoing, Progressing  Flowsheets (Taken 7/22/2023 1754)  Progress: no change  Plan of Care Reviewed With: patient  Outcome Evaluation: patient is alert and oriented but has illogical thoughts, can be redirected. blood glucose monitored and wound care performed. encouraged elevation of bilateral legs. no other changes at this time.   Goal Outcome Evaluation:  Plan of Care Reviewed With: patient        Progress: no change  Outcome Evaluation: patient is alert and oriented but has illogical thoughts, can be redirected. blood glucose monitored and wound care performed. encouraged elevation of bilateral legs. no other changes at this time.
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
## 2023-07-22 NOTE — PLAN OF CARE
Goal Outcome Evaluation:      Pt is A&Ox4. Stable vitals. Close watch. Continue care plan.

## 2023-07-23 LAB
ALBUMIN SERPL-MCNC: 3.7 G/DL (ref 3.5–5.2)
ALBUMIN/GLOB SERPL: 0.9 G/DL
ALP SERPL-CCNC: 140 U/L (ref 39–117)
ALT SERPL W P-5'-P-CCNC: 23 U/L (ref 1–41)
ANION GAP SERPL CALCULATED.3IONS-SCNC: 10 MMOL/L (ref 5–15)
AST SERPL-CCNC: 13 U/L (ref 1–40)
BILIRUB SERPL-MCNC: 0.2 MG/DL (ref 0–1.2)
BUN SERPL-MCNC: 23 MG/DL (ref 6–20)
BUN/CREAT SERPL: 19.7 (ref 7–25)
CALCIUM SPEC-SCNC: 9.8 MG/DL (ref 8.6–10.5)
CHLORIDE SERPL-SCNC: 97 MMOL/L (ref 98–107)
CO2 SERPL-SCNC: 28 MMOL/L (ref 22–29)
CREAT SERPL-MCNC: 1.17 MG/DL (ref 0.76–1.27)
EGFRCR SERPLBLD CKD-EPI 2021: 75 ML/MIN/1.73
GLOBULIN UR ELPH-MCNC: 4.1 GM/DL
GLUCOSE BLDC GLUCOMTR-MCNC: 174 MG/DL (ref 70–99)
GLUCOSE BLDC GLUCOMTR-MCNC: 190 MG/DL (ref 70–99)
GLUCOSE BLDC GLUCOMTR-MCNC: 209 MG/DL (ref 70–99)
GLUCOSE BLDC GLUCOMTR-MCNC: 221 MG/DL (ref 70–99)
GLUCOSE BLDC GLUCOMTR-MCNC: 237 MG/DL (ref 70–99)
GLUCOSE SERPL-MCNC: 247 MG/DL (ref 65–99)
MAGNESIUM SERPL-MCNC: 1.9 MG/DL (ref 1.6–2.6)
PHOSPHATE SERPL-MCNC: 4.5 MG/DL (ref 2.5–4.5)
POTASSIUM SERPL-SCNC: 3.7 MMOL/L (ref 3.5–5.2)
PROT SERPL-MCNC: 7.8 G/DL (ref 6–8.5)
SODIUM SERPL-SCNC: 135 MMOL/L (ref 136–145)

## 2023-07-23 PROCEDURE — 25010000002 ENOXAPARIN PER 10 MG: Performed by: INTERNAL MEDICINE

## 2023-07-23 PROCEDURE — 84100 ASSAY OF PHOSPHORUS: CPT | Performed by: INTERNAL MEDICINE

## 2023-07-23 PROCEDURE — 36415 COLL VENOUS BLD VENIPUNCTURE: CPT | Performed by: INTERNAL MEDICINE

## 2023-07-23 PROCEDURE — 80053 COMPREHEN METABOLIC PANEL: CPT | Performed by: INTERNAL MEDICINE

## 2023-07-23 PROCEDURE — 99232 SBSQ HOSP IP/OBS MODERATE 35: CPT | Performed by: STUDENT IN AN ORGANIZED HEALTH CARE EDUCATION/TRAINING PROGRAM

## 2023-07-23 PROCEDURE — 63710000001 INSULIN LISPRO (HUMAN) PER 5 UNITS: Performed by: STUDENT IN AN ORGANIZED HEALTH CARE EDUCATION/TRAINING PROGRAM

## 2023-07-23 PROCEDURE — 83735 ASSAY OF MAGNESIUM: CPT | Performed by: INTERNAL MEDICINE

## 2023-07-23 PROCEDURE — 63710000001 INSULIN DETEMIR PER 5 UNITS: Performed by: STUDENT IN AN ORGANIZED HEALTH CARE EDUCATION/TRAINING PROGRAM

## 2023-07-23 PROCEDURE — 25010000002 FUROSEMIDE PER 20 MG: Performed by: STUDENT IN AN ORGANIZED HEALTH CARE EDUCATION/TRAINING PROGRAM

## 2023-07-23 PROCEDURE — 82948 REAGENT STRIP/BLOOD GLUCOSE: CPT

## 2023-07-23 RX ORDER — AMOXICILLIN 250 MG
2 CAPSULE ORAL 2 TIMES DAILY PRN
Status: DISCONTINUED | OUTPATIENT
Start: 2023-07-23 | End: 2023-08-07 | Stop reason: HOSPADM

## 2023-07-23 RX ORDER — BISACODYL 5 MG/1
5 TABLET, DELAYED RELEASE ORAL DAILY PRN
Status: DISCONTINUED | OUTPATIENT
Start: 2023-07-23 | End: 2023-08-07 | Stop reason: HOSPADM

## 2023-07-23 RX ORDER — BISACODYL 10 MG
10 SUPPOSITORY, RECTAL RECTAL DAILY PRN
Status: DISCONTINUED | OUTPATIENT
Start: 2023-07-23 | End: 2023-08-07 | Stop reason: HOSPADM

## 2023-07-23 RX ORDER — POLYETHYLENE GLYCOL 3350 17 G/17G
17 POWDER, FOR SOLUTION ORAL DAILY PRN
Status: DISCONTINUED | OUTPATIENT
Start: 2023-07-23 | End: 2023-08-07 | Stop reason: HOSPADM

## 2023-07-23 RX ORDER — TROLAMINE SALICYLATE 10 G/100G
1 CREAM TOPICAL 4 TIMES DAILY PRN
Status: DISCONTINUED | OUTPATIENT
Start: 2023-07-23 | End: 2023-07-23

## 2023-07-23 RX ADMIN — INSULIN DETEMIR 5 UNITS: 100 INJECTION, SOLUTION SUBCUTANEOUS at 08:22

## 2023-07-23 RX ADMIN — INSULIN LISPRO 5 UNITS: 100 INJECTION, SOLUTION INTRAVENOUS; SUBCUTANEOUS at 11:40

## 2023-07-23 RX ADMIN — MULTIPLE VITAMINS W/ MINERALS TAB 1 TABLET: TAB at 08:23

## 2023-07-23 RX ADMIN — TRAZODONE HYDROCHLORIDE 100 MG: 100 TABLET ORAL at 20:04

## 2023-07-23 RX ADMIN — DIVALPROEX SODIUM 500 MG: 250 TABLET, DELAYED RELEASE ORAL at 20:04

## 2023-07-23 RX ADMIN — CYANOCOBALAMIN TAB 500 MCG 1000 MCG: 500 TAB at 08:23

## 2023-07-23 RX ADMIN — SENNOSIDES AND DOCUSATE SODIUM 2 TABLET: 50; 8.6 TABLET ORAL at 08:23

## 2023-07-23 RX ADMIN — DIVALPROEX SODIUM 500 MG: 250 TABLET, DELAYED RELEASE ORAL at 08:23

## 2023-07-23 RX ADMIN — FUROSEMIDE 40 MG: 10 INJECTION, SOLUTION INTRAMUSCULAR; INTRAVENOUS at 08:23

## 2023-07-23 RX ADMIN — INSULIN LISPRO 3 UNITS: 100 INJECTION, SOLUTION INTRAVENOUS; SUBCUTANEOUS at 17:40

## 2023-07-23 RX ADMIN — Medication 10 ML: at 20:05

## 2023-07-23 RX ADMIN — Medication 10 ML: at 08:22

## 2023-07-23 RX ADMIN — ENOXAPARIN SODIUM 40 MG: 100 INJECTION SUBCUTANEOUS at 06:49

## 2023-07-23 RX ADMIN — RISPERIDONE 4 MG: 2 TABLET ORAL at 20:04

## 2023-07-23 RX ADMIN — INSULIN LISPRO 5 UNITS: 100 INJECTION, SOLUTION INTRAVENOUS; SUBCUTANEOUS at 20:04

## 2023-07-23 RX ADMIN — RISPERIDONE 4 MG: 2 TABLET ORAL at 08:23

## 2023-07-23 RX ADMIN — INSULIN LISPRO 3 UNITS: 100 INJECTION, SOLUTION INTRAVENOUS; SUBCUTANEOUS at 08:22

## 2023-07-23 NOTE — PLAN OF CARE
Goal Outcome Evaluation:  Plan of Care Reviewed With: patient        Progress: no change  Outcome Evaluation: Pt is alert and oriented, but has illogical thoughts and delusions. Pt can be redirected at times; however, pt became very agitated and irritable with staff in hallway, administered prn haldol as ordered. Pt has calmed down since. Sitter at bedside. Pending placement at this time. No new issues or new needs noted at this time.

## 2023-07-23 NOTE — PLAN OF CARE
Goal Outcome Evaluation:  Plan of Care Reviewed With: patient        Progress: no change  Outcome Evaluation: patient is alert and oriented but has illogical thoughts and flight of ideas, can be redirected. blood glucose monitored this shift. pt did not want wound care performed. sitter remains at bedside with patient. no other changes at this time
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
## 2023-07-24 LAB
ALBUMIN SERPL-MCNC: 3.2 G/DL (ref 3.5–5.2)
ALBUMIN/GLOB SERPL: 0.9 G/DL
ALP SERPL-CCNC: 118 U/L (ref 39–117)
ALT SERPL W P-5'-P-CCNC: 19 U/L (ref 1–41)
ANION GAP SERPL CALCULATED.3IONS-SCNC: 8.5 MMOL/L (ref 5–15)
AST SERPL-CCNC: 14 U/L (ref 1–40)
BASOPHILS # BLD AUTO: 0.03 10*3/MM3 (ref 0–0.2)
BASOPHILS NFR BLD AUTO: 0.3 % (ref 0–1.5)
BILIRUB SERPL-MCNC: 0.2 MG/DL (ref 0–1.2)
BUN SERPL-MCNC: 21 MG/DL (ref 6–20)
BUN/CREAT SERPL: 18.4 (ref 7–25)
CALCIUM SPEC-SCNC: 9.4 MG/DL (ref 8.6–10.5)
CHLORIDE SERPL-SCNC: 101 MMOL/L (ref 98–107)
CO2 SERPL-SCNC: 25.5 MMOL/L (ref 22–29)
CREAT SERPL-MCNC: 1.14 MG/DL (ref 0.76–1.27)
DEPRECATED RDW RBC AUTO: 52.9 FL (ref 37–54)
EGFRCR SERPLBLD CKD-EPI 2021: 77.4 ML/MIN/1.73
EOSINOPHIL # BLD AUTO: 0.25 10*3/MM3 (ref 0–0.4)
EOSINOPHIL NFR BLD AUTO: 2.3 % (ref 0.3–6.2)
ERYTHROCYTE [DISTWIDTH] IN BLOOD BY AUTOMATED COUNT: 15.5 % (ref 12.3–15.4)
GLOBULIN UR ELPH-MCNC: 3.5 GM/DL
GLUCOSE BLDC GLUCOMTR-MCNC: 181 MG/DL (ref 70–99)
GLUCOSE BLDC GLUCOMTR-MCNC: 196 MG/DL (ref 70–99)
GLUCOSE BLDC GLUCOMTR-MCNC: 216 MG/DL (ref 70–99)
GLUCOSE BLDC GLUCOMTR-MCNC: 232 MG/DL (ref 70–99)
GLUCOSE SERPL-MCNC: 206 MG/DL (ref 65–99)
HCT VFR BLD AUTO: 38.4 % (ref 37.5–51)
HGB BLD-MCNC: 12.5 G/DL (ref 13–17.7)
IMM GRANULOCYTES # BLD AUTO: 0.06 10*3/MM3 (ref 0–0.05)
IMM GRANULOCYTES NFR BLD AUTO: 0.6 % (ref 0–0.5)
LYMPHOCYTES # BLD AUTO: 2.27 10*3/MM3 (ref 0.7–3.1)
LYMPHOCYTES NFR BLD AUTO: 21.1 % (ref 19.6–45.3)
MAGNESIUM SERPL-MCNC: 2 MG/DL (ref 1.6–2.6)
MCH RBC QN AUTO: 30.3 PG (ref 26.6–33)
MCHC RBC AUTO-ENTMCNC: 32.6 G/DL (ref 31.5–35.7)
MCV RBC AUTO: 93.2 FL (ref 79–97)
MONOCYTES # BLD AUTO: 0.88 10*3/MM3 (ref 0.1–0.9)
MONOCYTES NFR BLD AUTO: 8.2 % (ref 5–12)
NEUTROPHILS NFR BLD AUTO: 67.5 % (ref 42.7–76)
NEUTROPHILS NFR BLD AUTO: 7.29 10*3/MM3 (ref 1.7–7)
NRBC BLD AUTO-RTO: 0 /100 WBC (ref 0–0.2)
PHOSPHATE SERPL-MCNC: 4.2 MG/DL (ref 2.5–4.5)
PLATELET # BLD AUTO: 412 10*3/MM3 (ref 140–450)
PMV BLD AUTO: 9.4 FL (ref 6–12)
POTASSIUM SERPL-SCNC: 4.3 MMOL/L (ref 3.5–5.2)
PROT SERPL-MCNC: 6.7 G/DL (ref 6–8.5)
RBC # BLD AUTO: 4.12 10*6/MM3 (ref 4.14–5.8)
SODIUM SERPL-SCNC: 135 MMOL/L (ref 136–145)
WBC NRBC COR # BLD: 10.78 10*3/MM3 (ref 3.4–10.8)

## 2023-07-24 PROCEDURE — 63710000001 INSULIN LISPRO (HUMAN) PER 5 UNITS: Performed by: STUDENT IN AN ORGANIZED HEALTH CARE EDUCATION/TRAINING PROGRAM

## 2023-07-24 PROCEDURE — 85025 COMPLETE CBC W/AUTO DIFF WBC: CPT | Performed by: STUDENT IN AN ORGANIZED HEALTH CARE EDUCATION/TRAINING PROGRAM

## 2023-07-24 PROCEDURE — 63710000001 INSULIN DETEMIR PER 5 UNITS: Performed by: STUDENT IN AN ORGANIZED HEALTH CARE EDUCATION/TRAINING PROGRAM

## 2023-07-24 PROCEDURE — 82948 REAGENT STRIP/BLOOD GLUCOSE: CPT

## 2023-07-24 PROCEDURE — 80053 COMPREHEN METABOLIC PANEL: CPT | Performed by: INTERNAL MEDICINE

## 2023-07-24 PROCEDURE — 99232 SBSQ HOSP IP/OBS MODERATE 35: CPT | Performed by: STUDENT IN AN ORGANIZED HEALTH CARE EDUCATION/TRAINING PROGRAM

## 2023-07-24 PROCEDURE — 84100 ASSAY OF PHOSPHORUS: CPT | Performed by: INTERNAL MEDICINE

## 2023-07-24 PROCEDURE — 83735 ASSAY OF MAGNESIUM: CPT | Performed by: INTERNAL MEDICINE

## 2023-07-24 PROCEDURE — 25010000002 FUROSEMIDE PER 20 MG: Performed by: STUDENT IN AN ORGANIZED HEALTH CARE EDUCATION/TRAINING PROGRAM

## 2023-07-24 PROCEDURE — 25010000002 ENOXAPARIN PER 10 MG: Performed by: INTERNAL MEDICINE

## 2023-07-24 RX ORDER — FUROSEMIDE 20 MG/1
20 TABLET ORAL DAILY
Status: DISCONTINUED | OUTPATIENT
Start: 2023-07-24 | End: 2023-08-04

## 2023-07-24 RX ADMIN — FUROSEMIDE 40 MG: 10 INJECTION, SOLUTION INTRAMUSCULAR; INTRAVENOUS at 08:17

## 2023-07-24 RX ADMIN — Medication 10 ML: at 08:15

## 2023-07-24 RX ADMIN — CYANOCOBALAMIN TAB 500 MCG 1000 MCG: 500 TAB at 08:15

## 2023-07-24 RX ADMIN — INSULIN LISPRO 3 UNITS: 100 INJECTION, SOLUTION INTRAVENOUS; SUBCUTANEOUS at 08:15

## 2023-07-24 RX ADMIN — RISPERIDONE 4 MG: 2 TABLET ORAL at 20:30

## 2023-07-24 RX ADMIN — WHITE PETROLATUM 1 APPLICATION: 1.75 OINTMENT TOPICAL at 12:28

## 2023-07-24 RX ADMIN — FUROSEMIDE 20 MG: 20 TABLET ORAL at 17:38

## 2023-07-24 RX ADMIN — ENOXAPARIN SODIUM 40 MG: 100 INJECTION SUBCUTANEOUS at 05:55

## 2023-07-24 RX ADMIN — INSULIN LISPRO 5 UNITS: 100 INJECTION, SOLUTION INTRAVENOUS; SUBCUTANEOUS at 17:38

## 2023-07-24 RX ADMIN — INSULIN DETEMIR 10 UNITS: 100 INJECTION, SOLUTION SUBCUTANEOUS at 08:15

## 2023-07-24 RX ADMIN — RISPERIDONE 4 MG: 2 TABLET ORAL at 08:15

## 2023-07-24 RX ADMIN — DIVALPROEX SODIUM 500 MG: 250 TABLET, DELAYED RELEASE ORAL at 08:15

## 2023-07-24 RX ADMIN — MULTIPLE VITAMINS W/ MINERALS TAB 1 TABLET: TAB at 08:15

## 2023-07-24 RX ADMIN — INSULIN LISPRO 3 UNITS: 100 INJECTION, SOLUTION INTRAVENOUS; SUBCUTANEOUS at 20:29

## 2023-07-24 RX ADMIN — Medication 10 ML: at 20:29

## 2023-07-24 RX ADMIN — DIVALPROEX SODIUM 500 MG: 250 TABLET, DELAYED RELEASE ORAL at 20:30

## 2023-07-24 RX ADMIN — TRAZODONE HYDROCHLORIDE 100 MG: 100 TABLET ORAL at 20:30

## 2023-07-24 RX ADMIN — INSULIN LISPRO 5 UNITS: 100 INJECTION, SOLUTION INTRAVENOUS; SUBCUTANEOUS at 12:28

## 2023-07-24 NOTE — PLAN OF CARE
Goal Outcome Evaluation:  Plan of Care Reviewed With: patient        Progress: no change  Outcome Evaluation: Patient remains alert and oriented x4 with illogical thought process. Patient also continues to be flighty. No complaints of pain throughout the shift. Blood glucose monitored. No new issues at this time. Safety sitter remains at bedside.

## 2023-07-24 NOTE — PLAN OF CARE
Goal Outcome Evaluation:  Plan of Care Reviewed With: patient        Progress: no change  Outcome Evaluation: pt continues to have illogical thoughts and is very flighty however is easily redirected. pt IV went bad overnight and pt refused another IV at this time, barb SAMPSON notified. Pt remains with safety watch at bedside and ambulates in [place] frequently
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
## 2023-07-25 LAB
ALBUMIN SERPL-MCNC: 3.3 G/DL (ref 3.5–5.2)
ALBUMIN/GLOB SERPL: 1 G/DL
ALP SERPL-CCNC: 118 U/L (ref 39–117)
ALT SERPL W P-5'-P-CCNC: 18 U/L (ref 1–41)
ANION GAP SERPL CALCULATED.3IONS-SCNC: 8.3 MMOL/L (ref 5–15)
AST SERPL-CCNC: 13 U/L (ref 1–40)
BILIRUB SERPL-MCNC: 0.2 MG/DL (ref 0–1.2)
BUN SERPL-MCNC: 22 MG/DL (ref 6–20)
BUN/CREAT SERPL: 19.3 (ref 7–25)
CALCIUM SPEC-SCNC: 9.5 MG/DL (ref 8.6–10.5)
CHLORIDE SERPL-SCNC: 100 MMOL/L (ref 98–107)
CO2 SERPL-SCNC: 27.7 MMOL/L (ref 22–29)
CREAT SERPL-MCNC: 1.14 MG/DL (ref 0.76–1.27)
EGFRCR SERPLBLD CKD-EPI 2021: 77.4 ML/MIN/1.73
GLOBULIN UR ELPH-MCNC: 3.4 GM/DL
GLUCOSE BLDC GLUCOMTR-MCNC: 143 MG/DL (ref 70–99)
GLUCOSE BLDC GLUCOMTR-MCNC: 170 MG/DL (ref 70–99)
GLUCOSE BLDC GLUCOMTR-MCNC: 179 MG/DL (ref 70–99)
GLUCOSE BLDC GLUCOMTR-MCNC: 244 MG/DL (ref 70–99)
GLUCOSE SERPL-MCNC: 253 MG/DL (ref 65–99)
MAGNESIUM SERPL-MCNC: 2.1 MG/DL (ref 1.6–2.6)
PHOSPHATE SERPL-MCNC: 4.3 MG/DL (ref 2.5–4.5)
POTASSIUM SERPL-SCNC: 4 MMOL/L (ref 3.5–5.2)
PROT SERPL-MCNC: 6.7 G/DL (ref 6–8.5)
SODIUM SERPL-SCNC: 136 MMOL/L (ref 136–145)

## 2023-07-25 PROCEDURE — 99232 SBSQ HOSP IP/OBS MODERATE 35: CPT | Performed by: INTERNAL MEDICINE

## 2023-07-25 PROCEDURE — 25010000002 HALOPERIDOL LACTATE PER 5 MG: Performed by: PHYSICIAN ASSISTANT

## 2023-07-25 PROCEDURE — 63710000001 INSULIN LISPRO (HUMAN) PER 5 UNITS: Performed by: STUDENT IN AN ORGANIZED HEALTH CARE EDUCATION/TRAINING PROGRAM

## 2023-07-25 PROCEDURE — 25010000002 ENOXAPARIN PER 10 MG: Performed by: INTERNAL MEDICINE

## 2023-07-25 PROCEDURE — 36415 COLL VENOUS BLD VENIPUNCTURE: CPT | Performed by: INTERNAL MEDICINE

## 2023-07-25 PROCEDURE — 82948 REAGENT STRIP/BLOOD GLUCOSE: CPT

## 2023-07-25 PROCEDURE — 63710000001 INSULIN DETEMIR PER 5 UNITS: Performed by: STUDENT IN AN ORGANIZED HEALTH CARE EDUCATION/TRAINING PROGRAM

## 2023-07-25 PROCEDURE — 83735 ASSAY OF MAGNESIUM: CPT | Performed by: INTERNAL MEDICINE

## 2023-07-25 PROCEDURE — 84100 ASSAY OF PHOSPHORUS: CPT | Performed by: INTERNAL MEDICINE

## 2023-07-25 PROCEDURE — 80053 COMPREHEN METABOLIC PANEL: CPT | Performed by: INTERNAL MEDICINE

## 2023-07-25 RX ADMIN — FUROSEMIDE 20 MG: 20 TABLET ORAL at 08:30

## 2023-07-25 RX ADMIN — Medication 10 ML: at 08:32

## 2023-07-25 RX ADMIN — LIDOCAINE 2 PATCH: 700 PATCH TOPICAL at 08:30

## 2023-07-25 RX ADMIN — INSULIN LISPRO 3 UNITS: 100 INJECTION, SOLUTION INTRAVENOUS; SUBCUTANEOUS at 20:16

## 2023-07-25 RX ADMIN — DIVALPROEX SODIUM 500 MG: 250 TABLET, DELAYED RELEASE ORAL at 08:30

## 2023-07-25 RX ADMIN — INSULIN DETEMIR 10 UNITS: 100 INJECTION, SOLUTION SUBCUTANEOUS at 08:31

## 2023-07-25 RX ADMIN — ENOXAPARIN SODIUM 40 MG: 100 INJECTION SUBCUTANEOUS at 07:53

## 2023-07-25 RX ADMIN — WHITE PETROLATUM 1 APPLICATION: 1.75 OINTMENT TOPICAL at 00:17

## 2023-07-25 RX ADMIN — CYANOCOBALAMIN TAB 500 MCG 1000 MCG: 500 TAB at 08:30

## 2023-07-25 RX ADMIN — INSULIN LISPRO 5 UNITS: 100 INJECTION, SOLUTION INTRAVENOUS; SUBCUTANEOUS at 12:36

## 2023-07-25 RX ADMIN — INSULIN LISPRO 3 UNITS: 100 INJECTION, SOLUTION INTRAVENOUS; SUBCUTANEOUS at 08:31

## 2023-07-25 RX ADMIN — MULTIPLE VITAMINS W/ MINERALS TAB 1 TABLET: TAB at 08:30

## 2023-07-25 RX ADMIN — Medication 10 ML: at 20:08

## 2023-07-25 RX ADMIN — HALOPERIDOL LACTATE 1 MG: 5 INJECTION, SOLUTION INTRAMUSCULAR at 04:24

## 2023-07-25 RX ADMIN — RISPERIDONE 4 MG: 2 TABLET ORAL at 08:30

## 2023-07-25 RX ADMIN — RISPERIDONE 4 MG: 2 TABLET ORAL at 20:08

## 2023-07-25 RX ADMIN — TRAZODONE HYDROCHLORIDE 100 MG: 100 TABLET ORAL at 20:08

## 2023-07-25 RX ADMIN — DIVALPROEX SODIUM 500 MG: 250 TABLET, DELAYED RELEASE ORAL at 20:08

## 2023-07-25 NOTE — PLAN OF CARE
Goal Outcome Evaluation:      Pt was A&O x4 with intermittent confusion. No complaints of pain. Pt was restless, agitated,  irritable and would walk to the hallway. Difficult to follow directions. Haldol given as par MAR. Sitter at beside.  Continue care plan.
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
## 2023-07-26 LAB
GLUCOSE BLDC GLUCOMTR-MCNC: 140 MG/DL (ref 70–99)
GLUCOSE BLDC GLUCOMTR-MCNC: 154 MG/DL (ref 70–99)
GLUCOSE BLDC GLUCOMTR-MCNC: 177 MG/DL (ref 70–99)
GLUCOSE BLDC GLUCOMTR-MCNC: 293 MG/DL (ref 70–99)

## 2023-07-26 PROCEDURE — 63710000001 INSULIN LISPRO (HUMAN) PER 5 UNITS: Performed by: STUDENT IN AN ORGANIZED HEALTH CARE EDUCATION/TRAINING PROGRAM

## 2023-07-26 PROCEDURE — 63710000001 INSULIN DETEMIR PER 5 UNITS: Performed by: INTERNAL MEDICINE

## 2023-07-26 PROCEDURE — 25010000002 HALOPERIDOL LACTATE PER 5 MG: Performed by: PHYSICIAN ASSISTANT

## 2023-07-26 PROCEDURE — 82948 REAGENT STRIP/BLOOD GLUCOSE: CPT

## 2023-07-26 PROCEDURE — 25010000002 HALOPERIDOL LACTATE PER 5 MG

## 2023-07-26 PROCEDURE — 25010000002 ENOXAPARIN PER 10 MG: Performed by: INTERNAL MEDICINE

## 2023-07-26 PROCEDURE — 25010000002 LORAZEPAM PER 2 MG: Performed by: FAMILY MEDICINE

## 2023-07-26 PROCEDURE — 99231 SBSQ HOSP IP/OBS SF/LOW 25: CPT | Performed by: INTERNAL MEDICINE

## 2023-07-26 RX ORDER — HALOPERIDOL 5 MG/ML
1 INJECTION INTRAMUSCULAR EVERY 4 HOURS PRN
Status: DISCONTINUED | OUTPATIENT
Start: 2023-07-26 | End: 2023-08-07 | Stop reason: HOSPADM

## 2023-07-26 RX ORDER — HALOPERIDOL 5 MG/ML
0.5 INJECTION INTRAMUSCULAR ONCE AS NEEDED
Status: COMPLETED | OUTPATIENT
Start: 2023-07-26 | End: 2023-07-26

## 2023-07-26 RX ORDER — LORAZEPAM 2 MG/ML
1 INJECTION INTRAMUSCULAR ONCE
Status: COMPLETED | OUTPATIENT
Start: 2023-07-26 | End: 2023-07-26

## 2023-07-26 RX ADMIN — DIVALPROEX SODIUM 500 MG: 250 TABLET, DELAYED RELEASE ORAL at 08:09

## 2023-07-26 RX ADMIN — RISPERIDONE 4 MG: 2 TABLET ORAL at 20:03

## 2023-07-26 RX ADMIN — HALOPERIDOL LACTATE 1 MG: 5 INJECTION, SOLUTION INTRAMUSCULAR at 03:25

## 2023-07-26 RX ADMIN — RISPERIDONE 4 MG: 2 TABLET ORAL at 08:09

## 2023-07-26 RX ADMIN — QUETIAPINE FUMARATE 25 MG: 25 TABLET ORAL at 03:45

## 2023-07-26 RX ADMIN — HALOPERIDOL LACTATE 1 MG: 5 INJECTION, SOLUTION INTRAMUSCULAR at 21:40

## 2023-07-26 RX ADMIN — INSULIN DETEMIR 15 UNITS: 100 INJECTION, SOLUTION SUBCUTANEOUS at 08:09

## 2023-07-26 RX ADMIN — INSULIN LISPRO 3 UNITS: 100 INJECTION, SOLUTION INTRAVENOUS; SUBCUTANEOUS at 16:56

## 2023-07-26 RX ADMIN — ENOXAPARIN SODIUM 40 MG: 100 INJECTION SUBCUTANEOUS at 08:10

## 2023-07-26 RX ADMIN — DIVALPROEX SODIUM 500 MG: 250 TABLET, DELAYED RELEASE ORAL at 20:03

## 2023-07-26 RX ADMIN — TRAZODONE HYDROCHLORIDE 100 MG: 100 TABLET ORAL at 20:02

## 2023-07-26 RX ADMIN — FUROSEMIDE 20 MG: 20 TABLET ORAL at 08:09

## 2023-07-26 RX ADMIN — MULTIPLE VITAMINS W/ MINERALS TAB 1 TABLET: TAB at 08:09

## 2023-07-26 RX ADMIN — HALOPERIDOL LACTATE 0.5 MG: 5 INJECTION, SOLUTION INTRAMUSCULAR at 05:34

## 2023-07-26 RX ADMIN — INSULIN LISPRO 3 UNITS: 100 INJECTION, SOLUTION INTRAVENOUS; SUBCUTANEOUS at 08:09

## 2023-07-26 RX ADMIN — INSULIN LISPRO 8 UNITS: 100 INJECTION, SOLUTION INTRAVENOUS; SUBCUTANEOUS at 20:12

## 2023-07-26 RX ADMIN — CYANOCOBALAMIN TAB 500 MCG 1000 MCG: 500 TAB at 08:09

## 2023-07-26 RX ADMIN — LORAZEPAM 1 MG: 2 INJECTION INTRAMUSCULAR; INTRAVENOUS at 23:01
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
## 2023-07-26 NOTE — SIGNIFICANT NOTE
Wound Eval / Progress Noted    LORI Curry     Patient Name: Alec Medrano  : 1970  MRN: [id number]  Today's Date: 2023                 Admit Date: 2023    Visit Dx:    ICD-10-CM ICD-9-CM   1. Hypothermia due to cold environment  T68.XXXA 991.6   2. Hypernatremia  E87.0 276.0   3. Encephalopathy acute  G93.40 348.30   4. Bradycardia  R00.1 427.89   5. Hypotension, unspecified hypotension type  I95.9 458.9   6. Decreased activities of daily living (ADL)  Z78.9 V49.89   7. Difficulty walking  R26.2 719.7         Hypothermia        Past Medical History:   Diagnosis Date    COPD (chronic obstructive pulmonary disease)     Diabetes mellitus     Hyperlipidemia     Hypertension     Hypotension     Pneumothorax     Pulmonary emboli     Unresponsive episode         Past Surgical History:   Procedure Laterality Date    SPLENECTOMY           Physical Assessment:     23 1000   Skin   Skin WDL X;all   Skin Color/Characteristics without discoloration;other (see comments)  (right hip, left knee full wound closure, pink epithelium)   Skin Temperature warm   Skin Moisture dry   Skin Elasticity quick return to original state       Wound Check / Follow-up:  Patient seen today for a wound check and dressing change. Patient currently with a safety watch present. Patient agreeable to assessment. Left knee and right hip with full wound closure at this time. Pink intact epithelium is present. Cleansed with NS. Recommend to provide quality skin care and protection of tissue. Apply the blue top barrier cream to tissue and leave open to air.    Impression: full wound closure    Short term goals:  maintain skin assessment, topical skin care    Lana Jones RN    2023    13:34 EDT

## 2023-07-26 NOTE — PLAN OF CARE
Goal Outcome Evaluation:  Plan of Care Reviewed With: patient        Progress: no change  Outcome Evaluation: Pt denied pain/discomfort this shift. Pt became agitated and restless later in shift, admininstered prn haldol and seroquel as ordered. Safety watch at bedside. Pt has taken multiple showers this shift. No new issues or new needs noted at this time.

## 2023-07-26 NOTE — PLAN OF CARE
Goal Outcome Evaluation:  Plan of Care Reviewed With: patient        Progress: no change  Outcome Evaluation: patient remains alert and orineted x4 with impulsive/flighty behavior. no complaints of pain throughout the shift. safety sitter remains at bedside. no new issues at this time.

## 2023-07-27 LAB
GLUCOSE BLDC GLUCOMTR-MCNC: 158 MG/DL (ref 70–99)
GLUCOSE BLDC GLUCOMTR-MCNC: 161 MG/DL (ref 70–99)
GLUCOSE BLDC GLUCOMTR-MCNC: 218 MG/DL (ref 70–99)
GLUCOSE BLDC GLUCOMTR-MCNC: 266 MG/DL (ref 70–99)

## 2023-07-27 PROCEDURE — 63710000001 INSULIN DETEMIR PER 5 UNITS: Performed by: INTERNAL MEDICINE

## 2023-07-27 PROCEDURE — 25010000002 ENOXAPARIN PER 10 MG: Performed by: INTERNAL MEDICINE

## 2023-07-27 PROCEDURE — 25010000002 HALOPERIDOL LACTATE PER 5 MG: Performed by: PHYSICIAN ASSISTANT

## 2023-07-27 PROCEDURE — 63710000001 INSULIN LISPRO (HUMAN) PER 5 UNITS: Performed by: STUDENT IN AN ORGANIZED HEALTH CARE EDUCATION/TRAINING PROGRAM

## 2023-07-27 PROCEDURE — 99232 SBSQ HOSP IP/OBS MODERATE 35: CPT | Performed by: INTERNAL MEDICINE

## 2023-07-27 PROCEDURE — 82948 REAGENT STRIP/BLOOD GLUCOSE: CPT

## 2023-07-27 RX ORDER — QUETIAPINE FUMARATE 100 MG/1
100 TABLET, FILM COATED ORAL EVERY 6 HOURS PRN
Status: DISCONTINUED | OUTPATIENT
Start: 2023-07-27 | End: 2023-07-29

## 2023-07-27 RX ADMIN — QUETIAPINE FUMARATE 100 MG: 100 TABLET ORAL at 16:57

## 2023-07-27 RX ADMIN — RISPERIDONE 4 MG: 2 TABLET ORAL at 08:10

## 2023-07-27 RX ADMIN — INSULIN LISPRO 3 UNITS: 100 INJECTION, SOLUTION INTRAVENOUS; SUBCUTANEOUS at 08:09

## 2023-07-27 RX ADMIN — DIVALPROEX SODIUM 750 MG: 250 TABLET, DELAYED RELEASE ORAL at 23:21

## 2023-07-27 RX ADMIN — QUETIAPINE FUMARATE 25 MG: 25 TABLET ORAL at 01:45

## 2023-07-27 RX ADMIN — CYANOCOBALAMIN TAB 500 MCG 1000 MCG: 500 TAB at 08:10

## 2023-07-27 RX ADMIN — HALOPERIDOL LACTATE 1 MG: 5 INJECTION, SOLUTION INTRAMUSCULAR at 01:50

## 2023-07-27 RX ADMIN — MULTIPLE VITAMINS W/ MINERALS TAB 1 TABLET: TAB at 08:10

## 2023-07-27 RX ADMIN — ACETAMINOPHEN 650 MG: 325 TABLET ORAL at 15:59

## 2023-07-27 RX ADMIN — ENOXAPARIN SODIUM 40 MG: 100 INJECTION SUBCUTANEOUS at 08:09

## 2023-07-27 RX ADMIN — RISPERIDONE 4 MG: 2 TABLET ORAL at 23:21

## 2023-07-27 RX ADMIN — TRAZODONE HYDROCHLORIDE 100 MG: 100 TABLET ORAL at 23:21

## 2023-07-27 RX ADMIN — FUROSEMIDE 20 MG: 20 TABLET ORAL at 08:10

## 2023-07-27 RX ADMIN — HALOPERIDOL LACTATE 1 MG: 5 INJECTION, SOLUTION INTRAMUSCULAR at 05:02

## 2023-07-27 RX ADMIN — DIVALPROEX SODIUM 500 MG: 250 TABLET, DELAYED RELEASE ORAL at 08:10

## 2023-07-27 RX ADMIN — INSULIN DETEMIR 15 UNITS: 100 INJECTION, SOLUTION SUBCUTANEOUS at 08:09

## 2023-07-27 RX ADMIN — INSULIN LISPRO 3 UNITS: 100 INJECTION, SOLUTION INTRAVENOUS; SUBCUTANEOUS at 16:58

## 2023-07-27 RX ADMIN — INSULIN LISPRO 8 UNITS: 100 INJECTION, SOLUTION INTRAVENOUS; SUBCUTANEOUS at 11:57

## 2023-07-27 RX ADMIN — INSULIN LISPRO 5 UNITS: 100 INJECTION, SOLUTION INTRAVENOUS; SUBCUTANEOUS at 23:21

## 2023-07-27 NOTE — PROGRESS NOTES
" [facility]     Psychiatric Progress Note    Patient Name: Alec Medrano  : 1970  MRN: [id number]  Primary Care Physician:  Provider, No Known  Date of admission: 2023    Subjective   Subjective     Patient seen and chart reviewed, discussed with staff.    Chief Complaint: Hypothermia, altered mental status      HPI:     Patient's been increasingly agitated.  He is required 4 doses of Haloperidol over the last 48 hours and 2 doses of quetiapine secondary to agitation and on an as-needed basis.  He also had 1 dose of lorazepam.    Patient grossly irritable and agitated.  He was acting inappropriate sexually last night.  He was noted to be punching walls, throwing things either last night or this morning.  Had a lengthy hospital stay may be agitated over not having a clear disposition.    Is difficult to engage today.  Does not recall who I am and thinks having physical therapist.  Patient with being inappropriate smile on his face during interview.  He is resting comfortably with no acute agitation.  His thoughts are disorganized and tangential.  Continues to be delusional.      Objective   Objective     Vitals:   Temp:  [97.7 øF (36.5 øC)-98.2 øF (36.8 øC)] 97.7 øF (36.5 øC)  Heart Rate:  [72-95] 72  Resp:  [18] 18  BP: (125-140)/(74-76) 125/74          Mental Status Exam:      Appearance:   Well-developed, well-nourished, lying calmly in bed at this time  Reliability:   Poor  Eye Contact:   Good  Concentration/Focus:    Attentive to interview but answers a direct question asked  Behaviors:    Labile  Memory :    Poor  Speech:    Normal rate and volume  Language:   Rambling,, no cursing  Mood :    \"Good\"  Affect:    Slightly expansive  Thought process:    Tangential, disorganized,  Thought Content:    Denies suicidal or homicidal ideation, denies hallucinations and none evident  Insight:   Limited  Judgement:    Impaired      Result Review    Result Review:  I have personally reviewed the results from " the time of this admission to 7/27/2023 12:05 EDT and agree with these findings:  []  Laboratory  []  Microbiology  []  Radiology  []  EKG/Telemetry   []  Cardiology/Vascular   []  Pathology  []  Old records  []  Other:  Most notable findings include:     Lab Results (last 24 hours)       Procedure Component Value Units Date/Time    POC Glucose Once [[id number]]  (Abnormal) Collected: 07/27/23 1137    Specimen: Blood Updated: 07/27/23 1142     Glucose 266 mg/dL      Comment: Serial Number: [id number]:  [id number]       POC Glucose Once [[id number]]  (Abnormal) Collected: 07/27/23 0706    Specimen: Blood Updated: 07/27/23 0707     Glucose 161 mg/dL      Comment: Serial Number: [id number]:  [id number]       POC Glucose Once [[id number]]  (Abnormal) Collected: 07/26/23 2005    Specimen: Blood Updated: 07/26/23 2006     Glucose 293 mg/dL      Comment: Serial Number: [id number]:  [id number]       POC Glucose Once [[id number]]  (Abnormal) Collected: 07/26/23 1634    Specimen: Blood Updated: 07/26/23 1636     Glucose 177 mg/dL      Comment: Serial Number: [id number]:  [id number]                   Medications:   cyanocobalamin, 1,000 mcg, Intramuscular, Q28 Days  divalproex, 750 mg, Oral, BID  enoxaparin, 40 mg, Subcutaneous, Q24H  furosemide, 20 mg, Oral, Daily  [START ON 7/28/2023] insulin detemir, 20 Units, Subcutaneous, Daily  insulin lispro, 3-14 Units, Subcutaneous, 4x Daily AC & at Bedtime  lidocaine, 2 patch, Transdermal, Q24H  mineral oil-hydrophilic petrolatum, 1 application , Topical, 2 times per day  multivitamin with minerals, 1 tablet, Oral, Daily  risperiDONE, 4 mg, Oral, BID  sodium chloride, 10 mL, Intravenous, Q12H  traZODone, 100 mg, Oral, Nightly  vitamin B-12, 1,000 mcg, Oral, Daily          Assessment / Plan       Active Hospital Problems:  Active Hospital Problems    Diagnosis     **Hypothermia        Plan:     Increase Depakote to 750 mg twice daily  Continue quetiapine on an  as-needed basis and increase the dose to 100 mg and changed frequency to every 6 hours.  Will monitor the use of quetiapine, haloperidol, and other as needed medications to determine if change is necessary and Risperdal or need additional medications  Consider Aricept or Namenda for cognitive issues      Disposition:  I expect patient to be discharged .    Part of this note may be an electronic transcription/translation of spoken language to printed text using the Dragon dictation system.         Electronically signed by Chris Green MD, 07/27/23, 12:05 PM EDT.

## 2023-07-27 NOTE — PLAN OF CARE
Goal Outcome Evaluation:  Plan of Care Reviewed With: patient        Progress: no change  Outcome Evaluation: Patient remains alert and oriented x4 with flighty thoughts. Sitter remains at bedside due to elopement risk. Patient can be combative at times but is easily re-directed. Blood glucose monitored. No new issues at this time.

## 2023-07-27 NOTE — PLAN OF CARE
Goal Outcome Evaluation:  Plan of Care Reviewed With: patient        Progress: no change  Outcome Evaluation: Pt has been agitated, irritable, and combative most of the night, administered prn haldol, seroquel and one time order of ativan as ordered. Sitter at bedside. No new issues or new needs noted at this time.
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
## 2023-07-28 LAB
GLUCOSE BLDC GLUCOMTR-MCNC: 132 MG/DL (ref 70–99)
GLUCOSE BLDC GLUCOMTR-MCNC: 141 MG/DL (ref 70–99)
GLUCOSE BLDC GLUCOMTR-MCNC: 185 MG/DL (ref 70–99)
GLUCOSE BLDC GLUCOMTR-MCNC: 207 MG/DL (ref 70–99)

## 2023-07-28 PROCEDURE — 63710000001 INSULIN LISPRO (HUMAN) PER 5 UNITS: Performed by: STUDENT IN AN ORGANIZED HEALTH CARE EDUCATION/TRAINING PROGRAM

## 2023-07-28 PROCEDURE — 25010000002 ENOXAPARIN PER 10 MG: Performed by: INTERNAL MEDICINE

## 2023-07-28 PROCEDURE — 63710000001 INSULIN DETEMIR PER 5 UNITS: Performed by: INTERNAL MEDICINE

## 2023-07-28 PROCEDURE — 25010000002 HALOPERIDOL LACTATE PER 5 MG: Performed by: PHYSICIAN ASSISTANT

## 2023-07-28 PROCEDURE — 99232 SBSQ HOSP IP/OBS MODERATE 35: CPT | Performed by: INTERNAL MEDICINE

## 2023-07-28 PROCEDURE — 82948 REAGENT STRIP/BLOOD GLUCOSE: CPT

## 2023-07-28 RX ORDER — HALOPERIDOL 5 MG/ML
2 INJECTION INTRAMUSCULAR ONCE
Status: COMPLETED | OUTPATIENT
Start: 2023-07-28 | End: 2023-07-28

## 2023-07-28 RX ADMIN — INSULIN DETEMIR 20 UNITS: 100 INJECTION, SOLUTION SUBCUTANEOUS at 08:42

## 2023-07-28 RX ADMIN — RISPERIDONE 4 MG: 2 TABLET ORAL at 08:42

## 2023-07-28 RX ADMIN — FUROSEMIDE 20 MG: 20 TABLET ORAL at 08:42

## 2023-07-28 RX ADMIN — MULTIPLE VITAMINS W/ MINERALS TAB 1 TABLET: TAB at 08:42

## 2023-07-28 RX ADMIN — INSULIN LISPRO 5 UNITS: 100 INJECTION, SOLUTION INTRAVENOUS; SUBCUTANEOUS at 12:03

## 2023-07-28 RX ADMIN — DIVALPROEX SODIUM 750 MG: 250 TABLET, DELAYED RELEASE ORAL at 08:42

## 2023-07-28 RX ADMIN — QUETIAPINE FUMARATE 100 MG: 100 TABLET ORAL at 11:16

## 2023-07-28 RX ADMIN — HALOPERIDOL LACTATE 2 MG: 5 INJECTION, SOLUTION INTRAMUSCULAR at 20:54

## 2023-07-28 RX ADMIN — CYANOCOBALAMIN TAB 500 MCG 1000 MCG: 500 TAB at 08:42

## 2023-07-28 RX ADMIN — QUETIAPINE FUMARATE 100 MG: 100 TABLET ORAL at 20:30

## 2023-07-28 RX ADMIN — INSULIN LISPRO 3 UNITS: 100 INJECTION, SOLUTION INTRAVENOUS; SUBCUTANEOUS at 20:30

## 2023-07-28 RX ADMIN — DIVALPROEX SODIUM 750 MG: 250 TABLET, DELAYED RELEASE ORAL at 20:30

## 2023-07-28 RX ADMIN — RISPERIDONE 4 MG: 2 TABLET ORAL at 20:30

## 2023-07-28 RX ADMIN — ENOXAPARIN SODIUM 40 MG: 100 INJECTION SUBCUTANEOUS at 06:10

## 2023-07-28 RX ADMIN — TRAZODONE HYDROCHLORIDE 100 MG: 100 TABLET ORAL at 20:30
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
## 2023-07-28 NOTE — PLAN OF CARE
Goal Outcome Evaluation:  Plan of Care Reviewed With: patient        Progress: no change  Outcome Evaluation: Patient remains alert and oriented with flighty thoughts and impulsiveness. PRN seroquel given per MAR. Blood glucose monitored. Safety sitter remains at bedside. No new issues at this time.

## 2023-07-29 LAB
ANION GAP SERPL CALCULATED.3IONS-SCNC: 10.9 MMOL/L (ref 5–15)
BASOPHILS # BLD AUTO: 0.07 10*3/MM3 (ref 0–0.2)
BASOPHILS NFR BLD AUTO: 0.6 % (ref 0–1.5)
BUN SERPL-MCNC: 24 MG/DL (ref 6–20)
BUN/CREAT SERPL: 20.9 (ref 7–25)
CALCIUM SPEC-SCNC: 9.1 MG/DL (ref 8.6–10.5)
CHLORIDE SERPL-SCNC: 103 MMOL/L (ref 98–107)
CK SERPL-CCNC: 151 U/L (ref 20–200)
CO2 SERPL-SCNC: 23.1 MMOL/L (ref 22–29)
CREAT SERPL-MCNC: 1.15 MG/DL (ref 0.76–1.27)
D-LACTATE SERPL-SCNC: 2 MMOL/L (ref 0.5–2)
DEPRECATED RDW RBC AUTO: 51.9 FL (ref 37–54)
EGFRCR SERPLBLD CKD-EPI 2021: 76.6 ML/MIN/1.73
EOSINOPHIL # BLD AUTO: 0.31 10*3/MM3 (ref 0–0.4)
EOSINOPHIL NFR BLD AUTO: 2.5 % (ref 0.3–6.2)
ERYTHROCYTE [DISTWIDTH] IN BLOOD BY AUTOMATED COUNT: 15.3 % (ref 12.3–15.4)
GLUCOSE BLDC GLUCOMTR-MCNC: 116 MG/DL (ref 70–99)
GLUCOSE BLDC GLUCOMTR-MCNC: 155 MG/DL (ref 70–99)
GLUCOSE BLDC GLUCOMTR-MCNC: 158 MG/DL (ref 70–99)
GLUCOSE BLDC GLUCOMTR-MCNC: 261 MG/DL (ref 70–99)
GLUCOSE SERPL-MCNC: 222 MG/DL (ref 65–99)
HCT VFR BLD AUTO: 39.3 % (ref 37.5–51)
HGB BLD-MCNC: 12.7 G/DL (ref 13–17.7)
IMM GRANULOCYTES # BLD AUTO: 0.23 10*3/MM3 (ref 0–0.05)
IMM GRANULOCYTES NFR BLD AUTO: 1.8 % (ref 0–0.5)
LYMPHOCYTES # BLD AUTO: 3.84 10*3/MM3 (ref 0.7–3.1)
LYMPHOCYTES NFR BLD AUTO: 30.8 % (ref 19.6–45.3)
MCH RBC QN AUTO: 30.2 PG (ref 26.6–33)
MCHC RBC AUTO-ENTMCNC: 32.3 G/DL (ref 31.5–35.7)
MCV RBC AUTO: 93.3 FL (ref 79–97)
MONOCYTES # BLD AUTO: 1.32 10*3/MM3 (ref 0.1–0.9)
MONOCYTES NFR BLD AUTO: 10.6 % (ref 5–12)
NEUTROPHILS NFR BLD AUTO: 53.7 % (ref 42.7–76)
NEUTROPHILS NFR BLD AUTO: 6.69 10*3/MM3 (ref 1.7–7)
NRBC BLD AUTO-RTO: 0 /100 WBC (ref 0–0.2)
PLATELET # BLD AUTO: 463 10*3/MM3 (ref 140–450)
PMV BLD AUTO: 9.8 FL (ref 6–12)
POTASSIUM SERPL-SCNC: 4.2 MMOL/L (ref 3.5–5.2)
RBC # BLD AUTO: 4.21 10*6/MM3 (ref 4.14–5.8)
SODIUM SERPL-SCNC: 137 MMOL/L (ref 136–145)
WBC NRBC COR # BLD: 12.46 10*3/MM3 (ref 3.4–10.8)

## 2023-07-29 PROCEDURE — 82948 REAGENT STRIP/BLOOD GLUCOSE: CPT

## 2023-07-29 PROCEDURE — 83605 ASSAY OF LACTIC ACID: CPT | Performed by: INTERNAL MEDICINE

## 2023-07-29 PROCEDURE — 25010000002 ZIPRASIDONE MESYLATE PER 10 MG: Performed by: FAMILY MEDICINE

## 2023-07-29 PROCEDURE — 63710000001 INSULIN DETEMIR PER 5 UNITS: Performed by: INTERNAL MEDICINE

## 2023-07-29 PROCEDURE — 80048 BASIC METABOLIC PNL TOTAL CA: CPT | Performed by: INTERNAL MEDICINE

## 2023-07-29 PROCEDURE — 82550 ASSAY OF CK (CPK): CPT | Performed by: INTERNAL MEDICINE

## 2023-07-29 PROCEDURE — 85025 COMPLETE CBC W/AUTO DIFF WBC: CPT | Performed by: INTERNAL MEDICINE

## 2023-07-29 PROCEDURE — 25010000002 HALOPERIDOL LACTATE PER 5 MG: Performed by: PHYSICIAN ASSISTANT

## 2023-07-29 PROCEDURE — 63710000001 INSULIN LISPRO (HUMAN) PER 5 UNITS: Performed by: STUDENT IN AN ORGANIZED HEALTH CARE EDUCATION/TRAINING PROGRAM

## 2023-07-29 PROCEDURE — 25010000002 ENOXAPARIN PER 10 MG: Performed by: INTERNAL MEDICINE

## 2023-07-29 PROCEDURE — 99232 SBSQ HOSP IP/OBS MODERATE 35: CPT | Performed by: INTERNAL MEDICINE

## 2023-07-29 RX ORDER — LORAZEPAM 0.5 MG/1
1 TABLET ORAL EVERY 6 HOURS PRN
Status: DISCONTINUED | OUTPATIENT
Start: 2023-07-29 | End: 2023-08-07 | Stop reason: HOSPADM

## 2023-07-29 RX ORDER — ZIPRASIDONE MESYLATE 20 MG/ML
10 INJECTION, POWDER, LYOPHILIZED, FOR SOLUTION INTRAMUSCULAR ONCE
Status: COMPLETED | OUTPATIENT
Start: 2023-07-29 | End: 2023-07-29

## 2023-07-29 RX ADMIN — INSULIN LISPRO 3 UNITS: 100 INJECTION, SOLUTION INTRAVENOUS; SUBCUTANEOUS at 09:46

## 2023-07-29 RX ADMIN — QUETIAPINE FUMARATE 100 MG: 100 TABLET ORAL at 09:46

## 2023-07-29 RX ADMIN — TRAZODONE HYDROCHLORIDE 100 MG: 100 TABLET ORAL at 20:31

## 2023-07-29 RX ADMIN — DIVALPROEX SODIUM 750 MG: 250 TABLET, DELAYED RELEASE ORAL at 20:31

## 2023-07-29 RX ADMIN — RISPERIDONE 4 MG: 2 TABLET ORAL at 20:31

## 2023-07-29 RX ADMIN — FUROSEMIDE 20 MG: 20 TABLET ORAL at 09:46

## 2023-07-29 RX ADMIN — ENOXAPARIN SODIUM 40 MG: 100 INJECTION SUBCUTANEOUS at 09:46

## 2023-07-29 RX ADMIN — ZIPRASIDONE MESYLATE 10 MG: 20 INJECTION, POWDER, LYOPHILIZED, FOR SOLUTION INTRAMUSCULAR at 01:16

## 2023-07-29 RX ADMIN — LORAZEPAM 1 MG: 0.5 TABLET ORAL at 20:31

## 2023-07-29 RX ADMIN — INSULIN DETEMIR 20 UNITS: 100 INJECTION, SOLUTION SUBCUTANEOUS at 09:46

## 2023-07-29 RX ADMIN — INSULIN LISPRO 5 UNITS: 100 INJECTION, SOLUTION INTRAVENOUS; SUBCUTANEOUS at 20:32

## 2023-07-29 RX ADMIN — LIDOCAINE 2 PATCH: 700 PATCH TOPICAL at 09:45

## 2023-07-29 RX ADMIN — DIVALPROEX SODIUM 750 MG: 250 TABLET, DELAYED RELEASE ORAL at 09:46

## 2023-07-29 RX ADMIN — INSULIN LISPRO 3 UNITS: 100 INJECTION, SOLUTION INTRAVENOUS; SUBCUTANEOUS at 13:36

## 2023-07-29 RX ADMIN — MULTIPLE VITAMINS W/ MINERALS TAB 1 TABLET: TAB at 09:46

## 2023-07-29 RX ADMIN — RISPERIDONE 4 MG: 2 TABLET ORAL at 09:46

## 2023-07-29 RX ADMIN — WHITE PETROLATUM 1 APPLICATION: 1.75 OINTMENT TOPICAL at 10:05

## 2023-07-29 RX ADMIN — HALOPERIDOL LACTATE 1 MG: 5 INJECTION, SOLUTION INTRAMUSCULAR at 05:50

## 2023-07-29 RX ADMIN — CYANOCOBALAMIN TAB 500 MCG 1000 MCG: 500 TAB at 09:46
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
## 2023-07-30 LAB
ANION GAP SERPL CALCULATED.3IONS-SCNC: 10.5 MMOL/L (ref 5–15)
BUN SERPL-MCNC: 21 MG/DL (ref 6–20)
BUN/CREAT SERPL: 18.8 (ref 7–25)
CALCIUM SPEC-SCNC: 9.2 MG/DL (ref 8.6–10.5)
CHLORIDE SERPL-SCNC: 105 MMOL/L (ref 98–107)
CO2 SERPL-SCNC: 24.5 MMOL/L (ref 22–29)
CREAT SERPL-MCNC: 1.12 MG/DL (ref 0.76–1.27)
DEPRECATED RDW RBC AUTO: 52.5 FL (ref 37–54)
EGFRCR SERPLBLD CKD-EPI 2021: 79 ML/MIN/1.73
ERYTHROCYTE [DISTWIDTH] IN BLOOD BY AUTOMATED COUNT: 15.3 % (ref 12.3–15.4)
GLUCOSE BLDC GLUCOMTR-MCNC: 128 MG/DL (ref 70–99)
GLUCOSE BLDC GLUCOMTR-MCNC: 143 MG/DL (ref 70–99)
GLUCOSE BLDC GLUCOMTR-MCNC: 167 MG/DL (ref 70–99)
GLUCOSE BLDC GLUCOMTR-MCNC: 246 MG/DL (ref 70–99)
GLUCOSE SERPL-MCNC: 157 MG/DL (ref 65–99)
HCT VFR BLD AUTO: 39.4 % (ref 37.5–51)
HGB BLD-MCNC: 12.8 G/DL (ref 13–17.7)
MAGNESIUM SERPL-MCNC: 1.9 MG/DL (ref 1.6–2.6)
MCH RBC QN AUTO: 30.2 PG (ref 26.6–33)
MCHC RBC AUTO-ENTMCNC: 32.5 G/DL (ref 31.5–35.7)
MCV RBC AUTO: 92.9 FL (ref 79–97)
PLATELET # BLD AUTO: 476 10*3/MM3 (ref 140–450)
PMV BLD AUTO: 10.2 FL (ref 6–12)
POTASSIUM SERPL-SCNC: 4.5 MMOL/L (ref 3.5–5.2)
RBC # BLD AUTO: 4.24 10*6/MM3 (ref 4.14–5.8)
SODIUM SERPL-SCNC: 140 MMOL/L (ref 136–145)
WBC NRBC COR # BLD: 12.79 10*3/MM3 (ref 3.4–10.8)

## 2023-07-30 PROCEDURE — 83735 ASSAY OF MAGNESIUM: CPT | Performed by: INTERNAL MEDICINE

## 2023-07-30 PROCEDURE — 82948 REAGENT STRIP/BLOOD GLUCOSE: CPT

## 2023-07-30 PROCEDURE — 99232 SBSQ HOSP IP/OBS MODERATE 35: CPT | Performed by: INTERNAL MEDICINE

## 2023-07-30 PROCEDURE — 63710000001 INSULIN LISPRO (HUMAN) PER 5 UNITS: Performed by: STUDENT IN AN ORGANIZED HEALTH CARE EDUCATION/TRAINING PROGRAM

## 2023-07-30 PROCEDURE — 80048 BASIC METABOLIC PNL TOTAL CA: CPT | Performed by: INTERNAL MEDICINE

## 2023-07-30 PROCEDURE — 85027 COMPLETE CBC AUTOMATED: CPT | Performed by: INTERNAL MEDICINE

## 2023-07-30 PROCEDURE — 63710000001 INSULIN DETEMIR PER 5 UNITS: Performed by: INTERNAL MEDICINE

## 2023-07-30 RX ADMIN — MULTIPLE VITAMINS W/ MINERALS TAB 1 TABLET: TAB at 11:49

## 2023-07-30 RX ADMIN — INSULIN LISPRO 5 UNITS: 100 INJECTION, SOLUTION INTRAVENOUS; SUBCUTANEOUS at 11:55

## 2023-07-30 RX ADMIN — RISPERIDONE 4 MG: 2 TABLET ORAL at 20:41

## 2023-07-30 RX ADMIN — CYANOCOBALAMIN TAB 500 MCG 1000 MCG: 500 TAB at 11:49

## 2023-07-30 RX ADMIN — INSULIN DETEMIR 20 UNITS: 100 INJECTION, SOLUTION SUBCUTANEOUS at 11:49

## 2023-07-30 RX ADMIN — INSULIN LISPRO 3 UNITS: 100 INJECTION, SOLUTION INTRAVENOUS; SUBCUTANEOUS at 20:48

## 2023-07-30 RX ADMIN — DIVALPROEX SODIUM 750 MG: 250 TABLET, DELAYED RELEASE ORAL at 20:40

## 2023-07-30 RX ADMIN — FUROSEMIDE 20 MG: 20 TABLET ORAL at 11:49

## 2023-07-30 RX ADMIN — LORAZEPAM 1 MG: 0.5 TABLET ORAL at 11:49

## 2023-07-30 RX ADMIN — TRAZODONE HYDROCHLORIDE 100 MG: 100 TABLET ORAL at 20:41

## 2023-07-30 RX ADMIN — LORAZEPAM 1 MG: 0.5 TABLET ORAL at 20:40

## 2023-07-30 RX ADMIN — DIVALPROEX SODIUM 750 MG: 250 TABLET, DELAYED RELEASE ORAL at 11:49

## 2023-07-30 RX ADMIN — RISPERIDONE 4 MG: 2 TABLET ORAL at 11:49

## 2023-07-30 RX ADMIN — WHITE PETROLATUM 1 APPLICATION: 1.75 OINTMENT TOPICAL at 11:57

## 2023-07-30 RX ADMIN — LIDOCAINE 2 PATCH: 700 PATCH TOPICAL at 11:56

## 2023-07-30 NOTE — PLAN OF CARE
Goal Outcome Evaluation:           Progress: improving  Outcome Evaluation: Patient presents with flight ideas and grandious delusions. At times he believes he is either a famous  or Cy Freddy. Patient able to be redirected this shift, no complaints of pain. No need for restraints or security to be called. Patient taking ativan to help with anxiety per order. VSS

## 2023-07-30 NOTE — PLAN OF CARE
Goal Outcome Evaluation:  Plan of Care Reviewed With: patient        Progress: no change  Outcome Evaluation: Pt remained A&Ox1 to self with flight of ideas and impulsiveness. Also, pt remained on room air maintaining stable oxygen saturation. Pt was placed into restraints this AM and removed this afternoon. Family notified. Sitter remained at bedside after restraints removed. Pt was medicated with PRN Seroquel. Blood glucose readings were 222, 158, and 116. All othe vital signs remained stable.
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
## 2023-07-31 LAB
GLUCOSE BLDC GLUCOMTR-MCNC: 159 MG/DL (ref 70–99)
GLUCOSE BLDC GLUCOMTR-MCNC: 161 MG/DL (ref 70–99)
GLUCOSE BLDC GLUCOMTR-MCNC: 189 MG/DL (ref 70–99)
GLUCOSE BLDC GLUCOMTR-MCNC: 217 MG/DL (ref 70–99)

## 2023-07-31 PROCEDURE — 63710000001 INSULIN LISPRO (HUMAN) PER 5 UNITS: Performed by: STUDENT IN AN ORGANIZED HEALTH CARE EDUCATION/TRAINING PROGRAM

## 2023-07-31 PROCEDURE — 82948 REAGENT STRIP/BLOOD GLUCOSE: CPT

## 2023-07-31 PROCEDURE — 25010000002 ENOXAPARIN PER 10 MG: Performed by: INTERNAL MEDICINE

## 2023-07-31 PROCEDURE — 99232 SBSQ HOSP IP/OBS MODERATE 35: CPT | Performed by: INTERNAL MEDICINE

## 2023-07-31 PROCEDURE — 63710000001 INSULIN DETEMIR PER 5 UNITS: Performed by: INTERNAL MEDICINE

## 2023-07-31 RX ORDER — MIRTAZAPINE 15 MG/1
15 TABLET, FILM COATED ORAL NIGHTLY
Status: DISCONTINUED | OUTPATIENT
Start: 2023-07-31 | End: 2023-08-07 | Stop reason: HOSPADM

## 2023-07-31 RX ADMIN — DIVALPROEX SODIUM 750 MG: 250 TABLET, DELAYED RELEASE ORAL at 10:34

## 2023-07-31 RX ADMIN — ENOXAPARIN SODIUM 40 MG: 100 INJECTION SUBCUTANEOUS at 08:30

## 2023-07-31 RX ADMIN — DIVALPROEX SODIUM 750 MG: 250 TABLET, DELAYED RELEASE ORAL at 21:49

## 2023-07-31 RX ADMIN — INSULIN LISPRO 3 UNITS: 100 INJECTION, SOLUTION INTRAVENOUS; SUBCUTANEOUS at 17:34

## 2023-07-31 RX ADMIN — RISPERIDONE 4 MG: 2 TABLET ORAL at 21:49

## 2023-07-31 RX ADMIN — INSULIN LISPRO 3 UNITS: 100 INJECTION, SOLUTION INTRAVENOUS; SUBCUTANEOUS at 08:29

## 2023-07-31 RX ADMIN — INSULIN LISPRO 3 UNITS: 100 INJECTION, SOLUTION INTRAVENOUS; SUBCUTANEOUS at 12:50

## 2023-07-31 RX ADMIN — WHITE PETROLATUM 1 APPLICATION: 1.75 OINTMENT TOPICAL at 10:35

## 2023-07-31 RX ADMIN — WHITE PETROLATUM 1 APPLICATION: 1.75 OINTMENT TOPICAL at 00:00

## 2023-07-31 RX ADMIN — MULTIPLE VITAMINS W/ MINERALS TAB 1 TABLET: TAB at 08:28

## 2023-07-31 RX ADMIN — RISPERIDONE 4 MG: 2 TABLET ORAL at 08:29

## 2023-07-31 RX ADMIN — FUROSEMIDE 20 MG: 20 TABLET ORAL at 08:30

## 2023-07-31 RX ADMIN — MIRTAZAPINE 15 MG: 15 TABLET, FILM COATED ORAL at 21:50

## 2023-07-31 RX ADMIN — LORAZEPAM 1 MG: 0.5 TABLET ORAL at 03:41

## 2023-07-31 RX ADMIN — TRAZODONE HYDROCHLORIDE 100 MG: 100 TABLET ORAL at 21:50

## 2023-07-31 RX ADMIN — WHITE PETROLATUM 1 APPLICATION: 1.75 OINTMENT TOPICAL at 21:51

## 2023-07-31 RX ADMIN — CYANOCOBALAMIN TAB 500 MCG 1000 MCG: 500 TAB at 08:29

## 2023-07-31 RX ADMIN — INSULIN DETEMIR 25 UNITS: 100 INJECTION, SOLUTION SUBCUTANEOUS at 08:29

## 2023-07-31 RX ADMIN — INSULIN LISPRO 3 UNITS: 100 INJECTION, SOLUTION INTRAVENOUS; SUBCUTANEOUS at 21:50

## 2023-07-31 NOTE — PLAN OF CARE
Goal Outcome Evaluation:  Plan of Care Reviewed With: patient        Progress: no change  Outcome Evaluation: patient is alert and oriented but does have illogical thoughts. patient easily reoriented. safety watch remains at bedside. no other changes at this time.
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
## 2023-07-31 NOTE — PLAN OF CARE
Goal Outcome Evaluation:  Plan of Care Reviewed With: patient        Progress: no change  Outcome Evaluation: Pt continues to have a safety sitter. Pt reoriented and educated throughout the night. Skin care provided. No new issues/needs noted at this time.

## 2023-07-31 NOTE — PROGRESS NOTES
" [facility]     Psychiatric Progress Note    Patient Name: Alec Medrano  : 1970  MRN: [id number]  Primary Care Physician:  Provider, No Known  Date of admission: 2023    Subjective   Subjective     Patient seen and chart reviewed, discussed with staff.    Chief Complaint: Altered mental status      HPI:     Patient was in restraints over the weekend but is now.  His sitter today states that he has been redirectable.  Got upset when he could not go down the elevator.  He also expressed to me that he would like to go outside.  Patient had a lengthy hospital stay and suspect he is getting frustrated with being here for so long.  Has possible intellectual impairment that may make it hard for him to fully process and understand that he cannot go outside.    Patient today is sitting up on the side of his bed eating.  He is pleasant and cooperative.  He is fully oriented to day, date, month, and place.  Patient states he has been sleeping well.  Reports he had a sister from this visit that went well.  Reports his mood has been good.  Denies any suicidal or homicidal ideation.    Have required lorazepam as needed for anxiety and restlessness      Objective   Objective     Vitals:   Temp:  [97.9 øF (36.6 øC)-98.3 øF (36.8 øC)] 97.9 øF (36.6 øC)  Heart Rate:  [71-87] 73  Resp:  [18-20] 18  BP: (111-122)/(59-71) 122/68  Flow (L/min):  [0] 0          Mental Status Exam:      Appearance:   Well-developed, well-nourished, ADLs well attended to, sitting up calmly outside of bed  Reliability:   Fair  Eye Contact:   Good  Concentration/Focus:    Attentive to the interview, answers all questions  Behaviors:    Continues to have some mood lability at times, but is easily redirectable today  Memory :    Grossly intact  Speech:    Normal rate and volume  Language:   Appropriate, relevant  Mood :    \"It is good\"  Affect:    Euthymic  Thought process:    Simple, goal directed  Thought Content:    Denies suicidal or homicidal " ideation, no acute hallucinations  Insight:   Limited  Judgement:    Currently appropriate, has periods of mood lability      Result Review    Result Review:  I have personally reviewed the results from the time of this admission to 7/31/2023 12:36 EDT and agree with these findings:  []  Laboratory  []  Microbiology  []  Radiology  []  EKG/Telemetry   []  Cardiology/Vascular   []  Pathology  []  Old records  []  Other:  Most notable findings include:     Lab Results (last 24 hours)       Procedure Component Value Units Date/Time    POC Glucose Once [[id number]]  (Abnormal) Collected: 07/31/23 1221    Specimen: Blood Updated: 07/31/23 1223     Glucose 159 mg/dL      Comment: Serial Number: [id number]:  [id number]       POC Glucose Once [[id number]]  (Abnormal) Collected: 07/31/23 0758    Specimen: Blood Updated: 07/31/23 0759     Glucose 161 mg/dL      Comment: Serial Number: [id number]:  [id number]       POC Glucose Once [[id number]]  (Abnormal) Collected: 07/30/23 2039    Specimen: Blood Updated: 07/30/23 2059     Glucose 167 mg/dL      Comment: Serial Number: [id number]:  [id number]       POC Glucose Once [[id number]]  (Abnormal) Collected: 07/30/23 1731    Specimen: Blood Updated: 07/30/23 1743     Glucose 143 mg/dL      Comment: Serial Number: [id number]:  [id number]                   Medications:   cyanocobalamin, 1,000 mcg, Intramuscular, Q28 Days  divalproex, 750 mg, Oral, BID  enoxaparin, 40 mg, Subcutaneous, Q24H  furosemide, 20 mg, Oral, Daily  insulin detemir, 25 Units, Subcutaneous, Daily  insulin lispro, 3-14 Units, Subcutaneous, 4x Daily AC & at Bedtime  lidocaine, 2 patch, Transdermal, Q24H  mineral oil-hydrophilic petrolatum, 1 application , Topical, 2 times per day  mirtazapine, 15 mg, Oral, Nightly  multivitamin with minerals, 1 tablet, Oral, Daily  risperiDONE, 4 mg, Oral, BID  sodium chloride, 10 mL, Intravenous, Q12H  traZODone, 100 mg, Oral, Nightly  vitamin B-12, 1,000 mcg,  Oral, Daily          Assessment / Plan       Active Hospital Problems:  Active Hospital Problems    Diagnosis     **Hypothermia        Plan:     Add mirtazapine 15 mg at bedtime for anxiety  Continue other meds as ordered  Will get Depakote level check on the labs of morning including CBC and CMP  Work on mood stabilization and abatement of any suicidal ideation or psychosis.  Work on appropriate safety plan  Continue supportive therapy  Patient to engage in all group and individual treatment modalities available on the unit  Obtain collateral information if possible  Titrate medications as clinically indicated  Work on appropriate disposition follow-up including referrals to substance abuse treatment if indicated      Disposition:  I expect patient to be discharged .    Part of this note may be an electronic transcription/translation of spoken language to printed text using the Dragon dictation system.         Electronically signed by Chris Green MD, 07/31/23, 12:36 PM EDT.

## 2023-08-01 LAB
ALBUMIN SERPL-MCNC: 3 G/DL (ref 3.5–5.2)
ALBUMIN/GLOB SERPL: 0.9 G/DL
ALP SERPL-CCNC: 119 U/L (ref 39–117)
ALT SERPL W P-5'-P-CCNC: 6 U/L (ref 1–41)
ANION GAP SERPL CALCULATED.3IONS-SCNC: 10.8 MMOL/L (ref 5–15)
AST SERPL-CCNC: 13 U/L (ref 1–40)
BASOPHILS # BLD AUTO: 0.05 10*3/MM3 (ref 0–0.2)
BASOPHILS NFR BLD AUTO: 0.3 % (ref 0–1.5)
BILIRUB SERPL-MCNC: <0.2 MG/DL (ref 0–1.2)
BUN SERPL-MCNC: 19 MG/DL (ref 6–20)
BUN/CREAT SERPL: 14 (ref 7–25)
CALCIUM SPEC-SCNC: 9.2 MG/DL (ref 8.6–10.5)
CHLORIDE SERPL-SCNC: 105 MMOL/L (ref 98–107)
CO2 SERPL-SCNC: 24.2 MMOL/L (ref 22–29)
CREAT SERPL-MCNC: 1.36 MG/DL (ref 0.76–1.27)
DEPRECATED RDW RBC AUTO: 53.2 FL (ref 37–54)
EGFRCR SERPLBLD CKD-EPI 2021: 62.6 ML/MIN/1.73
EOSINOPHIL # BLD AUTO: 0.33 10*3/MM3 (ref 0–0.4)
EOSINOPHIL NFR BLD AUTO: 2.1 % (ref 0.3–6.2)
ERYTHROCYTE [DISTWIDTH] IN BLOOD BY AUTOMATED COUNT: 15.1 % (ref 12.3–15.4)
GLOBULIN UR ELPH-MCNC: 3.5 GM/DL
GLUCOSE BLDC GLUCOMTR-MCNC: 161 MG/DL (ref 70–99)
GLUCOSE BLDC GLUCOMTR-MCNC: 182 MG/DL (ref 70–99)
GLUCOSE BLDC GLUCOMTR-MCNC: 184 MG/DL (ref 70–99)
GLUCOSE BLDC GLUCOMTR-MCNC: 191 MG/DL (ref 70–99)
GLUCOSE SERPL-MCNC: 249 MG/DL (ref 65–99)
HCT VFR BLD AUTO: 40.8 % (ref 37.5–51)
HGB BLD-MCNC: 13 G/DL (ref 13–17.7)
IMM GRANULOCYTES # BLD AUTO: 0.82 10*3/MM3 (ref 0–0.05)
IMM GRANULOCYTES NFR BLD AUTO: 5.2 % (ref 0–0.5)
LYMPHOCYTES # BLD AUTO: 4.39 10*3/MM3 (ref 0.7–3.1)
LYMPHOCYTES NFR BLD AUTO: 28 % (ref 19.6–45.3)
MCH RBC QN AUTO: 30.5 PG (ref 26.6–33)
MCHC RBC AUTO-ENTMCNC: 31.9 G/DL (ref 31.5–35.7)
MCV RBC AUTO: 95.8 FL (ref 79–97)
MONOCYTES # BLD AUTO: 1.9 10*3/MM3 (ref 0.1–0.9)
MONOCYTES NFR BLD AUTO: 12.1 % (ref 5–12)
NEUTROPHILS NFR BLD AUTO: 52.3 % (ref 42.7–76)
NEUTROPHILS NFR BLD AUTO: 8.21 10*3/MM3 (ref 1.7–7)
NRBC BLD AUTO-RTO: 0 /100 WBC (ref 0–0.2)
PLATELET # BLD AUTO: 492 10*3/MM3 (ref 140–450)
PMV BLD AUTO: 10.1 FL (ref 6–12)
POTASSIUM SERPL-SCNC: 4.1 MMOL/L (ref 3.5–5.2)
PROT SERPL-MCNC: 6.5 G/DL (ref 6–8.5)
RBC # BLD AUTO: 4.26 10*6/MM3 (ref 4.14–5.8)
RBC MORPH BLD: NORMAL
SMALL PLATELETS BLD QL SMEAR: ADEQUATE
SODIUM SERPL-SCNC: 140 MMOL/L (ref 136–145)
VALPROATE SERPL-MCNC: 48 MCG/ML (ref 50–125)
WBC MORPH BLD: NORMAL
WBC NRBC COR # BLD: 15.7 10*3/MM3 (ref 3.4–10.8)

## 2023-08-01 PROCEDURE — 63710000001 INSULIN DETEMIR PER 5 UNITS: Performed by: INTERNAL MEDICINE

## 2023-08-01 PROCEDURE — 82948 REAGENT STRIP/BLOOD GLUCOSE: CPT

## 2023-08-01 PROCEDURE — 63710000001 INSULIN LISPRO (HUMAN) PER 5 UNITS: Performed by: INTERNAL MEDICINE

## 2023-08-01 PROCEDURE — 85025 COMPLETE CBC W/AUTO DIFF WBC: CPT | Performed by: PSYCHIATRY & NEUROLOGY

## 2023-08-01 PROCEDURE — 25010000002 ENOXAPARIN PER 10 MG: Performed by: INTERNAL MEDICINE

## 2023-08-01 PROCEDURE — 80164 ASSAY DIPROPYLACETIC ACD TOT: CPT | Performed by: PSYCHIATRY & NEUROLOGY

## 2023-08-01 PROCEDURE — 80053 COMPREHEN METABOLIC PANEL: CPT | Performed by: PSYCHIATRY & NEUROLOGY

## 2023-08-01 PROCEDURE — 63710000001 INSULIN LISPRO (HUMAN) PER 5 UNITS: Performed by: STUDENT IN AN ORGANIZED HEALTH CARE EDUCATION/TRAINING PROGRAM

## 2023-08-01 PROCEDURE — 99232 SBSQ HOSP IP/OBS MODERATE 35: CPT | Performed by: INTERNAL MEDICINE

## 2023-08-01 PROCEDURE — 85007 BL SMEAR W/DIFF WBC COUNT: CPT | Performed by: PSYCHIATRY & NEUROLOGY

## 2023-08-01 RX ORDER — INSULIN LISPRO 100 [IU]/ML
5 INJECTION, SOLUTION INTRAVENOUS; SUBCUTANEOUS
Status: DISCONTINUED | OUTPATIENT
Start: 2023-08-01 | End: 2023-08-06

## 2023-08-01 RX ADMIN — INSULIN LISPRO 5 UNITS: 100 INJECTION, SOLUTION INTRAVENOUS; SUBCUTANEOUS at 12:16

## 2023-08-01 RX ADMIN — INSULIN LISPRO 5 UNITS: 100 INJECTION, SOLUTION INTRAVENOUS; SUBCUTANEOUS at 08:51

## 2023-08-01 RX ADMIN — LIDOCAINE 2 PATCH: 700 PATCH TOPICAL at 08:51

## 2023-08-01 RX ADMIN — RISPERIDONE 4 MG: 2 TABLET ORAL at 22:10

## 2023-08-01 RX ADMIN — FUROSEMIDE 20 MG: 20 TABLET ORAL at 08:51

## 2023-08-01 RX ADMIN — INSULIN LISPRO 3 UNITS: 100 INJECTION, SOLUTION INTRAVENOUS; SUBCUTANEOUS at 22:12

## 2023-08-01 RX ADMIN — RISPERIDONE 4 MG: 2 TABLET ORAL at 08:51

## 2023-08-01 RX ADMIN — DIVALPROEX SODIUM 750 MG: 250 TABLET, DELAYED RELEASE ORAL at 22:10

## 2023-08-01 RX ADMIN — INSULIN LISPRO 3 UNITS: 100 INJECTION, SOLUTION INTRAVENOUS; SUBCUTANEOUS at 12:19

## 2023-08-01 RX ADMIN — INSULIN LISPRO 5 UNITS: 100 INJECTION, SOLUTION INTRAVENOUS; SUBCUTANEOUS at 17:01

## 2023-08-01 RX ADMIN — WHITE PETROLATUM 1 APPLICATION: 1.75 OINTMENT TOPICAL at 09:03

## 2023-08-01 RX ADMIN — INSULIN LISPRO 3 UNITS: 100 INJECTION, SOLUTION INTRAVENOUS; SUBCUTANEOUS at 08:52

## 2023-08-01 RX ADMIN — INSULIN LISPRO 5 UNITS: 100 INJECTION, SOLUTION INTRAVENOUS; SUBCUTANEOUS at 12:18

## 2023-08-01 RX ADMIN — INSULIN LISPRO 3 UNITS: 100 INJECTION, SOLUTION INTRAVENOUS; SUBCUTANEOUS at 17:00

## 2023-08-01 RX ADMIN — TRAZODONE HYDROCHLORIDE 100 MG: 100 TABLET ORAL at 22:10

## 2023-08-01 RX ADMIN — WHITE PETROLATUM 1 APPLICATION: 1.75 OINTMENT TOPICAL at 22:10

## 2023-08-01 RX ADMIN — CYANOCOBALAMIN TAB 500 MCG 1000 MCG: 500 TAB at 08:50

## 2023-08-01 RX ADMIN — MULTIPLE VITAMINS W/ MINERALS TAB 1 TABLET: TAB at 08:51

## 2023-08-01 RX ADMIN — MIRTAZAPINE 15 MG: 15 TABLET, FILM COATED ORAL at 22:09

## 2023-08-01 RX ADMIN — DIVALPROEX SODIUM 750 MG: 250 TABLET, DELAYED RELEASE ORAL at 08:50

## 2023-08-01 RX ADMIN — INSULIN DETEMIR 30 UNITS: 100 INJECTION, SOLUTION SUBCUTANEOUS at 08:51
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
## 2023-08-02 LAB
GLUCOSE BLDC GLUCOMTR-MCNC: 161 MG/DL (ref 70–99)
GLUCOSE BLDC GLUCOMTR-MCNC: 204 MG/DL (ref 70–99)
GLUCOSE BLDC GLUCOMTR-MCNC: 206 MG/DL (ref 70–99)
GLUCOSE BLDC GLUCOMTR-MCNC: 235 MG/DL (ref 70–99)

## 2023-08-02 PROCEDURE — 63710000001 INSULIN DETEMIR PER 5 UNITS: Performed by: INTERNAL MEDICINE

## 2023-08-02 PROCEDURE — 63710000001 INSULIN LISPRO (HUMAN) PER 5 UNITS: Performed by: INTERNAL MEDICINE

## 2023-08-02 PROCEDURE — 63710000001 INSULIN LISPRO (HUMAN) PER 5 UNITS: Performed by: STUDENT IN AN ORGANIZED HEALTH CARE EDUCATION/TRAINING PROGRAM

## 2023-08-02 PROCEDURE — 99232 SBSQ HOSP IP/OBS MODERATE 35: CPT | Performed by: INTERNAL MEDICINE

## 2023-08-02 PROCEDURE — 25010000002 ENOXAPARIN PER 10 MG: Performed by: INTERNAL MEDICINE

## 2023-08-02 PROCEDURE — 82948 REAGENT STRIP/BLOOD GLUCOSE: CPT

## 2023-08-02 RX ADMIN — MIRTAZAPINE 15 MG: 15 TABLET, FILM COATED ORAL at 20:45

## 2023-08-02 RX ADMIN — MULTIPLE VITAMINS W/ MINERALS TAB 1 TABLET: TAB at 08:35

## 2023-08-02 RX ADMIN — RISPERIDONE 4 MG: 2 TABLET ORAL at 08:35

## 2023-08-02 RX ADMIN — LORAZEPAM 1 MG: 0.5 TABLET ORAL at 12:57

## 2023-08-02 RX ADMIN — FUROSEMIDE 20 MG: 20 TABLET ORAL at 08:29

## 2023-08-02 RX ADMIN — WHITE PETROLATUM 1 APPLICATION: 1.75 OINTMENT TOPICAL at 12:58

## 2023-08-02 RX ADMIN — INSULIN LISPRO 5 UNITS: 100 INJECTION, SOLUTION INTRAVENOUS; SUBCUTANEOUS at 13:00

## 2023-08-02 RX ADMIN — ENOXAPARIN SODIUM 40 MG: 100 INJECTION SUBCUTANEOUS at 06:19

## 2023-08-02 RX ADMIN — LORAZEPAM 1 MG: 0.5 TABLET ORAL at 01:04

## 2023-08-02 RX ADMIN — INSULIN LISPRO 5 UNITS: 100 INJECTION, SOLUTION INTRAVENOUS; SUBCUTANEOUS at 20:39

## 2023-08-02 RX ADMIN — CYANOCOBALAMIN TAB 500 MCG 1000 MCG: 500 TAB at 08:35

## 2023-08-02 RX ADMIN — Medication 10 ML: at 08:38

## 2023-08-02 RX ADMIN — INSULIN DETEMIR 30 UNITS: 100 INJECTION, SOLUTION SUBCUTANEOUS at 08:29

## 2023-08-02 RX ADMIN — INSULIN LISPRO 5 UNITS: 100 INJECTION, SOLUTION INTRAVENOUS; SUBCUTANEOUS at 08:32

## 2023-08-02 RX ADMIN — LORAZEPAM 1 MG: 0.5 TABLET ORAL at 19:37

## 2023-08-02 RX ADMIN — DIVALPROEX SODIUM 750 MG: 250 TABLET, DELAYED RELEASE ORAL at 20:44

## 2023-08-02 RX ADMIN — RISPERIDONE 4 MG: 2 TABLET ORAL at 20:45

## 2023-08-02 RX ADMIN — ACETAMINOPHEN 650 MG: 325 TABLET ORAL at 19:37

## 2023-08-02 RX ADMIN — INSULIN DETEMIR 20 UNITS: 100 INJECTION, SOLUTION SUBCUTANEOUS at 21:58

## 2023-08-02 RX ADMIN — INSULIN LISPRO 5 UNITS: 100 INJECTION, SOLUTION INTRAVENOUS; SUBCUTANEOUS at 18:50

## 2023-08-02 RX ADMIN — INSULIN LISPRO 5 UNITS: 100 INJECTION, SOLUTION INTRAVENOUS; SUBCUTANEOUS at 08:27

## 2023-08-02 RX ADMIN — INSULIN LISPRO 3 UNITS: 100 INJECTION, SOLUTION INTRAVENOUS; SUBCUTANEOUS at 18:49

## 2023-08-02 RX ADMIN — TRAZODONE HYDROCHLORIDE 100 MG: 100 TABLET ORAL at 20:45

## 2023-08-02 RX ADMIN — INSULIN LISPRO 3 UNITS: 100 INJECTION, SOLUTION INTRAVENOUS; SUBCUTANEOUS at 12:59

## 2023-08-02 RX ADMIN — DIVALPROEX SODIUM 750 MG: 250 TABLET, DELAYED RELEASE ORAL at 11:37

## 2023-08-02 NOTE — PROGRESS NOTES
" [facility]     Psychiatric Progress Note    Patient Name: Alec Medrano  : 1970  MRN: [id number]  Primary Care Physician:  Provider, No Known  Date of admission: 2023    Subjective   Subjective     Patient seen and chart reviewed, discussed with staff.    Chief Complaint: Hypothermia, delirium      HPI:     Patient today sitting up in bed, cooperative.  He has no acute agitation.  Patient is fully oriented and able to name the year, day of the week, place, month, but does not know the date but only misses it by 1.    had lorazepam this morning at 1 AM    Patient first appears to be significant improvement in his mental status, but his conversation goes on he is noted to be delusional with some grandiosity.  Patient is able to tell me that he has a history of alcohol misuse but denies having used any alcohol any time recently.  He participates in interview.  There is no acute agitation.          Objective   Objective     Vitals:   Temp:  [97.7 øF (36.5 øC)-98.3 øF (36.8 øC)] 97.7 øF (36.5 øC)  Heart Rate:  [76-88] 76  Resp:  [18-20] 20  BP: (100-154)/(66-87) 100/66          Mental Status Exam:      Appearance:   Well-developed, well-nourished, slightly disheveled  Reliability:   Limited  Eye Contact:   Good  Concentration/Focus:    Attentive to the interview, answers questions and remains focused but answers are often bizarre  Behaviors:    No restlessness or agitation  Memory :    Sensorium intact  Speech:    Fluent, articulate, normal rate and volume  Language:   Appropriate,  Mood :    \"Good\"  Affect:    Congruent with stated mood, euthymic  Thought process:    Grandiose, some slight tangentiality, delusional  Thought Content:    Denies suicidal or homicidal ideation, denies hallucinations and no obvious hallucinations during interview  Insight:   Limited  Judgement:    Can be labile but has been appropriate and not required extra medications yesterday      Result Review    Result Review:  I have " personally reviewed the results from the time of this admission to 8/2/2023 12:03 EDT and agree with these findings:  [x]  Laboratory  []  Microbiology  []  Radiology  []  EKG/Telemetry   []  Cardiology/Vascular   []  Pathology  []  Old records  []  Other:  Most notable findings include: Depakote level acceptable, platelet count normal, no elevation of liver enzymes     Latest Reference Range & Units 08/01/23 05:31   Valproic Acid 50.0 - 125.0 mcg/mL 48.0 (L)   (L): Data is abnormally low    Lab Results (last 24 hours)       Procedure Component Value Units Date/Time    POC Glucose Once [[id number]]  (Abnormal) Collected: 08/02/23 1141    Specimen: Blood Updated: 08/02/23 1147     Glucose 161 mg/dL      Comment: Serial Number: [id number]:  [id number]       POC Glucose Once [[id number]]  (Abnormal) Collected: 08/02/23 0740    Specimen: Blood Updated: 08/02/23 0755     Glucose 206 mg/dL      Comment: Serial Number: [id number]:  [id number]       POC Glucose Once [[id number]]  (Abnormal) Collected: 08/01/23 2143    Specimen: Blood Updated: 08/01/23 2145     Glucose 182 mg/dL      Comment: Serial Number: [id number]:  [id number]       POC Glucose Once [[id number]]  (Abnormal) Collected: 08/01/23 1615    Specimen: Blood Updated: 08/01/23 1618     Glucose 191 mg/dL      Comment: Serial Number: [id number]:  [id number]                   Medications:   cyanocobalamin, 1,000 mcg, Intramuscular, Q28 Days  divalproex, 750 mg, Oral, BID  enoxaparin, 40 mg, Subcutaneous, Q24H  furosemide, 20 mg, Oral, Daily  insulin detemir, 20 Units, Subcutaneous, BID  insulin lispro, 3-14 Units, Subcutaneous, 4x Daily AC & at Bedtime  insulin lispro, 5 Units, Subcutaneous, TID With Meals  lidocaine, 2 patch, Transdermal, Q24H  mineral oil-hydrophilic petrolatum, 1 application , Topical, 2 times per day  mirtazapine, 15 mg, Oral, Nightly  multivitamin with minerals, 1 tablet, Oral, Daily  risperiDONE, 4 mg, Oral, BID  sodium  chloride, 10 mL, Intravenous, Q12H  traZODone, 100 mg, Oral, Nightly  vitamin B-12, 1,000 mcg, Oral, Daily          Assessment / Plan       Active Hospital Problems:  Active Hospital Problems    Diagnosis     **Hypothermia        Plan:     Continue current treatment protocol and titrate medications as clinically indicated  We will follow make treatment recommendations as indicated      Disposition:  I expect patient to be discharged .    Part of this note may be an electronic transcription/translation of spoken language to printed text using the Dragon dictation system.         Electronically signed by Chris Green MD, 08/02/23, 12:03 PM EDT.

## 2023-08-02 NOTE — PLAN OF CARE
Goal Outcome Evaluation:  Plan of Care Reviewed With: patient        Progress: no change  Outcome Evaluation: Pt AOX3, disoriented to situation. Pt reoriented througout shift. Safety watch at bedside. No new issues/needs noted at this time.
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
## 2023-08-03 LAB
ANION GAP SERPL CALCULATED.3IONS-SCNC: 11.3 MMOL/L (ref 5–15)
BUN SERPL-MCNC: 24 MG/DL (ref 6–20)
BUN/CREAT SERPL: 16.7 (ref 7–25)
CALCIUM SPEC-SCNC: 10.1 MG/DL (ref 8.6–10.5)
CHLORIDE SERPL-SCNC: 101 MMOL/L (ref 98–107)
CO2 SERPL-SCNC: 27.7 MMOL/L (ref 22–29)
CREAT SERPL-MCNC: 1.44 MG/DL (ref 0.76–1.27)
DEPRECATED RDW RBC AUTO: 51.8 FL (ref 37–54)
EGFRCR SERPLBLD CKD-EPI 2021: 58.5 ML/MIN/1.73
ERYTHROCYTE [DISTWIDTH] IN BLOOD BY AUTOMATED COUNT: 15.3 % (ref 12.3–15.4)
GLUCOSE BLDC GLUCOMTR-MCNC: 132 MG/DL (ref 70–99)
GLUCOSE BLDC GLUCOMTR-MCNC: 185 MG/DL (ref 70–99)
GLUCOSE BLDC GLUCOMTR-MCNC: 230 MG/DL (ref 70–99)
GLUCOSE SERPL-MCNC: 114 MG/DL (ref 65–99)
HCT VFR BLD AUTO: 44.6 % (ref 37.5–51)
HGB BLD-MCNC: 14.5 G/DL (ref 13–17.7)
MAGNESIUM SERPL-MCNC: 2.1 MG/DL (ref 1.6–2.6)
MCH RBC QN AUTO: 30 PG (ref 26.6–33)
MCHC RBC AUTO-ENTMCNC: 32.5 G/DL (ref 31.5–35.7)
MCV RBC AUTO: 92.1 FL (ref 79–97)
PLATELET # BLD AUTO: 584 10*3/MM3 (ref 140–450)
PMV BLD AUTO: 10.1 FL (ref 6–12)
POTASSIUM SERPL-SCNC: 4.1 MMOL/L (ref 3.5–5.2)
RBC # BLD AUTO: 4.84 10*6/MM3 (ref 4.14–5.8)
SODIUM SERPL-SCNC: 140 MMOL/L (ref 136–145)
WBC NRBC COR # BLD: 17.78 10*3/MM3 (ref 3.4–10.8)

## 2023-08-03 PROCEDURE — 85027 COMPLETE CBC AUTOMATED: CPT | Performed by: INTERNAL MEDICINE

## 2023-08-03 PROCEDURE — 83735 ASSAY OF MAGNESIUM: CPT | Performed by: INTERNAL MEDICINE

## 2023-08-03 PROCEDURE — 99232 SBSQ HOSP IP/OBS MODERATE 35: CPT | Performed by: INTERNAL MEDICINE

## 2023-08-03 PROCEDURE — 63710000001 INSULIN LISPRO (HUMAN) PER 5 UNITS: Performed by: STUDENT IN AN ORGANIZED HEALTH CARE EDUCATION/TRAINING PROGRAM

## 2023-08-03 PROCEDURE — 63710000001 INSULIN DETEMIR PER 5 UNITS: Performed by: INTERNAL MEDICINE

## 2023-08-03 PROCEDURE — 63710000001 INSULIN LISPRO (HUMAN) PER 5 UNITS: Performed by: INTERNAL MEDICINE

## 2023-08-03 PROCEDURE — 82948 REAGENT STRIP/BLOOD GLUCOSE: CPT

## 2023-08-03 PROCEDURE — 80048 BASIC METABOLIC PNL TOTAL CA: CPT | Performed by: INTERNAL MEDICINE

## 2023-08-03 PROCEDURE — 25010000002 HALOPERIDOL LACTATE PER 5 MG: Performed by: PHYSICIAN ASSISTANT

## 2023-08-03 PROCEDURE — 94799 UNLISTED PULMONARY SVC/PX: CPT

## 2023-08-03 RX ADMIN — DIVALPROEX SODIUM 750 MG: 250 TABLET, DELAYED RELEASE ORAL at 20:19

## 2023-08-03 RX ADMIN — HALOPERIDOL LACTATE 1 MG: 5 INJECTION, SOLUTION INTRAMUSCULAR at 12:37

## 2023-08-03 RX ADMIN — INSULIN DETEMIR 20 UNITS: 100 INJECTION, SOLUTION SUBCUTANEOUS at 20:19

## 2023-08-03 RX ADMIN — LIDOCAINE 2 PATCH: 700 PATCH TOPICAL at 09:10

## 2023-08-03 RX ADMIN — INSULIN LISPRO 5 UNITS: 100 INJECTION, SOLUTION INTRAVENOUS; SUBCUTANEOUS at 09:12

## 2023-08-03 RX ADMIN — DIVALPROEX SODIUM 750 MG: 250 TABLET, DELAYED RELEASE ORAL at 09:10

## 2023-08-03 RX ADMIN — RISPERIDONE 4 MG: 2 TABLET ORAL at 09:11

## 2023-08-03 RX ADMIN — INSULIN LISPRO 5 UNITS: 100 INJECTION, SOLUTION INTRAVENOUS; SUBCUTANEOUS at 20:28

## 2023-08-03 RX ADMIN — FUROSEMIDE 20 MG: 20 TABLET ORAL at 09:10

## 2023-08-03 RX ADMIN — INSULIN LISPRO 5 UNITS: 100 INJECTION, SOLUTION INTRAVENOUS; SUBCUTANEOUS at 12:37

## 2023-08-03 RX ADMIN — INSULIN DETEMIR 20 UNITS: 100 INJECTION, SOLUTION SUBCUTANEOUS at 09:09

## 2023-08-03 RX ADMIN — INSULIN LISPRO 3 UNITS: 100 INJECTION, SOLUTION INTRAVENOUS; SUBCUTANEOUS at 12:37

## 2023-08-03 RX ADMIN — HALOPERIDOL LACTATE 1 MG: 5 INJECTION, SOLUTION INTRAMUSCULAR at 22:21

## 2023-08-03 RX ADMIN — MIRTAZAPINE 15 MG: 15 TABLET, FILM COATED ORAL at 20:19

## 2023-08-03 RX ADMIN — TRAZODONE HYDROCHLORIDE 100 MG: 100 TABLET ORAL at 20:19

## 2023-08-03 RX ADMIN — CYANOCOBALAMIN TAB 500 MCG 1000 MCG: 500 TAB at 09:10

## 2023-08-03 RX ADMIN — INSULIN LISPRO 5 UNITS: 100 INJECTION, SOLUTION INTRAVENOUS; SUBCUTANEOUS at 17:53

## 2023-08-03 RX ADMIN — INSULIN LISPRO 5 UNITS: 100 INJECTION, SOLUTION INTRAVENOUS; SUBCUTANEOUS at 17:52

## 2023-08-03 RX ADMIN — WHITE PETROLATUM 1 APPLICATION: 1.75 OINTMENT TOPICAL at 09:11

## 2023-08-03 RX ADMIN — MULTIPLE VITAMINS W/ MINERALS TAB 1 TABLET: TAB at 09:10

## 2023-08-03 RX ADMIN — RISPERIDONE 4 MG: 2 TABLET ORAL at 20:19

## 2023-08-03 RX ADMIN — HALOPERIDOL LACTATE 1 MG: 5 INJECTION, SOLUTION INTRAMUSCULAR at 18:03
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
## 2023-08-03 NOTE — SIGNIFICANT NOTE
08/02/23 1330   Coping/Psychosocial   Observed Emotional State calm;cooperative;happy   Verbalized Emotional State relief;hopefulness   Trust Relationship/Rapport empathic listening provided   Family/Support Persons sibling   Involvement in Care interacting with patient   Additional Documentation Spiritual Care (Group)   Spiritual Care   Use of Spiritual Resources non-Synagogue use of spiritual care   Spiritual Care Source  initiative   Spiritual Care Follow-Up follow-up, none required as presently assessed   Response to Spiritual Care receptive of support;engaged in conversation   Spiritual Care Interventions supportive conversation provided   Spiritual Care Visit Type initial   Receptivity to Spiritual Care visit welcomed

## 2023-08-03 NOTE — PLAN OF CARE
Goal Outcome Evaluation:  Plan of Care Reviewed With: patient        Progress: no change  Outcome Evaluation: Pt was A&Ox2, minus time and situation this shift. Pt was frequently reoriented througout shift. Also, patient remained on room air maintaining stable oxygen saturation. Pt denied pain this shift. Pt was medicated with PRN Hadol to help with agitation. Safty sitter remained at bedside. All othe vital signs remained stable.

## 2023-08-04 LAB
GLUCOSE BLDC GLUCOMTR-MCNC: 167 MG/DL (ref 70–99)
GLUCOSE BLDC GLUCOMTR-MCNC: 173 MG/DL (ref 70–99)
GLUCOSE BLDC GLUCOMTR-MCNC: 211 MG/DL (ref 70–99)

## 2023-08-04 PROCEDURE — 63710000001 INSULIN DETEMIR PER 5 UNITS: Performed by: INTERNAL MEDICINE

## 2023-08-04 PROCEDURE — 82948 REAGENT STRIP/BLOOD GLUCOSE: CPT

## 2023-08-04 PROCEDURE — 63710000001 INSULIN LISPRO (HUMAN) PER 5 UNITS: Performed by: INTERNAL MEDICINE

## 2023-08-04 PROCEDURE — 63710000001 INSULIN LISPRO (HUMAN) PER 5 UNITS: Performed by: STUDENT IN AN ORGANIZED HEALTH CARE EDUCATION/TRAINING PROGRAM

## 2023-08-04 PROCEDURE — 25010000002 HALOPERIDOL LACTATE PER 5 MG: Performed by: PHYSICIAN ASSISTANT

## 2023-08-04 PROCEDURE — 99232 SBSQ HOSP IP/OBS MODERATE 35: CPT | Performed by: INTERNAL MEDICINE

## 2023-08-04 RX ADMIN — RISPERIDONE 4 MG: 2 TABLET ORAL at 09:19

## 2023-08-04 RX ADMIN — HALOPERIDOL LACTATE 1 MG: 5 INJECTION, SOLUTION INTRAMUSCULAR at 07:15

## 2023-08-04 RX ADMIN — INSULIN LISPRO 3 UNITS: 100 INJECTION, SOLUTION INTRAVENOUS; SUBCUTANEOUS at 09:19

## 2023-08-04 RX ADMIN — DIVALPROEX SODIUM 750 MG: 250 TABLET, DELAYED RELEASE ORAL at 09:20

## 2023-08-04 RX ADMIN — FUROSEMIDE 20 MG: 20 TABLET ORAL at 09:19

## 2023-08-04 RX ADMIN — RISPERIDONE 4 MG: 2 TABLET ORAL at 23:38

## 2023-08-04 RX ADMIN — INSULIN LISPRO 5 UNITS: 100 INJECTION, SOLUTION INTRAVENOUS; SUBCUTANEOUS at 17:44

## 2023-08-04 RX ADMIN — TRAZODONE HYDROCHLORIDE 100 MG: 100 TABLET ORAL at 23:38

## 2023-08-04 RX ADMIN — MIRTAZAPINE 15 MG: 15 TABLET, FILM COATED ORAL at 23:38

## 2023-08-04 RX ADMIN — LORAZEPAM 1 MG: 0.5 TABLET ORAL at 23:38

## 2023-08-04 RX ADMIN — INSULIN LISPRO 5 UNITS: 100 INJECTION, SOLUTION INTRAVENOUS; SUBCUTANEOUS at 12:50

## 2023-08-04 RX ADMIN — INSULIN DETEMIR 20 UNITS: 100 INJECTION, SOLUTION SUBCUTANEOUS at 09:18

## 2023-08-04 RX ADMIN — MULTIPLE VITAMINS W/ MINERALS TAB 1 TABLET: TAB at 09:19

## 2023-08-04 RX ADMIN — INSULIN LISPRO 5 UNITS: 100 INJECTION, SOLUTION INTRAVENOUS; SUBCUTANEOUS at 17:45

## 2023-08-04 RX ADMIN — INSULIN LISPRO 5 UNITS: 100 INJECTION, SOLUTION INTRAVENOUS; SUBCUTANEOUS at 09:20

## 2023-08-04 RX ADMIN — WHITE PETROLATUM 1 APPLICATION: 1.75 OINTMENT TOPICAL at 09:21

## 2023-08-04 RX ADMIN — LORAZEPAM 1 MG: 0.5 TABLET ORAL at 12:50

## 2023-08-04 RX ADMIN — INSULIN LISPRO 3 UNITS: 100 INJECTION, SOLUTION INTRAVENOUS; SUBCUTANEOUS at 12:51

## 2023-08-04 RX ADMIN — CYANOCOBALAMIN TAB 500 MCG 1000 MCG: 500 TAB at 09:19

## 2023-08-04 RX ADMIN — DIVALPROEX SODIUM 750 MG: 250 TABLET, DELAYED RELEASE ORAL at 23:38

## 2023-08-04 RX ADMIN — HALOPERIDOL LACTATE 1 MG: 5 INJECTION, SOLUTION INTRAMUSCULAR at 02:27
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
## 2023-08-04 NOTE — PLAN OF CARE
Goal Outcome Evaluation:  Plan of Care Reviewed With: patient        Progress: no change  Outcome Evaluation: Pt remained alert to self and place this shift. Pt had some anxiety and slight agitation at beginning of shift, administered prn anxiety meds as ordered. Safety watch at bedside. Pt remained calmed and rested majority of shift. Placement is still pending at this time. No new issues or new needs noted at this time.

## 2023-08-04 NOTE — PLAN OF CARE
Goal Outcome Evaluation:  Plan of Care Reviewed With: patient        Progress: no change    Pt remained A&Ox2, minus situation and time. Also, pt remained on room air maintaining stable oxygen saturation. Pt denied pain this shift. Pt was medicated with PRN Haldol and Ativan to help relieve aggegation. Skin care completed as per order. Pt tolerated well. Blood glucose readings were 167, 173, and 211. Sliding scale insulin administered as per order, see MAR. All other vital signs remained stable.

## 2023-08-05 LAB
ALBUMIN SERPL-MCNC: 3.1 G/DL (ref 3.5–5.2)
ANION GAP SERPL CALCULATED.3IONS-SCNC: 10 MMOL/L (ref 5–15)
BASOPHILS # BLD AUTO: 0.18 10*3/MM3 (ref 0–0.2)
BASOPHILS NFR BLD AUTO: 1 % (ref 0–1.5)
BUN SERPL-MCNC: 24 MG/DL (ref 6–20)
BUN/CREAT SERPL: 17.1 (ref 7–25)
CALCIUM SPEC-SCNC: 9.2 MG/DL (ref 8.6–10.5)
CHLORIDE SERPL-SCNC: 104 MMOL/L (ref 98–107)
CO2 SERPL-SCNC: 24 MMOL/L (ref 22–29)
CREAT SERPL-MCNC: 1.4 MG/DL (ref 0.76–1.27)
DEPRECATED RDW RBC AUTO: 58.2 FL (ref 37–54)
EGFRCR SERPLBLD CKD-EPI 2021: 60.5 ML/MIN/1.73
EOSINOPHIL # BLD AUTO: 0.32 10*3/MM3 (ref 0–0.4)
EOSINOPHIL NFR BLD AUTO: 1.8 % (ref 0.3–6.2)
ERYTHROCYTE [DISTWIDTH] IN BLOOD BY AUTOMATED COUNT: 15.5 % (ref 12.3–15.4)
GLUCOSE BLDC GLUCOMTR-MCNC: 181 MG/DL (ref 70–99)
GLUCOSE BLDC GLUCOMTR-MCNC: 196 MG/DL (ref 70–99)
GLUCOSE BLDC GLUCOMTR-MCNC: 246 MG/DL (ref 70–99)
GLUCOSE SERPL-MCNC: 220 MG/DL (ref 65–99)
HCT VFR BLD AUTO: 45.6 % (ref 37.5–51)
HGB BLD-MCNC: 13.6 G/DL (ref 13–17.7)
IMM GRANULOCYTES # BLD AUTO: 0.62 10*3/MM3 (ref 0–0.05)
IMM GRANULOCYTES NFR BLD AUTO: 3.5 % (ref 0–0.5)
LYMPHOCYTES # BLD AUTO: 4.39 10*3/MM3 (ref 0.7–3.1)
LYMPHOCYTES NFR BLD AUTO: 24.7 % (ref 19.6–45.3)
MCH RBC QN AUTO: 30.3 PG (ref 26.6–33)
MCHC RBC AUTO-ENTMCNC: 29.8 G/DL (ref 31.5–35.7)
MCV RBC AUTO: 101.6 FL (ref 79–97)
MONOCYTES # BLD AUTO: 2.31 10*3/MM3 (ref 0.1–0.9)
MONOCYTES NFR BLD AUTO: 13 % (ref 5–12)
NEUTROPHILS NFR BLD AUTO: 56 % (ref 42.7–76)
NEUTROPHILS NFR BLD AUTO: 9.97 10*3/MM3 (ref 1.7–7)
NRBC BLD AUTO-RTO: 0 /100 WBC (ref 0–0.2)
PHOSPHATE SERPL-MCNC: 4.4 MG/DL (ref 2.5–4.5)
PLATELET # BLD AUTO: 445 10*3/MM3 (ref 140–450)
PMV BLD AUTO: 9.8 FL (ref 6–12)
POTASSIUM SERPL-SCNC: 4.5 MMOL/L (ref 3.5–5.2)
RBC # BLD AUTO: 4.49 10*6/MM3 (ref 4.14–5.8)
SODIUM SERPL-SCNC: 138 MMOL/L (ref 136–145)
WBC NRBC COR # BLD: 17.79 10*3/MM3 (ref 3.4–10.8)

## 2023-08-05 PROCEDURE — 82948 REAGENT STRIP/BLOOD GLUCOSE: CPT

## 2023-08-05 PROCEDURE — 63710000001 INSULIN DETEMIR PER 5 UNITS: Performed by: INTERNAL MEDICINE

## 2023-08-05 PROCEDURE — 63710000001 INSULIN LISPRO (HUMAN) PER 5 UNITS: Performed by: STUDENT IN AN ORGANIZED HEALTH CARE EDUCATION/TRAINING PROGRAM

## 2023-08-05 PROCEDURE — 63710000001 INSULIN LISPRO (HUMAN) PER 5 UNITS: Performed by: INTERNAL MEDICINE

## 2023-08-05 PROCEDURE — 99232 SBSQ HOSP IP/OBS MODERATE 35: CPT | Performed by: INTERNAL MEDICINE

## 2023-08-05 PROCEDURE — 80069 RENAL FUNCTION PANEL: CPT | Performed by: INTERNAL MEDICINE

## 2023-08-05 PROCEDURE — 25010000002 ENOXAPARIN PER 10 MG: Performed by: INTERNAL MEDICINE

## 2023-08-05 PROCEDURE — 25010000002 HALOPERIDOL LACTATE PER 5 MG: Performed by: PHYSICIAN ASSISTANT

## 2023-08-05 PROCEDURE — 85025 COMPLETE CBC W/AUTO DIFF WBC: CPT | Performed by: INTERNAL MEDICINE

## 2023-08-05 RX ADMIN — RISPERIDONE 4 MG: 2 TABLET ORAL at 20:59

## 2023-08-05 RX ADMIN — LORAZEPAM 1 MG: 0.5 TABLET ORAL at 20:58

## 2023-08-05 RX ADMIN — ENOXAPARIN SODIUM 40 MG: 100 INJECTION SUBCUTANEOUS at 06:32

## 2023-08-05 RX ADMIN — INSULIN LISPRO 5 UNITS: 100 INJECTION, SOLUTION INTRAVENOUS; SUBCUTANEOUS at 17:22

## 2023-08-05 RX ADMIN — INSULIN DETEMIR 20 UNITS: 100 INJECTION, SOLUTION SUBCUTANEOUS at 20:58

## 2023-08-05 RX ADMIN — HALOPERIDOL LACTATE 1 MG: 5 INJECTION, SOLUTION INTRAMUSCULAR at 05:40

## 2023-08-05 RX ADMIN — INSULIN LISPRO 3 UNITS: 100 INJECTION, SOLUTION INTRAVENOUS; SUBCUTANEOUS at 20:58

## 2023-08-05 RX ADMIN — DIVALPROEX SODIUM 750 MG: 250 TABLET, DELAYED RELEASE ORAL at 20:59

## 2023-08-05 RX ADMIN — INSULIN LISPRO 3 UNITS: 100 INJECTION, SOLUTION INTRAVENOUS; SUBCUTANEOUS at 17:22

## 2023-08-05 RX ADMIN — TRAZODONE HYDROCHLORIDE 100 MG: 100 TABLET ORAL at 20:59

## 2023-08-05 RX ADMIN — MIRTAZAPINE 15 MG: 15 TABLET, FILM COATED ORAL at 20:59

## 2023-08-05 NOTE — CONSULTS
"Nutrition Services    Patient Name: Alec Medrano  YOB: 1970  MRN: [id number]  Admission date: 7/9/2023      CLINICAL NUTRITION ASSESSMENT      Reason for Assessment  Follow-up protocol     H&P:    Past Medical History:   Diagnosis Date    COPD (chronic obstructive pulmonary disease)     Diabetes mellitus     Hyperlipidemia     Hypertension     Hypotension     Pneumothorax     Pulmonary emboli     Unresponsive episode         Current Problems:   Active Hospital Problems    Diagnosis     **Hypothermia         Nutrition/Diet History         Narrative     Pt followed by RD for LOS. Pt is at low risk per nutrition risk screening. No acute nutrition concerns or interventions at this time. RD will continue to follow and monitor per protocol.       Anthropometrics        Current Height, Weight Height: 154.9 cm (61\")  Weight: 93.8 kg (206 lb 12.7 oz)   Current BMI Body mass index is 39.07 kg/mý.       Weight Hx  Wt Readings from Last 30 Encounters:   07/09/23 0748 93.8 kg (206 lb 12.7 oz)   07/09/23 0559 93.8 kg (206 lb 12.7 oz)   07/09/23 0550 93.8 kg (206 lb 12.7 oz)            Wt Change Observation Unable to determine     Estimated/Assessed Needs       Energy Requirements 25-30 kcal/kg adj BW (62.7 kg)   EST Needs (kcal/day) 9193-4687 kcal        Protein Requirements 1.0 g/kg adj BW   EST Daily Needs (g/day) 63 g       Fluid Requirements 1 ml/kcal     Estimated Needs (mL/day) 2102-5118 ml      Labs/Medications         Pertinent Labs Reviewed.   Results from last 7 days   Lab Units 08/05/23  0521 08/03/23  1431 08/01/23  0531   SODIUM mmol/L 138 140 140   POTASSIUM mmol/L 4.5 4.1 4.1   CHLORIDE mmol/L 104 101 105   CO2 mmol/L 24.0 27.7 24.2   BUN mg/dL 24* 24* 19   CREATININE mg/dL 1.40* 1.44* 1.36*   CALCIUM mg/dL 9.2 10.1 9.2   BILIRUBIN mg/dL  --   --  <0.2   ALK PHOS U/L  --   --  119*   ALT (SGPT) U/L  --   --  6   AST (SGOT) U/L  --   --  13   GLUCOSE mg/dL 220* 114* 249*       Results from last 7 days "   Lab Units 08/05/23  0521 08/03/23  1431 08/01/23  0531 07/30/23  0515   MAGNESIUM mg/dL  --  2.1  --  1.9   PHOSPHORUS mg/dL 4.4  --   --   --    HEMOGLOBIN g/dL 13.6 14.5   < > 12.8*   HEMATOCRIT % 45.6 44.6   < > 39.4    < > = values in this interval not displayed.       No results found for: COVID19  Lab Results   Component Value Date    HGBA1C 7.70 (H) 07/09/2023         Pertinent Medications Reviewed.     Current Nutrition Orders & Evaluation of Intake       Oral Nutrition     Current PO Diet Diet: Cardiac Diets, Diabetic Diets; Healthy Heart (2-3 Na+); Consistent Carbohydrate; Safe Tray; Texture: Soft to Chew (NDD 3); Soft to Chew: Chopped Meat; Fluid Consistency: Thin (IDDSI 0)   Supplement No active supplement orders       Malnutrition Severity Assessment                Nutrition Diagnosis         Nutrition Dx Problem 1 No nutrition diagnosis at this time.       Nutrition Intervention         No intervention indicated.      Medical Nutrition Therapy/Nutrition Education          Learner     Readiness N/A  N/A     Method     Response N/A  N/A     Monitor/Evaluation        Monitor Per protocol.       Nutrition Discharge Plan         No nutrition needs identified at this time.       Electronically signed by:  Nisa Osborne RD  08/05/23 10:03 EDT
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
## 2023-08-05 NOTE — PLAN OF CARE
Goal Outcome Evaluation:  Plan of Care Reviewed With: patient        Progress: no change  Outcome Evaluation: Pt has refused medications all day until dinner time, refused blood sugar check at lunch. Pt impulsive, flooding bathroom by placing clothes over drain. Pt sexually inappropriate with safety watch-boundaries reinforced. Pt currently in bed, resting comfortably, cooperative and appropriately interacting with staff. Pt in no apparent distress at this time, denies any needs at this time, call light in reach,  at bedside.
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
## 2023-08-06 LAB
GLUCOSE BLDC GLUCOMTR-MCNC: 158 MG/DL (ref 70–99)
GLUCOSE BLDC GLUCOMTR-MCNC: 173 MG/DL (ref 70–99)
GLUCOSE BLDC GLUCOMTR-MCNC: 187 MG/DL (ref 70–99)
GLUCOSE BLDC GLUCOMTR-MCNC: 68 MG/DL (ref 70–99)

## 2023-08-06 PROCEDURE — 99233 SBSQ HOSP IP/OBS HIGH 50: CPT | Performed by: INTERNAL MEDICINE

## 2023-08-06 PROCEDURE — 25010000002 ENOXAPARIN PER 10 MG: Performed by: INTERNAL MEDICINE

## 2023-08-06 PROCEDURE — 82948 REAGENT STRIP/BLOOD GLUCOSE: CPT

## 2023-08-06 RX ORDER — GLIPIZIDE 10 MG/1
10 TABLET ORAL
Status: DISCONTINUED | OUTPATIENT
Start: 2023-08-06 | End: 2023-08-07 | Stop reason: HOSPADM

## 2023-08-06 RX ADMIN — DIVALPROEX SODIUM 750 MG: 250 TABLET, DELAYED RELEASE ORAL at 19:47

## 2023-08-06 RX ADMIN — RISPERIDONE 4 MG: 2 TABLET ORAL at 19:48

## 2023-08-06 RX ADMIN — LORAZEPAM 1 MG: 0.5 TABLET ORAL at 17:09

## 2023-08-06 RX ADMIN — LORAZEPAM 1 MG: 0.5 TABLET ORAL at 10:48

## 2023-08-06 RX ADMIN — MIRTAZAPINE 15 MG: 15 TABLET, FILM COATED ORAL at 19:47

## 2023-08-06 RX ADMIN — CYANOCOBALAMIN TAB 500 MCG 1000 MCG: 500 TAB at 08:00

## 2023-08-06 RX ADMIN — LINAGLIPTIN 5 MG: 5 TABLET, FILM COATED ORAL at 13:56

## 2023-08-06 RX ADMIN — MULTIPLE VITAMINS W/ MINERALS TAB 1 TABLET: TAB at 08:01

## 2023-08-06 RX ADMIN — RISPERIDONE 4 MG: 2 TABLET ORAL at 08:00

## 2023-08-06 RX ADMIN — TRAZODONE HYDROCHLORIDE 100 MG: 100 TABLET ORAL at 19:48

## 2023-08-06 RX ADMIN — DIVALPROEX SODIUM 750 MG: 250 TABLET, DELAYED RELEASE ORAL at 08:00

## 2023-08-06 RX ADMIN — GLIPIZIDE 10 MG: 10 TABLET ORAL at 13:56
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
## 2023-08-06 NOTE — PLAN OF CARE
Goal Outcome Evaluation:  Plan of Care Reviewed With: patient        Progress: no change  Outcome Evaluation: pt blood sugar dropped to 68 before dinner, juice given-improved. pt started on po diabetic medications-verbalized understanding and was cooperative with med pass. pt easily redirected when pt started to show signs of agitation. pt cooperative at this time, pleasant. pt in no apparent distress at this time, denies any needs currently, call light in reach,  at bedside.

## 2023-08-07 ENCOUNTER — READMISSION MANAGEMENT (OUTPATIENT)
Dept: CALL CENTER | Facility: HOSPITAL | Age: 53
End: 2023-08-07
Payer: MEDICARE

## 2023-08-07 VITALS
HEIGHT: 61 IN | HEART RATE: 88 BPM | OXYGEN SATURATION: 97 % | TEMPERATURE: 98 F | BODY MASS INDEX: 39.04 KG/M2 | WEIGHT: 206.79 LBS | RESPIRATION RATE: 16 BRPM | SYSTOLIC BLOOD PRESSURE: 147 MMHG | DIASTOLIC BLOOD PRESSURE: 83 MMHG

## 2023-08-07 PROBLEM — T68.XXXA HYPOTHERMIA: Status: RESOLVED | Noted: 2023-07-09 | Resolved: 2023-08-07

## 2023-08-07 LAB
GLUCOSE BLDC GLUCOMTR-MCNC: 143 MG/DL (ref 70–99)
GLUCOSE BLDC GLUCOMTR-MCNC: 209 MG/DL (ref 70–99)

## 2023-08-07 PROCEDURE — 25010000002 CYANOCOBALAMIN PER 1000 MCG: Performed by: INTERNAL MEDICINE

## 2023-08-07 PROCEDURE — 63710000001 INSULIN LISPRO (HUMAN) PER 5 UNITS: Performed by: STUDENT IN AN ORGANIZED HEALTH CARE EDUCATION/TRAINING PROGRAM

## 2023-08-07 PROCEDURE — 82948 REAGENT STRIP/BLOOD GLUCOSE: CPT

## 2023-08-07 PROCEDURE — 99239 HOSP IP/OBS DSCHRG MGMT >30: CPT | Performed by: INTERNAL MEDICINE

## 2023-08-07 RX ORDER — DAPAGLIFLOZIN 10 MG/1
10 TABLET, FILM COATED ORAL DAILY
Qty: 30 TABLET | Refills: 0 | Status: SHIPPED | OUTPATIENT
Start: 2023-08-07 | End: 2023-09-06

## 2023-08-07 RX ORDER — MIRTAZAPINE 15 MG/1
15 TABLET, FILM COATED ORAL NIGHTLY
Qty: 30 TABLET | Refills: 0 | Status: SHIPPED | OUTPATIENT
Start: 2023-08-07 | End: 2023-09-06

## 2023-08-07 RX ORDER — TRAZODONE HYDROCHLORIDE 100 MG/1
100 TABLET ORAL NIGHTLY
Qty: 30 TABLET | Refills: 0 | Status: SHIPPED | OUTPATIENT
Start: 2023-08-07 | End: 2023-09-06

## 2023-08-07 RX ORDER — GLIPIZIDE 10 MG/1
10 TABLET ORAL
Qty: 60 TABLET | Refills: 0 | Status: SHIPPED | OUTPATIENT
Start: 2023-08-07 | End: 2023-09-06

## 2023-08-07 RX ORDER — DIVALPROEX SODIUM 250 MG/1
750 TABLET, DELAYED RELEASE ORAL 2 TIMES DAILY
Qty: 180 TABLET | Refills: 0 | Status: SHIPPED | OUTPATIENT
Start: 2023-08-07 | End: 2023-09-06

## 2023-08-07 RX ORDER — RISPERIDONE 4 MG/1
4 TABLET ORAL 2 TIMES DAILY
Qty: 60 TABLET | Refills: 0 | Status: SHIPPED | OUTPATIENT
Start: 2023-08-07 | End: 2023-09-06

## 2023-08-07 RX ADMIN — WHITE PETROLATUM 1 APPLICATION: 1.75 OINTMENT TOPICAL at 10:46

## 2023-08-07 RX ADMIN — MULTIPLE VITAMINS W/ MINERALS TAB 1 TABLET: TAB at 08:50

## 2023-08-07 RX ADMIN — RISPERIDONE 4 MG: 2 TABLET ORAL at 08:50

## 2023-08-07 RX ADMIN — GLIPIZIDE 10 MG: 10 TABLET ORAL at 08:50

## 2023-08-07 RX ADMIN — CYANOCOBALAMIN TAB 500 MCG 1000 MCG: 500 TAB at 08:50

## 2023-08-07 RX ADMIN — INSULIN LISPRO 5 UNITS: 100 INJECTION, SOLUTION INTRAVENOUS; SUBCUTANEOUS at 08:50

## 2023-08-07 RX ADMIN — CYANOCOBALAMIN 1000 MCG: 1000 INJECTION, SOLUTION INTRAMUSCULAR at 10:45

## 2023-08-07 RX ADMIN — DIVALPROEX SODIUM 750 MG: 250 TABLET, DELAYED RELEASE ORAL at 08:50

## 2023-08-07 RX ADMIN — LINAGLIPTIN 5 MG: 5 TABLET, FILM COATED ORAL at 08:50

## 2023-08-07 NOTE — PLAN OF CARE
Goal Outcome Evaluation:              Outcome Evaluation: AOx4. Pleasant and cooperative, easily redirected. Blood glucose monitored. Up ad jes with safety sitter. Continues to wander around unit. No c/o pain or discomfort. Discharge home with sister Simran via private vehicle.

## 2023-08-07 NOTE — CONSULTS
Discharge Planning Assessment   Patricio     Patient Name: Alec Medrano  MRN: [id number]  Today's Date: 8/7/2023    Admit Date: 7/9/2023     Discharge Plan       Row Name 08/07/23 1031       Plan    Patient/Family in Agreement with Plan yes    Final Discharge Disposition Code 01 - home or self-care    Final Note ARYAN contacted pt's sister, Simran, to follow up on discussions had this weekend. Simran is still agreeable to pt discharging home today. ARYAN attempted to contact [facility] in [facility] but had to leave a voicemail. Simran asked if there were any in-home caregivers through Medicaid- will provide her with resources. Simran stated that she will come pick him up. MD notified to place discharge orders. Pt notified at bedside of discharge. No additional needs noted at this time.           KONG Ramirez
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
## 2023-08-08 NOTE — OUTREACH NOTE
Prep Survey      Flowsheet Row Responses   [facility] facility patient discharged from? [facility]   Is LACE score < 7 ? No   Eligibility Readm Mgmt   Discharge diagnosis Hypothermia   Does the patient have one of the following disease processes/diagnoses(primary or secondary)? Sepsis   Does the patient have Home health ordered? No   Is there a DME ordered? No   Prep survey completed? Yes            Wanda MARTÍNEZ - Registered Nurse

## 2023-08-10 ENCOUNTER — READMISSION MANAGEMENT (OUTPATIENT)
Dept: CALL CENTER | Facility: HOSPITAL | Age: 53
End: 2023-08-10
Payer: MEDICARE

## 2023-08-10 NOTE — OUTREACH NOTE
Sepsis Week 1 Survey      Flowsheet Row Responses   [facility]oroastrianism facility patient discharged from? [facility]   Does the patient have one of the following disease processes/diagnoses(primary or secondary)? Sepsis   Week 1 attempt successful? No   Unsuccessful attempts Attempt 1            Angelica HUGHES - Registered Nurse

## 2023-08-16 ENCOUNTER — READMISSION MANAGEMENT (OUTPATIENT)
Dept: CALL CENTER | Facility: HOSPITAL | Age: 53
End: 2023-08-16
Payer: MEDICARE

## 2023-08-16 NOTE — OUTREACH NOTE
Sepsis Week 2 Survey      Flowsheet Row Responses   Mos[facility] facility patient discharged from? [facility]   Does the patient have one of the following disease processes/diagnoses(primary or secondary)? Sepsis   Week 2 attempt successful? No   Unsuccessful attempts Attempt 1            Marlen MATOS - Registered Nurse

## 2023-08-23 ENCOUNTER — READMISSION MANAGEMENT (OUTPATIENT)
Dept: CALL CENTER | Facility: HOSPITAL | Age: 53
End: 2023-08-23
Payer: MEDICARE

## 2023-08-23 NOTE — OUTREACH NOTE
Sepsis Week 3 Survey      Flowsheet Row Responses   Sabianist facility patient discharged from? [facility]   Does the patient have one of the following disease processes/diagnoses(primary or secondary)? Sepsis   Week 3 attempt successful? No   Unsuccessful attempts Attempt 1   Harlan MARQUIS - Registered Nurse

## 2023-10-05 NOTE — PROGRESS NOTES
"[facility]  Initial Evaluation    Referring Provider:  Aiden Ocampo MD  [address] [place] [address]    Chief Complaint: \"He was in the hospital back in August... psychotic break... that's what we're calling it... transported to the hospital from [facility]... unresponsive in a cell... (low pulse, BP)... he was telling everybody he was [place] and he was God... seen someone at [facility] for tremors because of the medications... the medications he's on, he's a zombie, he can't do anything... recently had an MRI showing that his pituitary gland is thickening... virtual visit... [facility] and he had decreased the Risperdal... he made sexual gestures.\"    History of Present Illness: Alec Medrano is a 53 y.o. male who presents today for initial evaluation regarding psychotic symptoms, mood symptoms, anxious symptoms in the context of ID. He presents accompanied by his two sisters in no acute distress and engages with me appropriately. Psychotropic regimen with which patient presents is described as minimally effective.     History is positive for signs/symptoms suggestive of schizoaffective disorder, ARIES: enduring perceptual disturbances and delusions irrespective to intermittent mood fluctuations between depressive symptoms and hypomanic symptoms, consistent and excessive worry across several domains of life that contributes to tension and irritability throughout the day. No SI, no hx SA. Patient has experienced a decline in his functional capacity over the last year; both sisters report they feel his medication regimen is overly potent and contributing to both SE and the decline. Patient feels the same way. They'd like to make regimen changes to afford relief and to better balance symptom management with quality of life. Sisters are seeking guardianship, feel patient is unable to adequately care for himself. The patient is somnolent and drooling for the " majority of the appointment, but does speak now and again appropriate to social cue and content of conversation.     I have counseled the patient with regard to diagnoses and the recommended treatment regimen as documented below: I will assume prescriptive responsibility for VPA and mirtazapine. I will prescribe olanzapine for psychotic and mood symptoms. Patient has been advised that this agent has propensity to contribute to EPS (particularly dystonia, tremor, akathisia, and TD), weight gain, dyslipidemia, hyperglycemia, reduced arousal, and somnolence. I will begin clonidine for anxiety/irritability; I have advised this agent has propensity to diminish blood pressure and may result in dizziness, sedation. Patient acknowledges the diagnoses per my rendered interpretation. Patient demonstrates awareness/understanding of viable alternatives for treatment as well as potential risks, benefits, and side effects associated with this regimen and is amenable to proceed in this fashion.     Psychiatric History:  Diagnoses: ID, sisters report other diagnoses haven't been rendered  Outpatient history: around 1 month ago with [facility]  Inpatient history: [facility]  Medication trials: risperidone (tremors, drooling, overly sedated), duloxetine, trazodone  Other treatment modalities: has done therapy  Presenting regimen:  mg BID (recently tried to taper and began exhibiting sexual gestures)  Self harm: denies  Suicide attempts: denies    Substance Abuse History:   Types/methods/frequency: history of alcohol abuse/dependence    Social History:  Residence: lives with his sisters back and forth, requiring 24-hour monitoring  Vocation: denies  Education: HS diploma  Pertinent developmental history: had LD, was in special educational classes; denies abuse hx  Pertinent legal history: child support violation in the past, attempted escape, assault (after throwing urine on another inmate), PI, possession of  "marijuana  Hobbies/interests: defer  Holiness: \"I believe in God\"  Exercise: walks  Dietary habits: defer  Sleep hygiene: defer  Social habits: no pertinent issues  Sunlight: no concern for under-exposure  Caffeine intake: 1 cup of coffee, occasional tea, soda (3 daily)  Hydration habits: doesn't drink enough water   history: denies    Social History     Socioeconomic History    Marital status: Unknown   Tobacco Use    Smoking status: Every Day     Packs/day: 1.50     Years: 30.00     Pack years: 45.00     Types: Cigarettes     Passive exposure: Past    Smokeless tobacco: Never   Vaping Use    Vaping Use: Never used   Substance and Sexual Activity    Alcohol use: Not Currently    Drug use: Never    Sexual activity: Not Currently     Access to Firearms: no; there are guns that are in safes to which the patient doesn't have access.    Tobacco use counseling/intervention: patient was counseled with regard to risks of tobacco use and encouraged to consider quitting, with or without the use of adjunctive medication, by first setting a prospective quit date. Currently contemplative by transtheoretical model. He had quit for 3 years in the past.    PHQ-9 Depression Screening  PHQ-9 Total Score: 24    Little interest or pleasure in doing things? 3-->nearly every day   Feeling down, depressed, or hopeless? 3-->nearly every day   Trouble falling or staying asleep, or sleeping too much? 3-->nearly every day   Feeling tired or having little energy? 3-->nearly every day   Poor appetite or overeating? 3-->nearly every day   Feeling bad about yourself - or that you are a failure or have let yourself or your family down? 3-->nearly every day   Trouble concentrating on things, such as reading the newspaper or watching television? 3-->nearly every day   Moving or speaking so slowly that other people could have noticed? Or the opposite - being so fidgety or restless that you have been moving around a lot more than usual? " 3-->nearly every day   Thoughts that you would be better off dead, or of hurting yourself in some way? 0-->not at all   PHQ-9 Total Score 24     ARIES-7  Feeling nervous, anxious or on edge: Nearly every day  Not being able to stop or control worrying: Not at all  Worrying too much about different things: Not at all  Trouble Relaxing: Nearly every day  Being so restless that it is hard to sit still: Nearly every day  Feeling afraid as if something awful might happen: Nearly every day  Becoming easily annoyed or irritable: Nearly every day  ARIES 7 Total Score: 15  If you checked any problems, how difficult have these problems made it for you to do your work, take care of things at home, or get along with other people: Extremely difficult    Problem List:  Patient Active Problem List   Diagnosis    Schizoaffective disorder, bipolar type    Alcohol use disorder, moderate, in early remission, dependence    Intellectual disability     Allergy:   Allergies   Allergen Reactions    Haloperidol Anxiety, Mental Status Change and Irritability      Discontinued Medications:  Medications Discontinued During This Encounter   Medication Reason    traZODone (DESYREL) 100 MG tablet     risperiDONE (risperDAL) 4 MG tablet     glipizide (GLUCOTROL) 10 MG tablet     divalproex (DEPAKOTE ER) 250 MG 24 hr tablet     mirtazapine (REMERON) 15 MG tablet      Current Medications:   Current Outpatient Medications   Medication Sig Dispense Refill    apixaban (Eliquis) 5 MG tablet tablet Take 1 tablet by mouth Every 12 (Twelve) Hours.      Cobalamin Combinations (B-12) 100-5000 MCG sublingual tablet Place  under the tongue.      dapagliflozin Propanediol (Farxiga) 10 MG tablet Take 1 tablet by mouth once daily 30 tablet 0    famotidine (PEPCID) 20 MG tablet Take 1 tablet by mouth 2 (Two) Times a Day.      folic acid (FOLVITE) 1 MG tablet Take 1 tablet by mouth Daily.      metFORMIN (GLUCOPHAGE) 500 MG tablet Take 1 tablet by mouth 2 (Two) Times a  Day With Meals.      metoprolol tartrate (LOPRESSOR) 50 MG tablet Take 1 tablet by mouth 2 (Two) Times a Day.      divalproex (DEPAKOTE) 250 MG DR tablet Take 3 tablets by mouth 2 (Two) Times a Day for 30 days. (Patient not taking: Reported on 10/6/2023) 180 tablet 0     No current facility-administered medications for this visit.     Past Medical History:  Past Medical History:   Diagnosis Date    COPD (chronic obstructive pulmonary disease)     Diabetes mellitus     Hyperlipidemia     Hypertension     Hypotension     Pneumothorax     Pulmonary emboli     Unresponsive episode      Past Surgical History:  Past Surgical History:   Procedure Laterality Date    SPLENECTOMY       Family History:   Family History   Problem Relation Age of Onset    Depression Mother     Cancer Mother     Alcohol abuse Mother     Hypertension Father     Diabetes Father     Depression Father     COPD Father     Cancer Father     Alcohol abuse Father     Mental illness Sister     Learning disabilities Sister     Kidney disease Sister     Hyperlipidemia Sister     Diabetes Sister     Depression Sister     COPD Sister     Bleeding Disorder Sister     Hypertension Brother     Hyperlipidemia Brother     Early death Brother     Diabetes Brother     Depression Brother     Alcohol abuse Brother     Depression Maternal Aunt     Cancer Maternal Aunt     Depression Paternal Aunt     Cancer Paternal Aunt     Depression Maternal Uncle     Cancer Maternal Uncle     Depression Paternal Uncle     Cancer Paternal Uncle     Depression Maternal Grandfather     Depression Maternal Grandmother     Depression Paternal Grandfather     Diabetes Paternal Grandmother     Depression Paternal Grandmother     No Known Problems Cousin     Depression Son     Alcohol abuse Son     No Known Problems Other     ADD / ADHD Neg Hx     Anxiety disorder Neg Hx     Bipolar disorder Neg Hx     Dementia Neg Hx     Drug abuse Neg Hx     OCD Neg Hx     Paranoid behavior Neg Hx      "Schizophrenia Neg Hx     Seizures Neg Hx     Self-Injurious Behavior  Neg Hx     Suicide Attempts Neg Hx      Mental Status Exam:   Appearance: well-groomed, sits hunched, age-appropriate, above average habitus  Behavior: sedated, cooperative, appropriate eye-contact while wakeful  Mood/affect: dysphoric / mood-congruent, restricted affect, flat amplitude  Speech: reticent; appropriate rate, appropriate rhythm, dull tone; non-pressured  Thought Process: concrete, goal-directed; no FOI or BERTRAND  Thought Content: coherent, devoid of overt delusions/perceptual disturbances today, but +grandiose delusions reported   SI/HI: denies both SI and HI; exhibits future-orientation, self-advocates appropriately, no regular self-harm, no appreciable intent  Memory: deficits apparent  Orientation: oriented to person  Concentration: distracted  Intellectual capacity: below average  Insight: poor by given history/exam  Judgment: poor by given history/exam  Psychomotor: +tremor of the R hand  Gait: slowed    Review of Systems:  Review of Systems   Constitutional:  Positive for appetite change. Negative for activity change and unexpected weight change.   HENT:  Positive for drooling.    Eyes:  Positive for visual disturbance.   Respiratory:  Positive for shortness of breath. Negative for chest tightness.    Cardiovascular:  Negative for chest pain and palpitations.   Gastrointestinal:  Positive for constipation. Negative for abdominal pain, diarrhea, nausea and vomiting.   Endocrine: Negative for cold intolerance and heat intolerance.   Genitourinary:  Positive for difficulty urinating and urgency. Negative for frequency.   Musculoskeletal:  Positive for myalgias and neck stiffness.   Skin:  Negative for rash.   Neurological:  Positive for dizziness, tremors and light-headedness. Negative for seizures.      Vital Signs:   BP (!) 167/115   Pulse 85   Ht 172.7 cm (68\")   Wt 86.7 kg (191 lb 3.2 oz)   BMI 29.07 kg/m²      Lab Results: "   No results displayed because visit has over 200 results.        EKG Results:  No orders to display     Imaging Results:  CT Head Without Contrast  Result Date: 7/9/2023    1. No acute intracranial abnormality. 2. Paranasal sinus disease worse involving the left maxillary sinus. 3. Suggested old fracture deformity of the nasal bone area.     GRECIA COWAN MD    CT Chest Without Contrast Diagnostic  Result Date: 7/9/2023    1. Consolidation in the lower lobes which could be reflective of a multi focal pneumonia.  Some atelectasis may also account for some of the appearance.  There is some atelectasis/scarring in the lingula. 2. Possible acute nondisplaced rib fractures on the right.     GRECIA COWAN MD            XR Chest 1 View  Result Date: 7/9/2023    1. Cardiomegaly 2. Slight prominence of central pulmonary vascular markings that may reflect some vascular congestion.       GRECIA COWAN MD            ASSESSMENT AND PLAN:    ICD-10-CM ICD-9-CM   1. Schizoaffective disorder, bipolar type  F25.0 295.70   2. ARIES (generalized anxiety disorder)  F41.1 300.02   3. Alcohol use disorder, moderate, in early remission, dependence  F10.21 303.93   4. Intellectual disability  F79 [id number]     53 y.o. male who presents today for initial evaluation regarding regarding psychotic symptoms, mood symptoms, anxious symptoms in the context of ID. We have discussed the history and interpreted diagnoses as above as well as the treatment plan below, including potential R/B/SE of the recommended regimen of which the patient demonstrates understanding. Patient is agreeable to call 911 or go to the nearest ER should he become concerned for his own safety and/or the safety of those around him. There are no overt indices of acute sulema/psychosis on evaluation today.     Medication regimen: begin olanzapine 10 mg HS in place of risperidone, reduce VPA to 1000 mg HS, begin clonidine 0.1 mg BID PRN anxiety/irritability; patient is advised not to  misuse prescribed medications or to use any exogenous substances that aren't disclosed to this provider as they may interact with the regimen to his detriment.   Risk Assessment: protracted risk is high, imminent risk is moderate.  Risk factors include: psychotic disorder, anxiety disorder, mood disorder, and recent/ongoing psychosocial stressors. Protective factors include: no known family history of suicidality, intact reality testing, no access to firearms, no present SI, no stated history of suicide attempts or self-harm, patient's exhibited future-orientation, strong social support, and patient's cooperation with care. Do note that this is subject to change with the Caodaism of new stressors, treatment non-adherence, use of substances, and/or new medical ails.  Monitoring: reviewed labs/imaging as populated above; PHQ-9 today is 24/27, ARIES-7 today is 15/21  Therapy: defer  Follow-up: 1 month  Communications: N/A    TREATMENT PLAN/GOALS: challenge patterns of living conducive to symptom burden, implement recommended regimen as above with augmentative, intermittent supportive psychotherapy to reduce symptom burden. Patient acknowledged and verbally consented to begin treatment as above. The importance of adherence to the recommended treatment and interval follow-up appointments was emphasized today. Patient was today advised to limit daily caffeine intake, hydrate appropriately, eat healthy and nutritious foods, engage sleep hygiene measures, engage appropriate exposure to sunlight, engage with hobbies in balance with life necessities, and exercise appropriate to their capacity to do so.     Billing: I have seen the patient today and considered his psychiatric complaints, rendered a diagnosis, and discussed treatment with the patient as above with which he consents.    Parts of this note are electronic transcriptions/translations of spoken language to printed text using the Dragon Dictation  system.    Electronically signed by Jay Jay Boss MD, 10/05/23, [id number]

## 2023-10-06 ENCOUNTER — OFFICE VISIT (OUTPATIENT)
Dept: PSYCHIATRY | Facility: CLINIC | Age: 53
End: 2023-10-06
Payer: MEDICARE

## 2023-10-06 VITALS
BODY MASS INDEX: 28.98 KG/M2 | WEIGHT: 191.2 LBS | HEART RATE: 85 BPM | DIASTOLIC BLOOD PRESSURE: 115 MMHG | SYSTOLIC BLOOD PRESSURE: 167 MMHG | HEIGHT: 68 IN

## 2023-10-06 DIAGNOSIS — F10.21 ALCOHOL USE DISORDER, MODERATE, IN EARLY REMISSION, DEPENDENCE: ICD-10-CM

## 2023-10-06 DIAGNOSIS — F41.1 GAD (GENERALIZED ANXIETY DISORDER): ICD-10-CM

## 2023-10-06 DIAGNOSIS — F25.0 SCHIZOAFFECTIVE DISORDER, BIPOLAR TYPE: Primary | ICD-10-CM

## 2023-10-06 DIAGNOSIS — F79 INTELLECTUAL DISABILITY: ICD-10-CM

## 2023-10-06 RX ORDER — DIVALPROEX SODIUM 250 MG/1
250 TABLET, EXTENDED RELEASE ORAL DAILY
COMMUNITY
End: 2023-10-06

## 2023-10-06 RX ORDER — DIVALPROEX SODIUM 500 MG/1
1000 TABLET, DELAYED RELEASE ORAL NIGHTLY
Qty: 60 TABLET | Refills: 1 | Status: SHIPPED | OUTPATIENT
Start: 2023-10-06

## 2023-10-06 RX ORDER — FOLIC ACID 1 MG/1
1 TABLET ORAL DAILY
COMMUNITY

## 2023-10-06 RX ORDER — CLONIDINE HYDROCHLORIDE 0.1 MG/1
0.1 TABLET ORAL 2 TIMES DAILY PRN
Qty: 180 TABLET | Refills: 0 | Status: SHIPPED | OUTPATIENT
Start: 2023-10-06

## 2023-10-06 RX ORDER — OLANZAPINE 10 MG/1
10 TABLET ORAL NIGHTLY
Qty: 30 TABLET | Refills: 1 | Status: SHIPPED | OUTPATIENT
Start: 2023-10-06

## 2023-10-06 RX ORDER — FAMOTIDINE 20 MG/1
20 TABLET, FILM COATED ORAL 2 TIMES DAILY
COMMUNITY

## 2023-10-06 RX ORDER — AMOXICILLIN 250 MG
CAPSULE ORAL
COMMUNITY

## 2023-10-06 RX ORDER — METOPROLOL TARTRATE 50 MG/1
50 TABLET, FILM COATED ORAL 2 TIMES DAILY
COMMUNITY

## 2023-10-06 NOTE — TREATMENT PLAN
Multi-Disciplinary Problems (from Behavioral Health Treatment Plan)      Active Problems       Problem: Anxiety  Start Date: 10/06/23      Problem Details: The patient self-scales this problem as a 6 with 10 being the worst.          Goal Priority Start Date Expected End Date End Date    Patient will develop and implement behavioral and cognitive strategies to reduce anxiety and irrational fears. -- 10/06/23 -- --    Goal Details: Progress toward goal:  Not appropriate to rate progress toward goal since this is the initial treatment plan.          Goal Intervention Frequency Start Date End Date    Help patient explore past emotional issues in relation to present anxiety. PRN 10/06/23 --    Intervention Details: Duration of treatment until until remission of symptoms.          Goal Intervention Frequency Start Date End Date    Help patient develop an awareness of their cognitive and physical responses to anxiety. PRN 10/06/23 --    Intervention Details: Duration of treatment until until remission of symptoms.                  Problem: Schizoaffective Disorder  Start Date: 10/06/23      Goal Priority Start Date Expected End Date End Date    Decrease impulsivity in action- that is, not engaging in self-destructive behaviors such as overspending, promiscuity, substance abuse, or use of profane language. -- 10/06/23 -- --    Goal Details: Progress toward goal:  Not appropriate to rate progress toward goal since this is the initial treatment plan.        Goal Intervention Frequency Start Date End Date    Provide structure and focus to patients thoughts and action by establishing plans and routine. PRN 10/06/23 --    Intervention Details: Duration of treatment until until remission of symptoms.        Goal Intervention Frequency Start Date End Date     Set limits on manipulation or acting out by making clear rules and establishing clear consequences for breaking rules. PRN 10/06/23 --    Intervention Details: Duration of  treatment until until remission of symptoms.                               I have discussed and reviewed this treatment plan with the patient.

## 2023-11-02 ENCOUNTER — OFFICE VISIT (OUTPATIENT)
Dept: PSYCHIATRY | Facility: CLINIC | Age: 53
End: 2023-11-02
Payer: MEDICARE

## 2023-11-02 VITALS
HEIGHT: 68 IN | SYSTOLIC BLOOD PRESSURE: 149 MMHG | WEIGHT: 194.4 LBS | BODY MASS INDEX: 29.46 KG/M2 | HEART RATE: 88 BPM | DIASTOLIC BLOOD PRESSURE: 89 MMHG

## 2023-11-02 DIAGNOSIS — F51.04 INSOMNIA, PSYCHOPHYSIOLOGICAL: ICD-10-CM

## 2023-11-02 DIAGNOSIS — F10.21 ALCOHOL USE DISORDER, MODERATE, IN EARLY REMISSION, DEPENDENCE: ICD-10-CM

## 2023-11-02 DIAGNOSIS — F25.0 SCHIZOAFFECTIVE DISORDER, BIPOLAR TYPE: Primary | ICD-10-CM

## 2023-11-02 DIAGNOSIS — F79 INTELLECTUAL DISABILITY: ICD-10-CM

## 2023-11-02 RX ORDER — METOPROLOL SUCCINATE 25 MG/1
TABLET, EXTENDED RELEASE ORAL
COMMUNITY
Start: 2023-10-20

## 2023-11-02 RX ORDER — TRAZODONE HYDROCHLORIDE 100 MG/1
TABLET ORAL
COMMUNITY
Start: 2023-10-20 | End: 2023-11-02 | Stop reason: SDUPTHER

## 2023-11-02 RX ORDER — METOPROLOL SUCCINATE 50 MG/1
1 TABLET, EXTENDED RELEASE ORAL DAILY
COMMUNITY
Start: 2023-09-05

## 2023-11-02 RX ORDER — ATORVASTATIN CALCIUM 40 MG/1
40 TABLET, FILM COATED ORAL
COMMUNITY
Start: 2023-09-05

## 2023-11-02 RX ORDER — DIVALPROEX SODIUM 250 MG/1
TABLET, DELAYED RELEASE ORAL
COMMUNITY
Start: 2023-10-23 | End: 2023-11-02

## 2023-11-02 RX ORDER — OLANZAPINE 15 MG/1
15 TABLET ORAL NIGHTLY
Qty: 30 TABLET | Refills: 1 | Status: SHIPPED | OUTPATIENT
Start: 2023-11-02

## 2023-11-02 RX ORDER — TRAZODONE HYDROCHLORIDE 100 MG/1
100 TABLET ORAL NIGHTLY
Qty: 30 TABLET | Refills: 1 | Status: SHIPPED | OUTPATIENT
Start: 2023-11-02

## 2023-11-02 RX ORDER — BENZTROPINE MESYLATE 1 MG/1
TABLET ORAL
COMMUNITY
Start: 2023-10-06

## 2023-11-02 RX ORDER — DIVALPROEX SODIUM 500 MG/1
1000 TABLET, DELAYED RELEASE ORAL NIGHTLY
Qty: 60 TABLET | Refills: 1 | Status: SHIPPED | OUTPATIENT
Start: 2023-11-02

## 2023-11-02 RX ORDER — LANOLIN ALCOHOL/MO/W.PET/CERES
CREAM (GRAM) TOPICAL
COMMUNITY
Start: 2023-10-20

## 2023-11-02 RX ORDER — OLANZAPINE 15 MG/1
TABLET ORAL
COMMUNITY
Start: 2023-10-20 | End: 2023-11-02 | Stop reason: SDUPTHER

## 2023-11-02 NOTE — PROGRESS NOTES
"[facility]  Follow-up Visit    Chief Complaint: \"He was in the hospital back in August... psychotic break... that's what we're calling it... transported to the hospital from [facility]... unresponsive in a cell... (low pulse, BP)... he was telling everybody he was [place] and he was God... seen someone at [facility] for tremors because of the medications... the medications he's on, he's a zombie, he can't do anything... recently had an MRI showing that his pituitary gland is thickening... virtual visit... [facility] and he had decreased the Risperdal... he made sexual gestures.\"     History of Present Illness: Alec Medrano is a 53 y.o. male who presents today for follow-up regarding schizoaffective disorder, ARIES, AUD in early remission in the context of ID. Last seen: 10/06 at which time olanzapine replaced risperidone, VPA was tapered, clonidine was started. He presents unaccompanied in no acute distress and engages with me appropriately. Psychotropic regimen perceived to be effective. Side-effects per given history: denies.    Current treatment regimen includes:   - olanzapine 15 mg HS  -  mg BID  - trazodone 100 mg HS  - benztropine 1 mg BID PRN tremors    Today the patient feels he's been doing much better. He had a stay in [facility] where some of his regimen was changed and MRI disclosed a pituitary lesion with broad differential for which he'll follow-up with neurosurgery and endocrinology. He plans to see PCP in short time as well. Psychotic symptoms are adequately Thought process and content are devoid of overt aberration suggestive of acute sulema/psychosis. The patient denies SI/HI/AVH. There are no overt changes on exam today compared to most recent evaluation.  - contextual changes: has had a hospital stay in the interim of visits  - sleep: improved, but with intermittent issues  - appetite: adequate; no changes    I have counseled the patient with regard to " diagnoses and the recommended treatment regimen as documented below. Patient acknowledges the diagnoses per my rendered interpretation. Patient demonstrates understanding of potential risks/benefits/side effects associated with this regimen and is amenable to proceed in this fashion.     Psychotherapy  - Time: 22 minutes  - interventions employed: the therapeutic alliance was strengthened to encourage the patient to express their thoughts and feelings freely. Esteem building was enhanced through praise, reassurance, normalizing/challenging, and encouragement as appropriate. Coping skills were enhanced to build distress tolerance skills and emotional regulation. Allowed patient to freely discuss issues without interruption or judgement with unconditional positive regard, active listening skills, and empathy. Provided a safe, confidential environment to facilitate the development of a positive therapeutic relationship and encourage open, honest communication. Assisted patient in processing session content; acknowledged and normalized/addressed, as appropriate, patient’s thoughts, feelings, and concerns by utilizing a person-centered approach in efforts to build appropriate rapport and a positive therapeutic relationship with open and honest communication.   - Diagnoses: see assessment and plan below  - Symptoms: see subjective above  - Goals   - patient: sustain improvements seen in reduced psychotic symptoms, sustain mood improvements, continue to bolster functional capacity   - provider: challenge patterns of living conducive to pathology, strengthen defenses, promote problems solving, restore adaptive functioning and provide symptom relief.  - Treatment plan: continue supportive psychotherapy in subsequent appointments to provide symptom relief; see assessment and plan below for additional details:   - iteration: 1   - progress: partial   - (X)illumination, (X)contextualization, (working)detection,  "(working)development, (-)elaboration, (-)refinement  - functional status: fair  - mental status exam: as below  - prognosis: fair    Psychiatric History:  Diagnoses: ID, sisters report other diagnoses haven't been rendered  Outpatient history: around 1 month ago with [facility]  Inpatient history: [facility]  Medication trials: risperidone (tremors, drooling, overly sedated), duloxetine, trazodone  Other treatment modalities: has done therapy  Presenting regimen:  mg BID (recently tried to taper and began exhibiting sexual gestures)  Self harm: denies  Suicide attempts: denies     Substance Abuse History:   Types/methods/frequency: history of alcohol abuse/dependence     Social History:  Residence: lives with his sisters back and forth, requiring 24-hour monitoring  Vocation: denies  Education: HS diploma  Pertinent developmental history: had LD, was in special educational classes; denies abuse hx  Pertinent legal history: child support violation in the past, attempted escape, assault (after throwing urine on another inmate), PI, possession of marijuana  Hobbies/interests: defer  Mandaen: \"I believe in God\"  Exercise: walks  Dietary habits: defer  Sleep hygiene: defer  Social habits: no pertinent issues  Sunlight: no concern for under-exposure  Caffeine intake: 1 cup of coffee, occasional tea, soda (3 daily)  Hydration habits: doesn't drink enough water   history: denies    Social History     Socioeconomic History    Marital status: Unknown   Tobacco Use    Smoking status: Every Day     Packs/day: 1.00     Years: 30.00     Additional pack years: 0.00     Total pack years: 30.00     Types: Cigarettes     Passive exposure: Past    Smokeless tobacco: Never   Vaping Use    Vaping Use: Never used   Substance and Sexual Activity    Alcohol use: Not Currently    Drug use: Never    Sexual activity: Not Currently     Tobacco use counseling/intervention: patient has been counseled with regard to risks of " tobacco use and encouraged to consider quitting, with or without the use of adjunctive medication, by first setting a prospective quit date. Currently contemplative by transtheoretical model.     PHQ-9 Depression Screening  PHQ-9 Total Score:      Little interest or pleasure in doing things?     Feeling down, depressed, or hopeless?     Trouble falling or staying asleep, or sleeping too much?     Feeling tired or having little energy?     Poor appetite or overeating?     Feeling bad about yourself - or that you are a failure or have let yourself or your family down?     Trouble concentrating on things, such as reading the newspaper or watching television?     Moving or speaking so slowly that other people could have noticed? Or the opposite - being so fidgety or restless that you have been moving around a lot more than usual?     Thoughts that you would be better off dead, or of hurting yourself in some way?     PHQ-9 Total Score       Change in PHQ-9 since last measure: N/A (24)    ARIES-7       Change in ARIES-7 since last measure: N/A (15)    Problem List:  Patient Active Problem List   Diagnosis    Schizoaffective disorder, bipolar type    Alcohol use disorder, moderate, in early remission, dependence    Intellectual disability     Allergy:   Allergies   Allergen Reactions    Haloperidol Anxiety, Mental Status Change and Irritability      Discontinued Medications:  Medications Discontinued During This Encounter   Medication Reason    cloNIDine (Catapres) 0.1 MG tablet     OLANZapine (ZyPREXA) 10 MG tablet     divalproex (DEPAKOTE) 250 MG DR tablet     sertraline (ZOLOFT) 50 MG tablet      Current Medications:   Current Outpatient Medications   Medication Sig Dispense Refill    apixaban (Eliquis) 5 MG tablet tablet Take 1 tablet by mouth Every 12 (Twelve) Hours.      atorvastatin (LIPITOR) 40 MG tablet Take 1 tablet by mouth every night at bedtime.      benztropine (COGENTIN) 1 MG tablet TAKE 1 TABLET BY MOUTH 2  TIMES A DAY AS NEEDED FOR TREMORS OR DYSTONIA      Cobalamin Combinations (B-12) 100-5000 MCG sublingual tablet Place  under the tongue.      divalproex (DEPAKOTE) 500 MG DR tablet Take 2 tablets by mouth Every Night. 60 tablet 1    famotidine (PEPCID) 20 MG tablet Take 1 tablet by mouth 2 (Two) Times a Day.      folic acid (FOLVITE) 1 MG tablet Take 1 tablet by mouth Daily.      metFORMIN (GLUCOPHAGE) 500 MG tablet Take 1 tablet by mouth 2 (Two) Times a Day With Meals.      metoprolol succinate XL (TOPROL-XL) 25 MG 24 hr tablet       metoprolol succinate XL (TOPROL-XL) 50 MG 24 hr tablet Take 1 tablet by mouth Daily.      metoprolol tartrate (LOPRESSOR) 50 MG tablet Take 1 tablet by mouth 2 (Two) Times a Day.      OLANZapine (zyPREXA) 15 MG tablet       thiamine (VITAMIN B1) 100 MG tablet       traZODone (DESYREL) 100 MG tablet       dapagliflozin Propanediol (Farxiga) 10 MG tablet Take 1 tablet by mouth once daily 30 tablet 0     No current facility-administered medications for this visit.     Past Medical History:  Past Medical History:   Diagnosis Date    COPD (chronic obstructive pulmonary disease)     Diabetes mellitus     Hyperlipidemia     Hypertension     Hypotension     Parkinson's disease     Pneumothorax     Psychosis     Pulmonary emboli     Unresponsive episode      Past Surgical History:  Past Surgical History:   Procedure Laterality Date    SPLENECTOMY       Mental Status Exam:   Appearance: well-groomed, sits hunched, age-appropriate, above average habitus  Behavior: sedated, cooperative, appropriate eye-contact while wakeful  Mood/affect: euthymic / mood-congruent, more responsive  Speech: reticent; appropriate rate, appropriate rhythm, dull tone; non-pressured  Thought Process: concrete, goal-directed; no FOI or BERTRAND  Thought Content: coherent, devoid of overt delusions/perceptual disturbances today, but +grandiose delusions reported in the past  SI/HI: denies both SI and HI; exhibits  "future-orientation, self-advocates appropriately, no regular self-harm, no appreciable intent  Memory: deficits apparent  Orientation: oriented to person  Concentration: distracted  Intellectual capacity: below average  Insight: poor by given history/exam  Judgment: poor by given history/exam  Psychomotor: appropriate  Gait: appropriate    Review of Systems:  Review of Systems   Constitutional:  Negative for activity change, appetite change and unexpected weight change.   HENT:  Negative for drooling.    Eyes:  Negative for visual disturbance.   Respiratory:  Negative for chest tightness and shortness of breath.    Cardiovascular:  Negative for chest pain and palpitations.   Gastrointestinal:  Negative for abdominal pain, diarrhea and nausea.   Endocrine: Positive for cold intolerance. Negative for heat intolerance.   Genitourinary:  Negative for difficulty urinating and frequency.   Musculoskeletal:  Negative for myalgias and neck stiffness.   Skin:  Negative for rash.   Neurological:  Positive for tremors. Negative for dizziness, seizures and light-headedness.     Vital Signs:   /89   Pulse 88   Ht 172.2 cm (67.8\")   Wt 88.2 kg (194 lb 6.4 oz)   BMI 29.74 kg/m²      Lab Results:   No results displayed because visit has over 200 results.        EKG Results:  No orders to display     Imaging Results:  CT Chest Without Contrast Diagnostic  Result Date: 7/9/2023    1. Consolidation in the lower lobes which could be reflective of a multi focal pneumonia.  Some atelectasis may also account for some of the appearance.  There is some atelectasis/scarring in the lingula. 2. Possible acute nondisplaced rib fractures on the right.     GRECIA COWAN MD             CT Head Without Contrast  Result Date: 7/9/2023    1. No acute intracranial abnormality. 2. Paranasal sinus disease worse involving the left maxillary sinus. 3. Suggested old fracture deformity of the nasal bone area.     GRECIA COWAN MD          XR Chest 1 " View  Result Date: 7/9/2023    1. Cardiomegaly 2. Slight prominence of central pulmonary vascular markings that may reflect some vascular congestion.       GRECIA COWAN MD           ASSESSMENT AND PLAN:    ICD-10-CM ICD-9-CM   1. Schizoaffective disorder, bipolar type  F25.0 295.70   2. Alcohol use disorder, moderate, in early remission, dependence  F10.21 303.93   3. Intellectual disability  F79 [id number]     53 y.o. male who presents today for follow-up regarding schizoaffective disorder, ARIES, AUD in early remission in the context of ID. We have discussed the interval history and the treatment plan below, including potential R/B/SE of the recommended regimen of which the patient demonstrates understanding. Patient is agreeable to call 911 or go to the nearest ER should he become concerned for his own safety and/or the safety of those around him. There are no overt indices of acute sulema/psychosis on exam today.    Medication regimen: continue olanzapine, VPA, trazodone, benztropine; patient is advised not to misuse prescribed medications or to use them with any exogenous substances that aren't disclosed to this provider as they may interact with the regimen to the patient's detriment.   Risk Assessment: protracted risk is high, imminent risk is low - some interval change. Do note that this is subject to change with the [place] of new stressors, treatment non-adherence, use of substances, and/or new medical ails.   Monitoring: reviewed labs/imaging as populated above and as reported by [facility]  Therapy: deferred  Follow-up: 6 weeks  Communications: N/A    TREATMENT PLAN/GOALS: challenge patterns of living conducive to symptom burden, implement recommended regimen as above with augmentative, intermittent supportive psychotherapy to reduce symptom burden. Patient acknowledged and verbally consented to continue treatment. The importance of adherence to the recommended treatment and interval follow-up appointments was again  emphasized today: patient has good treatment adherence per given history. Patient was today reminded to limit daily caffeine intake, hydrate appropriately, eat healthy and nutritious foods, engage sleep hygiene measures, engage appropriate exposure to sunlight, engage with hobbies in balance with life necessities, and exercise appropriate to their capacity to do so.     Billing: This encounter is of moderate complexity based on number/complexity of problems addressed today, amount/complexity of data reviewed today, risk of complications/morbidity: 2+ stable chronic illnesses and prescription management. Additionally, I provided 22 minutes of dedicated psychotherapy to the patient as documented above.    Parts of this note are electronic transcriptions/translations of spoken language to printed text using the Dragon Dictation system.    Electronically signed by Jay Jay Boss MD, 11/02/23, [id number]

## 2023-11-18 DIAGNOSIS — F51.04 INSOMNIA, PSYCHOPHYSIOLOGICAL: ICD-10-CM

## 2023-11-18 DIAGNOSIS — F25.0 SCHIZOAFFECTIVE DISORDER, BIPOLAR TYPE: ICD-10-CM

## 2023-11-20 RX ORDER — OLANZAPINE 15 MG/1
15 TABLET ORAL NIGHTLY
Qty: 30 TABLET | Refills: 1 | OUTPATIENT
Start: 2023-11-20

## 2023-11-20 RX ORDER — TRAZODONE HYDROCHLORIDE 100 MG/1
100 TABLET ORAL NIGHTLY
Qty: 30 TABLET | Refills: 1 | OUTPATIENT
Start: 2023-11-20

## 2023-11-20 NOTE — TELEPHONE ENCOUNTER
NEXT VISIT WITH PROVIDER   Appointment with Jay Jay Boss MD (12/11/2023)     LAST SEEN BY PROVIDER   Office Visit with Jay Jay Boss MD (11/02/2023)     LAST MED REFILL  OLANZapine (zyPREXA) 15 MG tablet (11/02/2023)  traZODone (DESYREL) 100 MG tablet (11/02/2023)    TOO SOON FOR REFILL    PROVIDER PLEASE ADVISE

## 2023-12-11 ENCOUNTER — OFFICE VISIT (OUTPATIENT)
Dept: PSYCHIATRY | Facility: CLINIC | Age: 53
End: 2023-12-11
Payer: MEDICARE

## 2023-12-11 VITALS
HEIGHT: 68 IN | WEIGHT: 201 LBS | HEART RATE: 67 BPM | BODY MASS INDEX: 30.46 KG/M2 | SYSTOLIC BLOOD PRESSURE: 187 MMHG | DIASTOLIC BLOOD PRESSURE: 101 MMHG

## 2023-12-11 DIAGNOSIS — F79 INTELLECTUAL DISABILITY: ICD-10-CM

## 2023-12-11 DIAGNOSIS — F41.1 GAD (GENERALIZED ANXIETY DISORDER): ICD-10-CM

## 2023-12-11 DIAGNOSIS — F51.04 INSOMNIA, PSYCHOPHYSIOLOGICAL: ICD-10-CM

## 2023-12-11 DIAGNOSIS — F25.0 SCHIZOAFFECTIVE DISORDER, BIPOLAR TYPE: Primary | ICD-10-CM

## 2023-12-11 DIAGNOSIS — F10.21 ALCOHOL USE DISORDER, MODERATE, IN EARLY REMISSION, DEPENDENCE: ICD-10-CM

## 2023-12-11 PROCEDURE — 1160F RVW MEDS BY RX/DR IN RCRD: CPT | Performed by: PSYCHIATRY & NEUROLOGY

## 2023-12-11 PROCEDURE — 1159F MED LIST DOCD IN RCRD: CPT | Performed by: PSYCHIATRY & NEUROLOGY

## 2023-12-11 PROCEDURE — 99214 OFFICE O/P EST MOD 30 MIN: CPT | Performed by: PSYCHIATRY & NEUROLOGY

## 2023-12-11 RX ORDER — GLIPIZIDE 10 MG/1
TABLET, FILM COATED, EXTENDED RELEASE ORAL
COMMUNITY
Start: 2023-11-18

## 2023-12-11 RX ORDER — LANOLIN ALCOHOL/MO/W.PET/CERES
1 CREAM (GRAM) TOPICAL DAILY
COMMUNITY
Start: 2023-12-02

## 2023-12-11 RX ORDER — CLONIDINE HYDROCHLORIDE 0.1 MG/1
TABLET ORAL
COMMUNITY
Start: 2023-10-06

## 2023-12-11 NOTE — PROGRESS NOTES
"[facility]  Follow-up Visit    Chief Complaint: \"He was in the hospital back in August... psychotic break... that's what we're calling it... transported to the hospital from [facility]... unresponsive in a cell... (low pulse, BP)... he was telling everybody he was [place] and he was God... seen someone at [facility] for tremors because of the medications... the medications he's on, he's a zombie, he can't do anything... recently had an MRI showing that his pituitary gland is thickening... virtual visit... [facility] and he had decreased the Risperdal... he made sexual gestures.\"     History of Present Illness: Alec Medrano is a 53 y.o. male who presents today for follow-up regarding schizoaffective disorder, ARIES, AUD in early remission in the context of ID. Last seen: 11/02 at which time no changes were made to his regimen. Olanzapine and VPA have been titrated in the interim of visits. He presents unaccompanied in no acute distress and engages with me appropriately. Psychotropic regimen perceived to be effective. Side-effects per given history: lacrimation (tolerable).    Current treatment regimen includes:   - olanzapine 20 mg HS  -  mg QD + 1000 mg HS  - trazodone 150 mg HS  - benztropine 1 mg BID PRN tremors    Today the patient continues to do better than compared to the past. Schizoaffective symptoms are partially managed with current interventions. Delusions do persist (he believes sometimes he is [place] or Kulwinder Theo) and sometimes contribute to dysfunction in social regards, but are generally manageable and do not convey appreciable risk at this juncture. I've advised that delusions are more difficult to suppress with medications alone compared to perceptual disturbances as the former are usually associated with other constructs of living and identity in general. I've advised therapy can be perhaps more helpful in this regard with consistent " reality-testing, rapport-building, routine/boundary-setting, etc. Patient's sister is amenable for an internal referral. Anxiety symptoms are generally manageable with current interventions. Discussed elevated BP; patient's sister plans to reach out to PCP about this. I've advised he's at higher risk for CV complications that could be fatal. Thought process and content are devoid of overt aberration suggestive of acute sulema/psychosis. The patient denies SI/HI/AVH. There are no overt changes on exam today compared to most recent evaluation.  - contextual changes: meets with neurosurgery soon to discuss treatment planning  - sleep: no change  - appetite: adequate; no changes (7 lb weight gain since last visit)    I have counseled the patient with regard to diagnoses and the recommended treatment regimen as documented below. Patient acknowledges the diagnoses per my rendered interpretation. Patient demonstrates understanding of potential risks/benefits/side effects associated with this regimen and is amenable to proceed in this fashion.     Psychotherapy  - Time: 13 minutes  - interventions employed: the therapeutic alliance was strengthened to encourage the patient to express their thoughts and feelings freely. Esteem building was enhanced through praise, reassurance, normalizing/challenging, and encouragement as appropriate. Coping skills were enhanced to build distress tolerance skills and emotional regulation. Allowed patient to freely discuss issues without interruption or judgement with unconditional positive regard, active listening skills, and empathy. Provided a safe, confidential environment to facilitate the development of a positive therapeutic relationship and encourage open, honest communication. Assisted patient in processing session content; acknowledged and normalized/addressed, as appropriate, patient’s thoughts, feelings, and concerns by utilizing a person-centered approach in efforts to build  "appropriate rapport and a positive therapeutic relationship with open and honest communication.   - Diagnoses: see assessment and plan below  - Symptoms: see subjective above  - Goals   - patient: sustain improvements seen in reduced psychotic symptoms, sustain mood improvements, continue to bolster functional capacity   - provider: challenge patterns of living conducive to pathology, strengthen defenses, promote problems solving, restore adaptive functioning and provide symptom relief.  - Treatment plan: continue supportive psychotherapy in subsequent appointments to provide symptom relief; see assessment and plan below for additional details:   - iteration: 1   - progress: partial   - (X)illumination, (X)contextualization, (working)detection, (working)development, (-)elaboration, (-)refinement  - functional status: fair  - mental status exam: as below  - prognosis: fair    Psychiatric History:  Diagnoses: ID, sisters report other diagnoses haven't been rendered  Outpatient history: around 1 month ago with [facility]  Inpatient history: [facility]  Medication trials: risperidone (tremors, drooling, overly sedated), duloxetine, trazodone  Other treatment modalities: has done therapy  Presenting regimen:  mg BID (recently tried to taper and began exhibiting sexual gestures)  Self harm: denies  Suicide attempts: denies     Substance Abuse History:   Types/methods/frequency: history of alcohol abuse/dependence     Social History:  Residence: lives with his sisters back and forth, requiring 24-hour monitoring  Vocation: denies  Education: HS diploma  Pertinent developmental history: had LD, was in special educational classes; denies abuse hx  Pertinent legal history: child support violation in the past, attempted escape, assault (after throwing urine on another inmate), PI, possession of marijuana  Hobbies/interests: defer  Denominational: \"I believe in God\"  Exercise: walks  Dietary habits: defer  Sleep hygiene: " defer  Social habits: no pertinent issues  Sunlight: no concern for under-exposure  Caffeine intake: 1 cup of coffee, occasional tea, soda (3 daily)  Hydration habits: doesn't drink enough water   history: denies    Social History     Socioeconomic History    Marital status: Unknown   Tobacco Use    Smoking status: Every Day     Packs/day: 1.00     Years: 30.00     Additional pack years: 0.00     Total pack years: 30.00     Types: Cigarettes     Passive exposure: Past    Smokeless tobacco: Never   Vaping Use    Vaping Use: Never used   Substance and Sexual Activity    Alcohol use: Not Currently    Drug use: Never    Sexual activity: Not Currently     Tobacco use counseling/intervention: patient has been counseled with regard to risks of tobacco use and encouraged to consider quitting, with or without the use of adjunctive medication, by first setting a prospective quit date. Currently contemplative by transtheoretical model.     PHQ-9 Depression Screening  PHQ-9 Total Score:      Little interest or pleasure in doing things?     Feeling down, depressed, or hopeless?     Trouble falling or staying asleep, or sleeping too much?     Feeling tired or having little energy?     Poor appetite or overeating?     Feeling bad about yourself - or that you are a failure or have let yourself or your family down?     Trouble concentrating on things, such as reading the newspaper or watching television?     Moving or speaking so slowly that other people could have noticed? Or the opposite - being so fidgety or restless that you have been moving around a lot more than usual?     Thoughts that you would be better off dead, or of hurting yourself in some way?     PHQ-9 Total Score       Change in PHQ-9 since last measure: N/A (24)    ARIES-7       Change in ARIES-7 since last measure: N/A (15)    Problem List:  Patient Active Problem List   Diagnosis    Schizoaffective disorder, bipolar type    Alcohol use disorder, moderate, in  early remission, dependence    Intellectual disability    Insomnia, psychophysiological    ARIES (generalized anxiety disorder)     Allergy:   Allergies   Allergen Reactions    Haloperidol Anxiety, Mental Status Change and Irritability      Discontinued Medications:  Medications Discontinued During This Encounter   Medication Reason    metoprolol succinate XL (TOPROL-XL) 25 MG 24 hr tablet Historical Med - Therapy completed     Current Medications:   Current Outpatient Medications   Medication Sig Dispense Refill    apixaban (Eliquis) 5 MG tablet tablet Take 1 tablet by mouth Every 12 (Twelve) Hours.      atorvastatin (LIPITOR) 40 MG tablet Take 1 tablet by mouth every night at bedtime.      benztropine (COGENTIN) 1 MG tablet TAKE 1 TABLET BY MOUTH 2 TIMES A DAY AS NEEDED FOR TREMORS OR DYSTONIA      Cobalamin Combinations (B-12) 100-5000 MCG sublingual tablet Place  under the tongue.      dapagliflozin Propanediol (Farxiga) 10 MG tablet Take 1 tablet by mouth once daily 30 tablet 0    divalproex (DEPAKOTE) 500 MG DR tablet Take 1 tablet by mouth Daily AND 2 tablets Every Night. 60 tablet 1    famotidine (PEPCID) 20 MG tablet Take 1 tablet by mouth 2 (Two) Times a Day.      folic acid (FOLVITE) 1 MG tablet Take 1 tablet by mouth Daily.      glipizide (GLUCOTROL XL) 10 MG 24 hr tablet       metFORMIN (GLUCOPHAGE) 500 MG tablet Take 1 tablet by mouth 2 (Two) Times a Day With Meals.      OLANZapine (zyPREXA) 20 MG tablet Take 1 tablet by mouth Every Night. 30 tablet 1    thiamine (VITAMIN B1) 100 MG tablet       traZODone (DESYREL) 150 MG tablet Take 1 tablet by mouth Every Night. 30 tablet 1    vitamin B-12 (CYANOCOBALAMIN) 1000 MCG tablet Take 1 tablet by mouth Daily.      cloNIDine (CATAPRES) 0.1 MG tablet Take 1 tablet by mouth 2 (Two) Times a Day As Needed (anxiety/irritability). (Patient not taking: Reported on 12/11/2023)      metoprolol succinate XL (TOPROL-XL) 50 MG 24 hr tablet Take 1 tablet by mouth Daily.       metoprolol tartrate (LOPRESSOR) 50 MG tablet Take 1 tablet by mouth 2 (Two) Times a Day.       No current facility-administered medications for this visit.     Past Medical History:  Past Medical History:   Diagnosis Date    COPD (chronic obstructive pulmonary disease)     Diabetes mellitus     Hyperlipidemia     Hypertension     Hypotension     Parkinson's disease     Pneumothorax     Psychosis     Pulmonary emboli     Unresponsive episode      Past Surgical History:  Past Surgical History:   Procedure Laterality Date    SPLENECTOMY       Mental Status Exam:   Appearance: well-groomed, sits hunched, age-appropriate, above average habitus  Behavior: sedated, cooperative, appropriate eye-contact, +lacrimation without emotional impetus  Mood/affect: euthymic / mood-congruent, more responsive  Speech: reticent; appropriate rate, appropriate rhythm, dull tone; non-pressured  Thought Process: concrete, goal-directed; no FOI or BERTRAND  Thought Content: coherent, devoid of overt delusions/perceptual disturbances today, but +grandiose delusions reported in the past  SI/HI: denies both SI and HI; exhibits future-orientation, self-advocates appropriately, no regular self-harm, no appreciable intent  Memory: deficits apparent  Orientation: oriented to person  Concentration: distracted  Intellectual capacity: below average  Insight: poor by given history/exam  Judgment: poor by given history/exam  Psychomotor: appropriate  Gait: appropriate    Review of Systems:  Review of Systems   Constitutional:  Positive for activity change and appetite change. Negative for unexpected weight change.   HENT:  Negative for drooling.    Eyes:  Positive for visual disturbance.   Respiratory:  Negative for chest tightness and shortness of breath.    Cardiovascular:  Negative for chest pain and palpitations.   Gastrointestinal:  Negative for abdominal pain, diarrhea and nausea.   Endocrine: Negative for cold intolerance and heat intolerance.  "  Genitourinary:  Negative for difficulty urinating and frequency.   Musculoskeletal:  Negative for myalgias and neck stiffness.   Skin:  Negative for rash.   Neurological:  Positive for tremors. Negative for dizziness, seizures and light-headedness.     Vital Signs:   BP (!) 187/101   Pulse 67   Ht 172.7 cm (68\")   Wt 91.2 kg (201 lb)   BMI 30.56 kg/m²      Lab Results:   No results displayed because visit has over 200 results.        EKG Results:  No orders to display     Imaging Results:  CT Chest Without Contrast Diagnostic  Result Date: 7/9/2023    1. Consolidation in the lower lobes which could be reflective of a multi focal pneumonia.  Some atelectasis may also account for some of the appearance.  There is some atelectasis/scarring in the lingula. 2. Possible acute nondisplaced rib fractures on the right.     GRECIA COWAN MD             CT Head Without Contrast  Result Date: 7/9/2023    1. No acute intracranial abnormality. 2. Paranasal sinus disease worse involving the left maxillary sinus. 3. Suggested old fracture deformity of the nasal bone area.     GRECIA COWAN MD          XR Chest 1 View  Result Date: 7/9/2023    1. Cardiomegaly 2. Slight prominence of central pulmonary vascular markings that may reflect some vascular congestion.       GRECIA COWAN MD           ASSESSMENT AND PLAN:    ICD-10-CM ICD-9-CM   1. Schizoaffective disorder, bipolar type  F25.0 295.70   2. ARIES (generalized anxiety disorder)  F41.1 300.02   3. Alcohol use disorder, moderate, in early remission, dependence  F10.21 303.93   4. Intellectual disability  F79 319   5. Insomnia, psychophysiological  F51.04 307.42     53 y.o. male who presents today for follow-up regarding schizoaffective disorder, ARIES, AUD in early remission in the context of ID. We have discussed the interval history and the treatment plan below, including potential R/B/SE of the recommended regimen of which the patient demonstrates understanding. Patient is " agreeable to call 911 or go to the nearest ER should he become concerned for his own safety and/or the safety of those around him. There are no overt indices of acute sulema/psychosis on exam today.    Medication regimen: no change - continue olanzapine, VPA, trazodone, benztropine; patient is advised not to misuse prescribed medications or to use them with any exogenous substances that aren't disclosed to this provider as they may interact with the regimen to the patient's detriment.   Risk Assessment: protracted risk is high, imminent risk is low - some interval change. Do note that this is subject to change with the [employer] of new stressors, treatment non-adherence, use of substances, and/or new medical ails.   Monitoring: reviewed labs/imaging as populated above and as reported by CHANDU  Therapy: deferred  Follow-up: 6 weeks  Communications: N/A    TREATMENT PLAN/GOALS: challenge patterns of living conducive to symptom burden, implement recommended regimen as above with augmentative, intermittent supportive psychotherapy to reduce symptom burden. Patient acknowledged and verbally consented to continue treatment. The importance of adherence to the recommended treatment and interval follow-up appointments was again emphasized today: patient has good treatment adherence per given history. Patient was today reminded to limit daily caffeine intake, hydrate appropriately, eat healthy and nutritious foods, engage sleep hygiene measures, engage appropriate exposure to sunlight, engage with hobbies in balance with life necessities, and exercise appropriate to their capacity to do so.     Billing: This encounter is of moderate complexity based on number/complexity of problems addressed today, amount/complexity of data reviewed today, risk of complications/morbidity: 2+ stable chronic illnesses and prescription management.     Parts of this note are electronic transcriptions/translations of spoken language to printed text  using the Dragon Dictation system.    Electronically signed by Jay Jay Boss MD, 12/11/23, [id number]
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
## 2023-12-15 ENCOUNTER — TELEMEDICINE (OUTPATIENT)
Dept: BEHAVIORAL HEALTH | Facility: CLINIC | Age: 53
End: 2023-12-15
Payer: MEDICARE

## 2023-12-15 DIAGNOSIS — F79 INTELLECTUAL DISABILITY: ICD-10-CM

## 2023-12-15 DIAGNOSIS — F25.0 SCHIZOAFFECTIVE DISORDER, BIPOLAR TYPE: Primary | ICD-10-CM

## 2023-12-19 NOTE — PROGRESS NOTES
Progress Note    Date: 12/26/2023  Time In: 11:00  Time Out: 11:17    Patient Legal Name: Alec Medrano  Patient Age: 53 y.o.    Mode of visit: Video  Location of provider: [address] [place] [address] , [address], [place] [address]  Location of patient: Simran's (sister) home    Patient is seen remotely using [facility]. Patient is being seen via telehealth and patient confirms that they are in a secure environment for this session. The patient's condition being diagnosed/treated is appropriate for telemedicine. The provider identified herself as well as her credentials. The patient gave consent to be seen remotely, and when consent is given they understand that the consent allows for patient identifiable information to be sent to a third party as needed. They may refuse to be seen remotely at any time. The electronic data is encrypted and password protected, and the patient has been advised of the potential risks to privacy not withstanding such measures. Patient consented to the use of video for the purpose of a telehealth session today. The visit included audio and video interaction. No technical issues occurred during this visit.    CHIEF COMPLAINT: Intellectual disability, Schizoaffective d/o     Subjective   History of Present Illness   Alec is a 53 y.o. male presenting for continuing psychotherapy. Patient's sister, Simran, was also present during session. Patient consented verbally to allow his sister to collaborate. Patient denied having any depression, anxiety, or delusion symptoms at this time.Therapist spoke with Simran regarding patient's symptoms and she reported he has not been having delusions since his medication was increased and that she is not sure he needs therapy services now. Simran was in agreement with therapist checking in on a monthly basis to monitor for delusions or other changes requiring therapy services. Simran expressed doubts about patient's ability to benefit from psychotherapy due to his  intellectual deficits. Patient is voluntarily requesting to participate in outpatient therapy at [facility].      History obtained from referring provider's note on 12/11/23:  Psychiatric History:  Diagnoses: ID, sisters report other diagnoses haven't been rendered  Outpatient history: around 1 month ago with [facility]  Inpatient history: [facility]  Medication trials: risperidone (tremors, drooling, overly sedated), duloxetine, trazodone  Other treatment modalities: has done therapy  Presenting regimen:  mg BID (recently tried to taper and began exhibiting sexual gestures)  Self harm: denies  Suicide attempts: denies    Assessment    Mental Status Exam     Appearance: appeared to have good hygiene  Behavior: calm  Cooperation:  engaged, cooperative, attentive, and friendly  Eye Contact:  good  Affect:  bright  Mood: happy  Speech: responsive  Thought Process:  organized  Thought Content: appropriate  Suicidal: denies  Homicidal:  denies  Hallucinations:  denies  Memory:  intact  Orientation:  person, place, time, and situation  Reliability:  reliable  Insight:  good  Judgment:  good     Clinical Intervention       ICD-10-CM ICD-9-CM   1. Intellectual disability  F79 319   2. Schizoaffective disorder, bipolar type  F25.0 295.70        Individual psychotherapy was provided utilizing SFT and problem-slving techniques to build rapport, review treatment objectives, and identify goals for treatment. Patient rated depression and anxiety symptoms at 0 on a 0-10 scale where 0=no symptoms and Simran reported his delusions have been in remission since meds were increased. Therapist recommended a few monthly check-ins to monitor for changes in patient's mental health status and Simran agreed to this approach.       Plan   Plan & Goals     Follow up in one month to check patient status.    Patient acknowledged and verbally consented to continue working toward resolving current treatment plan goals and  was educated on the importance of participation in the therapeutic process.  Patient will remain compliant with medication regimen as prescribed. Discuss any medication side effects, questions or concerns with prescribing provider.  Call 911 or present to the nearest emergency room in an emergency situation.   National Suicide Prevention Lifeline: Call 988. The Lifeline provides 24/7, free and confidential support for people in distress, prevention and crisis resources.  Crisis Text Line  Text HOME To 172913    Return in about 1 month (around 1/26/2024).    ____________________  This document has been electronically signed by Rosaline Morales LCSW  December 26, 2023 11:39 EST    Part of this note may be an electronic transcription/translation of spoken language to printed text using the Dragon Dictation System.

## 2023-12-23 DIAGNOSIS — F51.04 INSOMNIA, PSYCHOPHYSIOLOGICAL: ICD-10-CM

## 2023-12-23 DIAGNOSIS — F25.0 SCHIZOAFFECTIVE DISORDER, BIPOLAR TYPE: ICD-10-CM

## 2023-12-26 ENCOUNTER — TELEMEDICINE (OUTPATIENT)
Dept: BEHAVIORAL HEALTH | Facility: CLINIC | Age: 53
End: 2023-12-26
Payer: MEDICARE

## 2023-12-26 ENCOUNTER — TELEPHONE (OUTPATIENT)
Dept: BEHAVIORAL HEALTH | Facility: CLINIC | Age: 53
End: 2023-12-26
Payer: MEDICARE

## 2023-12-26 DIAGNOSIS — F79 INTELLECTUAL DISABILITY: Primary | ICD-10-CM

## 2023-12-26 DIAGNOSIS — F25.0 SCHIZOAFFECTIVE DISORDER, BIPOLAR TYPE: ICD-10-CM

## 2023-12-26 RX ORDER — TRAZODONE HYDROCHLORIDE 150 MG/1
150 TABLET ORAL NIGHTLY
Qty: 30 TABLET | Refills: 1 | OUTPATIENT
Start: 2023-12-26

## 2023-12-26 NOTE — TELEPHONE ENCOUNTER
NEXT VISIT WITH PROVIDER   Appointment with Jay Jay Boss MD (01/24/2024)     LAST SEEN BY PROVIDER   Office Visit with Jay Jay Boss MD (12/11/2023)     LAST MED REFILL  traZODone (DESYREL) 150 MG tablet (11/29/2023)   Dispense Quantity: 30 tablet Refills: 1          Sig: Take 1 tablet by mouth Every Night.     TOO SOON FOR REFILL     PROVIDER PLEASE ADVISE

## 2023-12-29 RX ORDER — DIVALPROEX SODIUM 500 MG/1
TABLET, DELAYED RELEASE ORAL
Qty: 60 TABLET | Refills: 1 | Status: SHIPPED | OUTPATIENT
Start: 2023-12-29

## 2023-12-29 RX ORDER — TRAZODONE HYDROCHLORIDE 150 MG/1
150 TABLET ORAL NIGHTLY
Qty: 30 TABLET | Refills: 1 | Status: SHIPPED | OUTPATIENT
Start: 2023-12-29

## 2024-01-24 ENCOUNTER — OFFICE VISIT (OUTPATIENT)
Dept: PSYCHIATRY | Facility: CLINIC | Age: 54
End: 2024-01-24
Payer: MEDICARE

## 2024-01-24 VITALS
DIASTOLIC BLOOD PRESSURE: 112 MMHG | SYSTOLIC BLOOD PRESSURE: 189 MMHG | HEIGHT: 68 IN | HEART RATE: 72 BPM | BODY MASS INDEX: 30.71 KG/M2 | WEIGHT: 202.6 LBS

## 2024-01-24 DIAGNOSIS — F25.0 SCHIZOAFFECTIVE DISORDER, BIPOLAR TYPE: Primary | ICD-10-CM

## 2024-01-24 DIAGNOSIS — F51.04 INSOMNIA, PSYCHOPHYSIOLOGICAL: ICD-10-CM

## 2024-01-24 DIAGNOSIS — F41.1 GAD (GENERALIZED ANXIETY DISORDER): ICD-10-CM

## 2024-01-24 DIAGNOSIS — F79 INTELLECTUAL DISABILITY: ICD-10-CM

## 2024-01-24 RX ORDER — OLANZAPINE 20 MG/1
20 TABLET ORAL NIGHTLY
Qty: 90 TABLET | Refills: 0 | Status: SHIPPED | OUTPATIENT
Start: 2024-01-24

## 2024-01-24 RX ORDER — DIVALPROEX SODIUM 500 MG/1
TABLET, DELAYED RELEASE ORAL
Qty: 270 TABLET | Refills: 0 | Status: SHIPPED | OUTPATIENT
Start: 2024-01-24

## 2024-01-24 RX ORDER — TRAZODONE HYDROCHLORIDE 300 MG/1
300 TABLET ORAL NIGHTLY
Qty: 90 TABLET | Refills: 0 | Status: SHIPPED | OUTPATIENT
Start: 2024-01-24

## 2024-01-24 NOTE — PROGRESS NOTES
"[facility]  Follow-up Visit    Chief Complaint: \"He was in the hospital back in August... psychotic break... that's what we're calling it... transported to the hospital from [facility]... unresponsive in a cell... (low pulse, BP)... he was telling everybody he was [place] and he was God... seen someone at [facility] for tremors because of the medications... the medications he's on, he's a zombie, he can't do anything... recently had an MRI showing that his pituitary gland is thickening... virtual visit... [facility] and he had decreased the Risperdal... he made sexual gestures.\"     History of Present Illness: Alec Medrano is a 53 y.o. male who presents today for follow-up regarding schizoaffective disorder, ARIES, AUD in early remission in the context of ID. Last seen: 12/11 at which time no changes were made to his regimen. He presents accompanied by his sister in no acute distress and engages with me appropriately. Psychotropic regimen perceived to be effective. Side-effects per given history: lacrimation (tolerable).    Current treatment regimen includes:   - olanzapine 20 mg HS  -  mg QD + 1000 mg HS  - trazodone 150 mg HS  - benztropine 1 mg BID PRN tremors    Today the patient continues to do better than compared to the past. Schizoaffective symptoms are adequately managed with current interventions. Hallucinations, delusions have been less prominent. Anxiety symptoms are generally manageable with current interventions. Discussed elevated BP; will meet with PCP 02/01 - they feel this might be related to anxiety with engaging with healthcare professionals. I've advised he's at higher risk for CV complications that could be fatal. He's met with Rosaline and likes her, plans to meet at a maximum once monthly; I think this is appropriate - purpose of therapy from my perspective is consistent reality-testing, rapport-building with ventilation outlet, " routine/boundary-setting, etc. Thought process and content are devoid of overt aberration suggestive of acute sulema/psychosis. The patient denies SI/HI/AVH. There are no overt changes on exam today compared to most recent evaluation.  - contextual changes: spinal tap planned for the near future  - sleep: poor as of late  - appetite: adequate; no changes (7 lb weight gain since last visit)    I have counseled the patient with regard to diagnoses and the recommended treatment regimen as documented below. Patient acknowledges the diagnoses per my rendered interpretation. Patient demonstrates understanding of potential risks/benefits/side effects associated with this regimen and is amenable to proceed in this fashion.     Psychotherapy  - Time: 13 minutes  - interventions employed: the therapeutic alliance was strengthened to encourage the patient to express their thoughts and feelings freely. Esteem building was enhanced through praise, reassurance, normalizing/challenging, and encouragement as appropriate. Coping skills were enhanced to build distress tolerance skills and emotional regulation. Allowed patient to freely discuss issues without interruption or judgement with unconditional positive regard, active listening skills, and empathy. Provided a safe, confidential environment to facilitate the development of a positive therapeutic relationship and encourage open, honest communication. Assisted patient in processing session content; acknowledged and normalized/addressed, as appropriate, patient’s thoughts, feelings, and concerns by utilizing a person-centered approach in efforts to build appropriate rapport and a positive therapeutic relationship with open and honest communication.   - Diagnoses: see assessment and plan below  - Symptoms: see subjective above  - Goals   - patient: sustain improvements seen in reduced psychotic symptoms, sustain mood improvements, continue to bolster functional capacity   -  "provider: challenge patterns of living conducive to pathology, strengthen defenses, promote problems solving, restore adaptive functioning and provide symptom relief.  - Treatment plan: continue supportive psychotherapy in subsequent appointments to provide symptom relief; see assessment and plan below for additional details:   - iteration: 1   - progress: partial   - (X)illumination, (X)contextualization, (working)detection, (working)development, (-)elaboration, (-)refinement  - functional status: fair  - mental status exam: as below  - prognosis: fair    Psychiatric History:  Diagnoses: ID, sisters report other diagnoses haven't been rendered  Outpatient history: around 1 month ago with [facility]  Inpatient history: [facility]  Medication trials: risperidone (tremors, drooling, overly sedated), duloxetine, trazodone  Other treatment modalities: has done therapy  Presenting regimen:  mg BID (recently tried to taper and began exhibiting sexual gestures)  Self harm: denies  Suicide attempts: denies     Substance Abuse History:   Types/methods/frequency: history of alcohol abuse/dependence     Social History:  Residence: lives with his sisters back and forth, requiring 24-hour monitoring  Vocation: denies  Education: HS diploma  Pertinent developmental history: had LD, was in special educational classes; denies abuse hx  Pertinent legal history: child support violation in the past, attempted escape, assault (after throwing urine on another inmate), PI, possession of marijuana  Hobbies/interests: defer  Shinto: \"I believe in God\"  Exercise: walks  Dietary habits: defer  Sleep hygiene: defer  Social habits: no pertinent issues  Sunlight: no concern for under-exposure  Caffeine intake: 1 cup of coffee, occasional tea, soda (3 daily)  Hydration habits: doesn't drink enough water   history: denies    Social History     Socioeconomic History    Marital status: Unknown   Tobacco Use    Smoking status: Every " Day     Packs/day: 1.00     Years: 30.00     Additional pack years: 0.00     Total pack years: 30.00     Types: Cigarettes     Passive exposure: Past    Smokeless tobacco: Never   Vaping Use    Vaping Use: Never used   Substance and Sexual Activity    Alcohol use: Not Currently    Drug use: Never    Sexual activity: Not Currently     Tobacco use counseling/intervention: patient has been counseled with regard to risks of tobacco use and encouraged to consider quitting, with or without the use of adjunctive medication, by first setting a prospective quit date. Currently contemplative by transtheoretical model.     PHQ-9 Depression Screening  PHQ-9 Total Score:      Little interest or pleasure in doing things?     Feeling down, depressed, or hopeless?     Trouble falling or staying asleep, or sleeping too much?     Feeling tired or having little energy?     Poor appetite or overeating?     Feeling bad about yourself - or that you are a failure or have let yourself or your family down?     Trouble concentrating on things, such as reading the newspaper or watching television?     Moving or speaking so slowly that other people could have noticed? Or the opposite - being so fidgety or restless that you have been moving around a lot more than usual?     Thoughts that you would be better off dead, or of hurting yourself in some way?     PHQ-9 Total Score       Change in PHQ-9 since last measure: N/A (24)    ARIES-7       Change in ARIES-7 since last measure: N/A (15)    Problem List:  Patient Active Problem List   Diagnosis    Schizoaffective disorder, bipolar type    Alcohol use disorder, moderate, in early remission, dependence    Intellectual disability    Insomnia, psychophysiological    ARIES (generalized anxiety disorder)     Allergy:   Allergies   Allergen Reactions    Haloperidol Anxiety, Mental Status Change and Irritability      Discontinued Medications:  Medications Discontinued During This Encounter   Medication Reason     OLANZapine (zyPREXA) 20 MG tablet Reorder    divalproex (DEPAKOTE) 500 MG DR tablet Reorder    traZODone (DESYREL) 150 MG tablet Reorder       Current Medications:   Current Outpatient Medications   Medication Sig Dispense Refill    apixaban (Eliquis) 5 MG tablet tablet Take 1 tablet by mouth Every 12 (Twelve) Hours.      atorvastatin (LIPITOR) 40 MG tablet Take 1 tablet by mouth every night at bedtime.      benztropine (COGENTIN) 1 MG tablet TAKE 1 TABLET BY MOUTH 2 TIMES A DAY AS NEEDED FOR TREMORS OR DYSTONIA      Cobalamin Combinations (B-12) 100-5000 MCG sublingual tablet Place  under the tongue.      famotidine (PEPCID) 20 MG tablet Take 1 tablet by mouth 2 (Two) Times a Day.      folic acid (FOLVITE) 1 MG tablet Take 1 tablet by mouth Daily.      glipizide (GLUCOTROL XL) 10 MG 24 hr tablet       metFORMIN (GLUCOPHAGE) 500 MG tablet Take 1 tablet by mouth 2 (Two) Times a Day With Meals.      metoprolol succinate XL (TOPROL-XL) 50 MG 24 hr tablet Take 1 tablet by mouth Daily.      metoprolol tartrate (LOPRESSOR) 50 MG tablet Take 1 tablet by mouth 2 (Two) Times a Day.      thiamine (VITAMIN B1) 100 MG tablet       vitamin B-12 (CYANOCOBALAMIN) 1000 MCG tablet Take 1 tablet by mouth Daily.      cloNIDine (CATAPRES) 0.1 MG tablet Take 1 tablet by mouth 2 (Two) Times a Day As Needed (anxiety/irritability). (Patient not taking: Reported on 12/11/2023)      dapagliflozin Propanediol (Farxiga) 10 MG tablet Take 1 tablet by mouth once daily 30 tablet 0    divalproex (DEPAKOTE) 500 MG DR tablet Take 1 tablet by mouth Daily AND 2 tablets Every Night. 270 tablet 0    OLANZapine (zyPREXA) 20 MG tablet Take 1 tablet by mouth Every Night. 90 tablet 0    traZODone (DESYREL) 300 MG tablet Take 1 tablet by mouth Every Night. 90 tablet 0     No current facility-administered medications for this visit.     Past Medical History:  Past Medical History:   Diagnosis Date    COPD (chronic obstructive pulmonary disease)      "Diabetes mellitus     Hyperlipidemia     Hypertension     Hypotension     Parkinson's disease     Pneumothorax     Psychosis     Pulmonary emboli     Unresponsive episode      Past Surgical History:  Past Surgical History:   Procedure Laterality Date    SPLENECTOMY       Mental Status Exam:   Appearance: well-groomed, sits hunched, age-appropriate, above average habitus  Behavior: sedated, cooperative, appropriate eye-contact  Mood/affect: euthymic / mood-congruent, more responsive  Speech: reticent; appropriate rate, appropriate rhythm, dull tone; non-pressured  Thought Process: concrete, goal-directed; no FOI or BERTRAND  Thought Content: coherent, devoid of overt delusions/perceptual disturbances today, but +grandiose delusions reported in the past  SI/HI: denies both SI and HI; exhibits future-orientation, self-advocates appropriately, no regular self-harm, no appreciable intent  Memory: deficits apparent  Orientation: oriented to person  Concentration: distracted  Intellectual capacity: below average  Insight: poor by given history/exam  Judgment: poor by given history/exam  Psychomotor: appropriate  Gait: appropriate    Review of Systems:  Review of Systems   Constitutional:  Negative for activity change, appetite change and unexpected weight change.   HENT:  Negative for drooling.    Eyes:  Negative for visual disturbance.   Respiratory:  Negative for chest tightness and shortness of breath.    Cardiovascular:  Negative for chest pain and palpitations.   Gastrointestinal:  Negative for abdominal pain, diarrhea and nausea.   Endocrine: Negative for cold intolerance and heat intolerance.   Genitourinary:  Negative for difficulty urinating and frequency.   Musculoskeletal:  Negative for myalgias and neck stiffness.   Skin:  Negative for rash.   Neurological:  Negative for dizziness, tremors, seizures and light-headedness.     Vital Signs:   BP (!) 189/112   Pulse 72   Ht 172.7 cm (67.99\")   Wt 91.9 kg (202 lb 9.6 " oz)   BMI 30.81 kg/m²      Lab Results:   No results displayed because visit has over 200 results.        EKG Results:  No orders to display     Imaging Results:  CT Chest Without Contrast Diagnostic  Result Date: 7/9/2023    1. Consolidation in the lower lobes which could be reflective of a multi focal pneumonia.  Some atelectasis may also account for some of the appearance.  There is some atelectasis/scarring in the lingula. 2. Possible acute nondisplaced rib fractures on the right.     GRECIA COWAN MD             CT Head Without Contrast  Result Date: 7/9/2023    1. No acute intracranial abnormality. 2. Paranasal sinus disease worse involving the left maxillary sinus. 3. Suggested old fracture deformity of the nasal bone area.     GRECIA COWAN MD          XR Chest 1 View  Result Date: 7/9/2023    1. Cardiomegaly 2. Slight prominence of central pulmonary vascular markings that may reflect some vascular congestion.       GRECIA COWAN MD           ASSESSMENT AND PLAN:    ICD-10-CM ICD-9-CM   1. Schizoaffective disorder, bipolar type  F25.0 295.70   2. ARIES (generalized anxiety disorder)  F41.1 300.02   3. Insomnia, psychophysiological  F51.04 307.42   4. Intellectual disability  F79 319     53 y.o. male who presents today for follow-up regarding schizoaffective disorder, ARIES, AUD in early remission in the context of ID. We have discussed the interval history and the treatment plan below, including potential R/B/SE of the recommended regimen of which the patient demonstrates understanding. Patient is agreeable to call 911 or go to the nearest ER should he become concerned for his own safety and/or the safety of those around him. There are no overt indices of acute sulema/psychosis on exam today.    Medication regimen: no change - continue olanzapine, VPA, trazodone, benztropine; patient is advised not to misuse prescribed medications or to use them with any exogenous substances that aren't disclosed to this provider  as they may interact with the regimen to the patient's detriment.   Risk Assessment: protracted risk is high, imminent risk is low - some interval change. Do note that this is subject to change with the [employer] of new stressors, treatment non-adherence, use of substances, and/or new medical ails.   Monitoring: reviewed labs/imaging as populated above and as reported by [facility]  Therapy: deferred  Follow-up: 3 months  Communications: N/A    TREATMENT PLAN/GOALS: challenge patterns of living conducive to symptom burden, implement recommended regimen as above with augmentative, intermittent supportive psychotherapy to reduce symptom burden. Patient acknowledged and verbally consented to continue treatment. The importance of adherence to the recommended treatment and interval follow-up appointments was again emphasized today: patient has good treatment adherence per given history. Patient was today reminded to limit daily caffeine intake, hydrate appropriately, eat healthy and nutritious foods, engage sleep hygiene measures, engage appropriate exposure to sunlight, engage with hobbies in balance with life necessities, and exercise appropriate to their capacity to do so.     Billing: This encounter is of moderate complexity based on number/complexity of problems addressed today, amount/complexity of data reviewed today, risk of complications/morbidity: 2+ stable chronic illnesses and prescription management.     Parts of this note are electronic transcriptions/translations of spoken language to printed text using the Dragon Dictation system.    Electronically signed by Jay Jay Boss MD, 01/24/24, [id number]

## 2024-01-25 ENCOUNTER — TELEMEDICINE (OUTPATIENT)
Dept: BEHAVIORAL HEALTH | Facility: CLINIC | Age: 54
End: 2024-01-25
Payer: MEDICARE

## 2024-01-25 DIAGNOSIS — F79 INTELLECTUAL DISABILITY: Primary | ICD-10-CM

## 2024-01-25 DIAGNOSIS — F25.0 SCHIZOAFFECTIVE DISORDER, BIPOLAR TYPE: ICD-10-CM

## 2024-01-25 NOTE — PATIENT INSTRUCTIONS
This is the information about the [employer] GLEZ Waiver I told you about today:      Contact Information  [facility] ([facility])  DMS   [email or web address]    [facility] [address]  [place] [address]  [phone]

## 2024-01-26 ENCOUNTER — TELEPHONE (OUTPATIENT)
Dept: PSYCHIATRY | Facility: CLINIC | Age: 54
End: 2024-01-26
Payer: MEDICARE

## 2024-02-03 DIAGNOSIS — F25.0 SCHIZOAFFECTIVE DISORDER, BIPOLAR TYPE: ICD-10-CM

## 2024-02-03 DIAGNOSIS — F51.04 INSOMNIA, PSYCHOPHYSIOLOGICAL: ICD-10-CM

## 2024-02-05 RX ORDER — DIVALPROEX SODIUM 500 MG/1
TABLET, DELAYED RELEASE ORAL
Qty: 120 TABLET | Refills: 3 | OUTPATIENT
Start: 2024-02-05

## 2024-02-05 NOTE — TELEPHONE ENCOUNTER
NEXT VISIT WITH PROVIDER   Appointment with Jay Jay Boss MD (04/24/2024)     LAST SEEN BY PROVIDER   Office Visit with Jay Jay Boss MD (01/24/2024)     LAST MED REFILL  divalproex (DEPAKOTE) 500 MG DR tablet (01/24/2024)   Dispense Quantity: 270 tablet Refills: 0    Note to Pharmacy: Refill to place on file         Sig: Take 1 tablet by mouth Daily AND 2 tablets Every Night     PROVIDER PLEASE ADVISE

## 2024-02-21 NOTE — PROGRESS NOTES
"Progress Note    Date: 02/23/2024  Time In: 11:00  Time Out: 11:33    Patient Legal Name: Alec Medrano  Patient Age: 53 y.o.    Mode of visit: Video  Location of provider: [address], [place] [address]  Location of patient: in car    Patient is seen remotely using [employer]. Patient is being seen via telehealth and patient confirms that they are in a secure environment for this session. The patient's condition being diagnosed/treated is appropriate for telemedicine. The provider identified herself as well as her credentials. The patient gave consent to be seen remotely, and when consent is given they understand that the consent allows for patient identifiable information to be sent to a third party as needed. They may refuse to be seen remotely at any time. The electronic data is encrypted and password protected, and the patient has been advised of the potential risks to privacy not withstanding such measures. Patient consented to the use of video for the purpose of a telehealth session today. The visit included audio and video interaction. There were technical issues during this visit due to patient being in moving vehicle and losing connection often.    CHIEF COMPLAINT:  schizoaffective d/o, intellectual disability    Subjective   History of Present Illness   Alec is a 53 y.o. male who presents today as a follow-up for continued psychotherapy.  Patient's sister, Simran, was also present in the car with patient.Patient reported he has had some delusions of looking for someone and  got called because he went and looked in neighbor's windows.  Patient's sister stated his short term memory is not there and that he talks out loud to himself.. Patient shared he has marcelino doing chores around the house now.  Patient advised he has been writing songs about \"[place] stuff.\" Patient stated he is actually [place] and that therapist is talking to someone famous.  . Patient is voluntarily requesting to " participate in outpatient therapy at [facility].      From previous progress note on 12/11/23:  Psychiatric History:  Diagnoses: ID, sisters report other diagnoses haven't been rendered  Outpatient history: around 1 month ago with [facility]  Inpatient history: [facility]  Medication trials: risperidone (tremors, drooling, overly sedated), duloxetine, trazodone  Other treatment modalities: has done therapy  Presenting regimen:  mg BID (recently tried to taper and began exhibiting sexual gestures)  Self harm: denies  Suicide attempts: denies    Patient was talking out loud to himself during visit.     Assessment    Mental Status Exam     Appearance: appeared to have good hygiene  Behavior: calm  Cooperation:  engaged, cooperative, attentive, and friendly  Eye Contact:  good  Affect:  bright  Mood: expressive  Speech: responsive  Thought Process:   mostly organized but he did refer to himself as [place]  Thought Content:  mostly appropriate but he did refer to himself as [place]  Suicidal: denies  Homicidal:  denies  Hallucinations:  after recent meds adjustment he had an incident of thinking his cousin was next door and was looking in the neighbor's windows looking for the cousin  Memory:  his sister reported he struggles with short-term memory  Orientation:  person, place, time, and situation  Reliability:  reliable  Insight:  limited  Judgment:  limited    Clinical Intervention       ICD-10-CM ICD-9-CM   1. Schizoaffective disorder, bipolar type  F25.0 295.70   2. Intellectual disability  F79 319        Individual psychotherapy was provided utilizing person-centered techniques to build rapport, encourage expression of thoughts and feelings, and assess symptoms.  Therapist utilized open-ended questions to encourage the development of a positive therapeutic relationship and open communication.  Therapist normalized/validated patient’s thoughts and feelings as appropriate. Discussed  moving patient to PRN as he is doing well on medication and his sister is satisfied with his bx. Recommended contacting Dr. Prado if there are more breakthrough incidents with bx.     Plan   Plan & Goals     Patient is being moved to PRN status.    Patient acknowledged and verbally consented to continue working toward resolving current treatment plan goals and was educated on the importance of participation in the therapeutic process.  Patient will remain compliant with medication regimen as prescribed. Discuss any medication side effects, questions or concerns with prescribing provider.  Call 911 or present to the nearest emergency room in an emergency situation.   National Suicide Prevention Lifeline: Call 988. The Lifeline provides 24/7, free and confidential support for people in distress, prevention and crisis resources.  Crisis Text Line  Text HOME To 541991    Return if symptoms worsen or fail to improve.    ____________________  This document has been electronically signed by Rosaline Morales LCSW  February 23, 2024 11:59 EST    Part of this note may be an electronic transcription/translation of spoken language to printed text using the Dragon Dictation System.

## 2024-02-23 ENCOUNTER — TELEMEDICINE (OUTPATIENT)
Dept: BEHAVIORAL HEALTH | Facility: CLINIC | Age: 54
End: 2024-02-23
Payer: MEDICARE

## 2024-02-23 DIAGNOSIS — F79 INTELLECTUAL DISABILITY: ICD-10-CM

## 2024-02-23 DIAGNOSIS — F25.0 SCHIZOAFFECTIVE DISORDER, BIPOLAR TYPE: Primary | ICD-10-CM

## 2024-04-24 ENCOUNTER — OFFICE VISIT (OUTPATIENT)
Dept: PSYCHIATRY | Facility: CLINIC | Age: 54
End: 2024-04-24
Payer: MEDICARE

## 2024-04-24 ENCOUNTER — LAB (OUTPATIENT)
Dept: LAB | Facility: HOSPITAL | Age: 54
End: 2024-04-24
Payer: MEDICARE

## 2024-04-24 VITALS
DIASTOLIC BLOOD PRESSURE: 101 MMHG | SYSTOLIC BLOOD PRESSURE: 174 MMHG | HEART RATE: 79 BPM | WEIGHT: 211.6 LBS | HEIGHT: 68 IN | BODY MASS INDEX: 32.07 KG/M2

## 2024-04-24 DIAGNOSIS — F41.1 GAD (GENERALIZED ANXIETY DISORDER): ICD-10-CM

## 2024-04-24 DIAGNOSIS — F51.04 INSOMNIA, PSYCHOPHYSIOLOGICAL: ICD-10-CM

## 2024-04-24 DIAGNOSIS — F79 INTELLECTUAL DISABILITY: ICD-10-CM

## 2024-04-24 DIAGNOSIS — F25.0 SCHIZOAFFECTIVE DISORDER, BIPOLAR TYPE: ICD-10-CM

## 2024-04-24 DIAGNOSIS — Z51.81 THERAPEUTIC DRUG MONITORING: ICD-10-CM

## 2024-04-24 DIAGNOSIS — F25.0 SCHIZOAFFECTIVE DISORDER, BIPOLAR TYPE: Primary | ICD-10-CM

## 2024-04-24 DIAGNOSIS — F10.21 ALCOHOL USE DISORDER, MODERATE, IN EARLY REMISSION, DEPENDENCE: ICD-10-CM

## 2024-04-24 LAB
ALBUMIN SERPL-MCNC: 4.3 G/DL (ref 3.5–5.2)
ALBUMIN/GLOB SERPL: 1.4 G/DL
ALP SERPL-CCNC: 99 U/L (ref 39–117)
ALT SERPL W P-5'-P-CCNC: 27 U/L (ref 1–41)
ANION GAP SERPL CALCULATED.3IONS-SCNC: 8.2 MMOL/L (ref 5–15)
AST SERPL-CCNC: 26 U/L (ref 1–40)
BASOPHILS # BLD AUTO: 0.07 10*3/MM3 (ref 0–0.2)
BASOPHILS NFR BLD AUTO: 0.4 % (ref 0–1.5)
BILIRUB SERPL-MCNC: 0.2 MG/DL (ref 0–1.2)
BUN SERPL-MCNC: 19 MG/DL (ref 6–20)
BUN/CREAT SERPL: 16.2 (ref 7–25)
CALCIUM SPEC-SCNC: 10.5 MG/DL (ref 8.6–10.5)
CHLORIDE SERPL-SCNC: 105 MMOL/L (ref 98–107)
CO2 SERPL-SCNC: 28.8 MMOL/L (ref 22–29)
CREAT SERPL-MCNC: 1.17 MG/DL (ref 0.76–1.27)
DEPRECATED RDW RBC AUTO: 46.3 FL (ref 37–54)
EGFRCR SERPLBLD CKD-EPI 2021: 74.5 ML/MIN/1.73
EOSINOPHIL # BLD AUTO: 0.41 10*3/MM3 (ref 0–0.4)
EOSINOPHIL NFR BLD AUTO: 2.6 % (ref 0.3–6.2)
ERYTHROCYTE [DISTWIDTH] IN BLOOD BY AUTOMATED COUNT: 13.8 % (ref 12.3–15.4)
GLOBULIN UR ELPH-MCNC: 3 GM/DL
GLUCOSE SERPL-MCNC: 136 MG/DL (ref 65–99)
HCT VFR BLD AUTO: 52.8 % (ref 37.5–51)
HGB BLD-MCNC: 17.5 G/DL (ref 13–17.7)
IMM GRANULOCYTES # BLD AUTO: 0.1 10*3/MM3 (ref 0–0.05)
IMM GRANULOCYTES NFR BLD AUTO: 0.6 % (ref 0–0.5)
LYMPHOCYTES # BLD AUTO: 4.33 10*3/MM3 (ref 0.7–3.1)
LYMPHOCYTES NFR BLD AUTO: 27.3 % (ref 19.6–45.3)
MCH RBC QN AUTO: 30.5 PG (ref 26.6–33)
MCHC RBC AUTO-ENTMCNC: 33.1 G/DL (ref 31.5–35.7)
MCV RBC AUTO: 92.1 FL (ref 79–97)
MONOCYTES # BLD AUTO: 1.36 10*3/MM3 (ref 0.1–0.9)
MONOCYTES NFR BLD AUTO: 8.6 % (ref 5–12)
NEUTROPHILS NFR BLD AUTO: 60.5 % (ref 42.7–76)
NEUTROPHILS NFR BLD AUTO: 9.58 10*3/MM3 (ref 1.7–7)
NRBC BLD AUTO-RTO: 0 /100 WBC (ref 0–0.2)
PLATELET # BLD AUTO: 287 10*3/MM3 (ref 140–450)
PMV BLD AUTO: 10.7 FL (ref 6–12)
POTASSIUM SERPL-SCNC: 5.1 MMOL/L (ref 3.5–5.2)
PROT SERPL-MCNC: 7.3 G/DL (ref 6–8.5)
RBC # BLD AUTO: 5.73 10*6/MM3 (ref 4.14–5.8)
SODIUM SERPL-SCNC: 142 MMOL/L (ref 136–145)
VALPROATE SERPL-MCNC: 28 MCG/ML (ref 50–125)
WBC NRBC COR # BLD AUTO: 15.85 10*3/MM3 (ref 3.4–10.8)

## 2024-04-24 PROCEDURE — 99214 OFFICE O/P EST MOD 30 MIN: CPT | Performed by: PSYCHIATRY & NEUROLOGY

## 2024-04-24 PROCEDURE — 1159F MED LIST DOCD IN RCRD: CPT | Performed by: PSYCHIATRY & NEUROLOGY

## 2024-04-24 PROCEDURE — 1160F RVW MEDS BY RX/DR IN RCRD: CPT | Performed by: PSYCHIATRY & NEUROLOGY

## 2024-04-24 PROCEDURE — 80164 ASSAY DIPROPYLACETIC ACD TOT: CPT | Performed by: PSYCHIATRY & NEUROLOGY

## 2024-04-24 PROCEDURE — 36415 COLL VENOUS BLD VENIPUNCTURE: CPT | Performed by: PSYCHIATRY & NEUROLOGY

## 2024-04-24 PROCEDURE — 85025 COMPLETE CBC W/AUTO DIFF WBC: CPT | Performed by: PSYCHIATRY & NEUROLOGY

## 2024-04-24 PROCEDURE — 80053 COMPREHEN METABOLIC PANEL: CPT | Performed by: PSYCHIATRY & NEUROLOGY

## 2024-04-24 RX ORDER — DIVALPROEX SODIUM 500 MG/1
TABLET, DELAYED RELEASE ORAL
Qty: 270 TABLET | Refills: 1 | Status: SHIPPED | OUTPATIENT
Start: 2024-04-24

## 2024-04-24 RX ORDER — DIVALPROEX SODIUM 500 MG/1
TABLET, DELAYED RELEASE ORAL
Qty: 270 TABLET | Refills: 0 | Status: CANCELLED | OUTPATIENT
Start: 2024-04-24

## 2024-04-24 RX ORDER — GLYCOPYRROLATE AND FORMOTEROL FUMARATE 9; 4.8 UG/1; UG/1
2 AEROSOL, METERED RESPIRATORY (INHALATION) 2 TIMES DAILY
COMMUNITY
Start: 2024-04-10

## 2024-04-24 RX ORDER — GLYCOPYRROLATE AND FORMOTEROL FUMARATE 9; 4.8 UG/1; UG/1
AEROSOL, METERED RESPIRATORY (INHALATION)
COMMUNITY
Start: 2024-02-08

## 2024-04-24 RX ORDER — OLANZAPINE 20 MG/1
20 TABLET ORAL NIGHTLY
Qty: 90 TABLET | Refills: 1 | Status: SHIPPED | OUTPATIENT
Start: 2024-04-24

## 2024-04-24 RX ORDER — DIVALPROEX SODIUM 500 MG/1
TABLET, DELAYED RELEASE ORAL
Qty: 21 TABLET | Refills: 0 | Status: SHIPPED | OUTPATIENT
Start: 2024-04-24 | End: 2024-04-24 | Stop reason: SDUPTHER

## 2024-04-24 RX ORDER — TRAZODONE HYDROCHLORIDE 300 MG/1
300 TABLET ORAL NIGHTLY
Qty: 90 TABLET | Refills: 0 | Status: CANCELLED | OUTPATIENT
Start: 2024-04-24

## 2024-04-24 RX ORDER — TRAZODONE HYDROCHLORIDE 300 MG/1
300 TABLET ORAL NIGHTLY
Qty: 90 TABLET | Refills: 1 | Status: SHIPPED | OUTPATIENT
Start: 2024-04-24

## 2024-04-24 RX ORDER — LANOLIN ALCOHOL/MO/W.PET/CERES
50 CREAM (GRAM) TOPICAL DAILY
COMMUNITY
Start: 2024-02-01 | End: 2024-07-30

## 2024-04-24 RX ORDER — AMOXICILLIN 875 MG/1
1 TABLET, COATED ORAL EVERY 12 HOURS SCHEDULED
COMMUNITY
Start: 2024-02-22 | End: 2024-04-24

## 2024-04-24 RX ORDER — OLANZAPINE 20 MG/1
20 TABLET ORAL NIGHTLY
Qty: 90 TABLET | Refills: 0 | Status: CANCELLED | OUTPATIENT
Start: 2024-04-24

## 2024-04-24 NOTE — PROGRESS NOTES
"[facility]  Follow-up Visit    Chief Complaint: \"He was in the hospital back in August... psychotic break... that's what we're calling it... transported to the hospital from [facility]... unresponsive in a cell... (low pulse, BP)... he was telling everybody he was [place] and he was God... seen someone at [facility] for tremors because of the medications... the medications he's on, he's a zombie, he can't do anything... recently had an MRI showing that his pituitary gland is thickening... virtual visit... [facility] and he had decreased the Risperdal... he made sexual gestures.\"     History of Present Illness: Alec Medrano is a 54 y.o. male who presents today for follow-up regarding schizoaffective disorder, ARIES, AUD in early remission in the context of ID. Last seen: 01/24 at which time no changes were made to his regimen. He presents accompanied by his sister in no acute distress and engages with me appropriately. Psychotropic regimen perceived to be effective. Side-effects per given history: lacrimation (tolerable).    Current treatment regimen includes:   - olanzapine 20 mg HS  -  mg QD + 1000 mg HS  - trazodone 300 mg HS  - benztropine 1 mg BID PRN tremors    Today the patient continues to do better than compared to the past. Schizoaffective symptoms are adequately managed with current interventions. Hallucinations, delusions have been less prominent. Anxiety symptoms are generally manageable with current interventions. Discussed elevated BP; they plan to discuss with PCP. I've advised he's at higher risk for CV complications that could be fatal. Discussed supplementation with melatonin for sleep at night, which remains sub-optimal. Thought process and content are devoid of overt aberration suggestive of acute sulema/psychosis. The patient denies SI/HI/AVH. There are no overt changes on exam today compared to most recent evaluation.  - contextual changes: " nephew (46 YO) committed suicide 01/24  - sleep: remains poor  - appetite: generally adequate, no change; +9 lb    I have counseled the patient with regard to diagnoses and the recommended treatment regimen as documented below. Patient acknowledges the diagnoses per my rendered interpretation. Patient demonstrates understanding of potential risks/benefits/side effects associated with this regimen and is amenable to proceed in this fashion.     Psychotherapy  - Time: 13 minutes  - interventions employed: the therapeutic alliance was strengthened to encourage the patient to express their thoughts and feelings freely. Esteem building was enhanced through praise, reassurance, normalizing/challenging, and encouragement as appropriate. Coping skills were enhanced to build distress tolerance skills and emotional regulation. Allowed patient to freely discuss issues without interruption or judgement with unconditional positive regard, active listening skills, and empathy. Provided a safe, confidential environment to facilitate the development of a positive therapeutic relationship and encourage open, honest communication. Assisted patient in processing session content; acknowledged and normalized/addressed, as appropriate, patient’s thoughts, feelings, and concerns by utilizing a person-centered approach in efforts to build appropriate rapport and a positive therapeutic relationship with open and honest communication.   - Diagnoses: see assessment and plan below  - Symptoms: see subjective above  - Goals   - patient: sustain improvements seen in reduced psychotic symptoms, sustain mood improvements, continue to bolster functional capacity   - provider: challenge patterns of living conducive to pathology, strengthen defenses, promote problems solving, restore adaptive functioning and provide symptom relief.  - Treatment plan: continue supportive psychotherapy in subsequent appointments to provide symptom relief; see  "assessment and plan below for additional details:   - iteration: 1   - progress: partial   - (X)illumination, (X)contextualization, (working)detection, (working)development, (-)elaboration, (-)refinement  - functional status: fair  - mental status exam: as below  - prognosis: fair    Psychiatric History:  Diagnoses: ID, sisters report other diagnoses haven't been rendered  Outpatient history: around 1 month ago with [facility]  Inpatient history: Located within [facility]  Medication trials: risperidone (tremors, drooling, overly sedated), duloxetine, trazodone  Other treatment modalities: has done therapy  Presenting regimen:  mg BID (recently tried to taper and began exhibiting sexual gestures)  Self harm: denies  Suicide attempts: denies     Substance Abuse History:   Types/methods/frequency: history of alcohol abuse/dependence     Social History:  Residence: lives with his sisters back and forth, requiring 24-hour monitoring  Vocation: denies  Education: HS diploma  Pertinent developmental history: had LD, was in special educational classes; denies abuse hx  Pertinent legal history: child support violation in the past, attempted escape, assault (after throwing urine on another inmate), PI, possession of marijuana  Hobbies/interests: defer  Temple: \"I believe in God\"  Exercise: walks  Dietary habits: defer  Sleep hygiene: defer  Social habits: no pertinent issues  Sunlight: no concern for under-exposure  Caffeine intake: 1 cup of coffee, occasional tea, soda (3 daily)  Hydration habits: doesn't drink enough water   history: denies    Social History     Socioeconomic History    Marital status: Unknown   Tobacco Use    Smoking status: Every Day     Current packs/day: 1.00     Average packs/day: 1.3 packs/day for 45.0 years (60.0 ttl pk-yrs)     Types: Cigarettes     Passive exposure: Past    Smokeless tobacco: Never   Vaping Use    Vaping status: Never Used   Substance and Sexual Activity    Alcohol use: Not " Currently    Drug use: Never    Sexual activity: Not Currently     Tobacco use counseling/intervention: patient has been counseled with regard to risks of tobacco use and encouraged to consider quitting, with or without the use of adjunctive medication, by first setting a prospective quit date. Currently contemplative by transtheoretical model.     PHQ-9 Depression Screening  PHQ-9 Total Score: 10    Little interest or pleasure in doing things? 0-->not at all   Feeling down, depressed, or hopeless? 1-->several days   Trouble falling or staying asleep, or sleeping too much? 3-->nearly every day (trouble falling asleep)   Feeling tired or having little energy? 3-->nearly every day   Poor appetite or overeating? 0-->not at all   Feeling bad about yourself - or that you are a failure or have let yourself or your family down? 0-->not at all   Trouble concentrating on things, such as reading the newspaper or watching television? 3-->nearly every day   Moving or speaking so slowly that other people could have noticed? Or the opposite - being so fidgety or restless that you have been moving around a lot more than usual? 0-->not at all   Thoughts that you would be better off dead, or of hurting yourself in some way? 0-->not at all   PHQ-9 Total Score 10     Change in PHQ-9 since last measure: -14 (24)    ARIES-7  Feeling nervous, anxious or on edge: Not at all  Not being able to stop or control worrying: Not at all  Worrying too much about different things: Not at all  Trouble Relaxing: Several days  Being so restless that it is hard to sit still: Not at all  Feeling afraid as if something awful might happen: Not at all  Becoming easily annoyed or irritable: Not at all  ARIES 7 Total Score: 1  If you checked any problems, how difficult have these problems made it for you to do your work, take care of things at home, or get along with other people: Extremely difficult    Change in ARIES-7 since last measure: -14 (15)    Problem  List:  Patient Active Problem List   Diagnosis    Schizoaffective disorder, bipolar type    Alcohol use disorder, moderate, in early remission, dependence    Intellectual disability    Insomnia, psychophysiological    ARIES (generalized anxiety disorder)     Allergy:   Allergies   Allergen Reactions    Haloperidol Anxiety, Mental Status Change and Irritability      Discontinued Medications:  Medications Discontinued During This Encounter   Medication Reason    amoxicillin (AMOXIL) 875 MG tablet *Therapy completed    divalproex (DEPAKOTE) 500 MG DR tablet Reorder    traZODone (DESYREL) 300 MG tablet Reorder    OLANZapine (zyPREXA) 20 MG tablet Reorder    divalproex (DEPAKOTE) 500 MG DR tablet Reorder     Current Medications:   Current Outpatient Medications   Medication Sig Dispense Refill    apixaban (Eliquis) 5 MG tablet tablet Take 1 tablet by mouth Every 12 (Twelve) Hours.      atorvastatin (LIPITOR) 40 MG tablet Take 1 tablet by mouth every night at bedtime.      benztropine (COGENTIN) 1 MG tablet TAKE 1 TABLET BY MOUTH 2 TIMES A DAY AS NEEDED FOR TREMORS OR DYSTONIA      Bevespi Aerosphere 9-4.8 MCG/ACT aerosol       Bevespi Aerosphere 9-4.8 MCG/ACT aerosol Inhale 2 sprays 2 (Two) Times a Day.      Cobalamin Combinations (B-12) 100-5000 MCG sublingual tablet Place  under the tongue.      dapagliflozin Propanediol (Farxiga) 10 MG tablet Take 1 tablet by mouth once daily 30 tablet 0    divalproex (DEPAKOTE) 500 MG DR tablet Take 1 tablet by mouth Daily AND 2 tablets Every Night. 270 tablet 1    famotidine (PEPCID) 20 MG tablet Take 1 tablet by mouth 2 (Two) Times a Day.      folic acid (FOLVITE) 1 MG tablet Take 1 tablet by mouth Daily.      glipizide (GLUCOTROL XL) 10 MG 24 hr tablet       metFORMIN (GLUCOPHAGE) 500 MG tablet Take 1 tablet by mouth 2 (Two) Times a Day With Meals.      metoprolol succinate XL (TOPROL-XL) 50 MG 24 hr tablet Take 1 tablet by mouth Daily.      metoprolol tartrate (LOPRESSOR) 50 MG  tablet Take 1 tablet by mouth 2 (Two) Times a Day.      OLANZapine (zyPREXA) 20 MG tablet Take 1 tablet by mouth Every Night. 90 tablet 1    thiamine (VITAMIN B1) 100 MG tablet       traZODone (DESYREL) 300 MG tablet Take 1 tablet by mouth Every Night. 90 tablet 1    vitamin B-12 (CYANOCOBALAMIN) 1000 MCG tablet Take 1 tablet by mouth Daily.      vitamin B-6 (pyridoxine) 50 MG tablet Take 1 tablet by mouth Daily.      cloNIDine (CATAPRES) 0.1 MG tablet Take 1 tablet by mouth 2 (Two) Times a Day As Needed (anxiety/irritability). (Patient not taking: Reported on 12/11/2023)       No current facility-administered medications for this visit.     Past Medical History:  Past Medical History:   Diagnosis Date    Alcohol abuse     Alcoholism     Anxiety     Bipolar disorder     COPD (chronic obstructive pulmonary disease)     Depression     Diabetes mellitus     Hyperlipidemia     Hypertension     Hypotension     Parkinson's disease     Pneumothorax     Psychiatric illness     Psychosis     Pulmonary emboli     Unresponsive episode      Past Surgical History:  Past Surgical History:   Procedure Laterality Date    ABDOMINAL SURGERY      SPLENECTOMY       Mental Status Exam:   Appearance: well-groomed, sits upright, age-appropriate, above average habitus  Behavior: calm, cooperative, appropriate eye-contact  Mood/affect: euthymic / mood-congruent, more responsive  Speech: within expected variance; appropriate rate, rhythm, tone; non-pressured  Thought Process: concrete, goal-directed; no FOI or BERTRAND  Thought Content: coherent, devoid of overt delusions/perceptual disturbances today, but +grandiose delusions reported in the past  SI/HI: denies both SI and HI; exhibits future-orientation, self-advocates appropriately, no regular self-harm, no appreciable intent  Memory: deficits apparent  Orientation: oriented to person  Concentration: appropriate  Intellectual capacity: below average  Insight: partial by given  "history/exam  Judgment: fair by given history/exam  Psychomotor: appropriate  Gait: appropriate    Review of Systems:  Review of Systems   Constitutional:  Negative for activity change, appetite change and unexpected weight change.   Gastrointestinal:  Negative for abdominal pain and nausea.   Neurological:  Negative for dizziness.   Psychiatric/Behavioral:  Positive for sleep disturbance. Negative for agitation and confusion.      Vital Signs:   BP (!) 174/101   Pulse 79   Ht 172.7 cm (68\")   Wt 96 kg (211 lb 9.6 oz)   BMI 32.17 kg/m²      Lab Results:   No visits with results within 6 Month(s) from this visit.   Latest known visit with results is:   No results displayed because visit has over 200 results.        EKG Results:  No orders to display     Imaging Results:  No Images in the past 120 days found.      ASSESSMENT AND PLAN:    ICD-10-CM ICD-9-CM   1. Schizoaffective disorder, bipolar type  F25.0 295.70   2. ARIES (generalized anxiety disorder)  F41.1 300.02   3. Insomnia, psychophysiological  F51.04 307.42   4. Alcohol use disorder, moderate, in early remission, dependence  F10.21 303.93   5. Intellectual disability  F79 319   6. Therapeutic drug monitoring  Z51.81 V58.83       54 y.o. male who presents today for follow-up regarding schizoaffective disorder, ARIES, AUD in early remission in the context of ID. We have discussed the interval history and the treatment plan below, including potential R/B/SE of the recommended regimen of which the patient demonstrates understanding. Patient is agreeable to call 911 or go to the nearest ER should he become concerned for his own safety and/or the safety of those around him. There are no overt indices of acute sulema/psychosis on exam today.    Medication regimen: no change - continue olanzapine, VPA, trazodone, benztropine; patient is advised not to misuse prescribed medications or to use them with any exogenous substances that aren't disclosed to this provider as " they may interact with the regimen to the patient's detriment.   Risk Assessment: protracted risk is high, imminent risk is low - some interval change. Do note that this is subject to change with the [employer]ism of new stressors, treatment non-adherence, use of substances, and/or new medical ails.   Monitoring: VPA level/CBC/CMP ordered  Therapy: deferred  Follow-up: 3 months  Communications: N/A    TREATMENT PLAN/GOALS: challenge patterns of living conducive to symptom burden, implement recommended regimen as above with augmentative, intermittent supportive psychotherapy to reduce symptom burden. Patient acknowledged and verbally consented to continue treatment. The importance of adherence to the recommended treatment and interval follow-up appointments was again emphasized today: patient has good treatment adherence per given history. Patient was today reminded to limit daily caffeine intake, hydrate appropriately, eat healthy and nutritious foods, engage sleep hygiene measures, engage appropriate exposure to sunlight, engage with hobbies in balance with life necessities, and exercise appropriate to their capacity to do so.     Billing: This encounter is of moderate complexity based on number/complexity of problems addressed today, amount/complexity of data reviewed today, risk of complications/morbidity: 2+ stable chronic illnesses and prescription management.     Parts of this note are electronic transcriptions/translations of spoken language to printed text using the Dragon Dictation system.    Electronically signed by Jay Jay Boss MD, 04/24/24, [id number]

## 2024-04-24 NOTE — TELEPHONE ENCOUNTER
traZODone (DESYREL) 300 MG tablet       Last ordered: 3 months ago (1/24/2024) by Jay Jay Boss MD   OLANZapine (zyPREXA) 20 MG tablet   Last ordered: 3 months ago (1/24/2024) by Jay Jay Boss MD    divalproex (DEPAKOTE) 500 MG DR tablet       Last ordered: 3 months ago (1/24/2024) by Jay Jay Boss MD     Follow up 04/24/2024

## 2024-06-10 ENCOUNTER — TELEPHONE (OUTPATIENT)
Dept: PSYCHIATRY | Facility: CLINIC | Age: 54
End: 2024-06-10
Payer: MEDICARE

## 2024-06-10 NOTE — TELEPHONE ENCOUNTER
If the issue is that he's not taking his medications, I don't think ordering new medications would be helpful unless he'd be more willing to take them. Is there any particular reason he's been unwilling to take his medications? Or certain medications he's okay to take and others he isn't?    Ultimately, if he's decompensating and needs acute treatment, he should be taken to the hospital for evaluation. He may need a period in the hospital for safety and stability. If they feel they can manage in the outpatient setting, I'm happy to see him urgently to discuss further.

## 2024-06-10 NOTE — TELEPHONE ENCOUNTER
Pt is having hallucinations that he is famous musicians.  Sister says that he is currently very non compliant and wants to know if his medications can be adjusted.    Sister says it is okay to leave a detailed message with return calls as she is currently at work

## 2024-06-11 NOTE — TELEPHONE ENCOUNTER
ATTEMPTED TO CONTACT PT(PATIENT) PER PROVIDER'S INSTRUCTIONS      NO ANSWER      LEFT VOICEMAIL WITH INSTRUCTIONS TO RETURN CALL TO OFFICE AT [phone]

## 2024-06-11 NOTE — TELEPHONE ENCOUNTER
This is understandable. I'm sorry it's been a struggle recently. Would they like to come in for a more urgent appointment?

## 2024-06-11 NOTE — TELEPHONE ENCOUNTER
PT(PATIENT) EC VERIFIED     CONTACTED PT(PATIENT) EC PER PROVIDER'S INSTRUCTIONS     PT(PATIENT) EC VERBALIZED UNDERSTANDING AND HAD NO FURTHER QUESTIONS AT THIS TIME

## 2024-06-11 NOTE — TELEPHONE ENCOUNTER
PT(PATIENT) EC VERIFIED     CONTACTED PT(PATIENT) EC PER PROVIDER'S INSTRUCTIONS     PT(PATIENT) EC STATES HE HAS ISSUES WITH AUTHORITY, AND SHE HAS TO CONSTANTLY ADVISED FOR HIM TO TAKE MEDICATIONS ONLY TO LATER FIND OUT THAT HE HASN'T TAKEN THEM     PT(PATIENT) EC STATES THIS IS NOT ONLY RELATED TO HIS MEDICATIONS, BUT ALSO OTHER THINGS (SUCH AS MEALS, ETC)     PT(PATIENT) EC STATES HE IS CURRENTLY NOT SLEEPING VERY WELL, AND HAS ASKED FOR MELATONIN (CURRENTLY TAKING 20MG OTC)     PT(PATIENT) EC STATES THE traZODone (DESYREL) 300 MG tablet (2024) DID SEEM TO HELP, BUT HE IS STILL STRUGGLING TO SLEEP     PT(PATIENT) EC STATES SHE WILL KEEP ER EVALUATION IN MIND, BUT FOR RIGHT NOW THINKS THEY CAN MANAGE

## 2024-06-13 ENCOUNTER — OFFICE VISIT (OUTPATIENT)
Dept: PSYCHIATRY | Facility: CLINIC | Age: 54
End: 2024-06-13
Payer: MEDICARE

## 2024-06-13 VITALS
HEART RATE: 90 BPM | BODY MASS INDEX: 32.92 KG/M2 | SYSTOLIC BLOOD PRESSURE: 218 MMHG | WEIGHT: 217.2 LBS | HEIGHT: 68 IN | DIASTOLIC BLOOD PRESSURE: 112 MMHG

## 2024-06-13 DIAGNOSIS — F79 INTELLECTUAL DISABILITY: ICD-10-CM

## 2024-06-13 DIAGNOSIS — F25.0 SCHIZOAFFECTIVE DISORDER, BIPOLAR TYPE: Primary | ICD-10-CM

## 2024-06-13 DIAGNOSIS — F43.21 GRIEF: ICD-10-CM

## 2024-06-13 DIAGNOSIS — F51.04 INSOMNIA, PSYCHOPHYSIOLOGICAL: ICD-10-CM

## 2024-06-13 DIAGNOSIS — F41.1 GAD (GENERALIZED ANXIETY DISORDER): ICD-10-CM

## 2024-06-13 RX ORDER — CLONAZEPAM 0.5 MG/1
0.5 TABLET ORAL 2 TIMES DAILY
Qty: 60 TABLET | Refills: 1 | Status: SHIPPED | OUTPATIENT
Start: 2024-06-13

## 2024-06-13 RX ORDER — ESCITALOPRAM OXALATE 10 MG/1
10 TABLET ORAL DAILY
Qty: 30 TABLET | Refills: 1 | Status: SHIPPED | OUTPATIENT
Start: 2024-06-13

## 2024-06-13 RX ORDER — DIVALPROEX SODIUM 500 MG/1
TABLET, DELAYED RELEASE ORAL
Qty: 450 TABLET | Refills: 0 | Status: SHIPPED | OUTPATIENT
Start: 2024-06-13

## 2024-06-13 NOTE — PROGRESS NOTES
"[facility]  Follow-up Visit    Chief Complaint: \"He was in the hospital back in August... psychotic break... that's what we're calling it... transported to the hospital from [facility]... unresponsive in a cell... (low pulse, BP)... he was telling everybody he was [place] and he was God... seen someone at [facility] for tremors because of the medications... the medications he's on, he's a zombie, he can't do anything... recently had an MRI showing that his pituitary gland is thickening... virtual visit... [facility] and he had decreased the Risperdal... he made sexual gestures.\"     History of Present Illness: Alec Medrano is a 54 y.o. male who presents today for follow-up regarding schizoaffective disorder, ARIES, AUD in early remission in the context of ID. Last seen:  at which time no changes were made to his regimen. He presents accompanied by his sister in no acute distress and engages with me appropriately. Psychotropic regimen perceived to be effective. Side-effects per given history: lacrimation (tolerable).    Current treatment regimen includes:   - olanzapine 20 mg HS  -  mg QD + 1000 mg HS  - trazodone 300 mg HS  - benztropine 1 mg BID PRN tremors    Today it's reported he's been more reluctant to take his medications, has been more argumentative and agitated, has been sleeping less and more expressive of delusions and hallucinations. Schizoaffective symptoms are adequately managed with current interventions. Anxiety symptoms are inadequately managed with current interventions; subjectively he is not feeling nervous or on edge, but this seems to be manifesting as agitation and irritability. I discussed with him his feelings today; he reports he's been feeling down since his nephew  by suicide. Discussed elevated BP in the context of reported symptoms and recommended urgent treatment. I've advised he's at higher risk for CV complications that " could be fatal. Thought process and content are devoid of overt aberration suggestive of acute sulema/psychosis. The patient denies SI/HI/AVH. There are no overt changes on exam today compared to most recent evaluation.  - contextual changes: denies, his sister is trying to get him involved in [employer]  - sleep: remains poor  - appetite: generally adequate, no change; +6 lb since last visit    I have counseled the patient with regard to diagnoses and the recommended treatment regimen as documented below: I will be prescribing clonazepam for ARIES and agitated behaviors. Patient has been advised that long-term use of this agent can foster tachyphylaxis, dependence, and severe/life-threatening withdrawal with abrupt discontinuation - this agent will be used sparingly and as a rescue during periods of severe anxiety to augment more regular treatment options. Patient has been advised that this agent can contribute to falls, confusion, sedation, and stunted psychological convalescence. Patient has been made aware of the significant diminishment to respiratory drive when this agent is used in combination with opioids. Patient has been made aware this medication should be considered for taper to discontinuation in the event of pregnancy and that breastfeeding should not occur while using this agent. I've advised to refrain from using this medication prior to driving or operating machinery. I have specifically reviewed issues related to misuse and diversion with the patient today. I will be prescribing escitalopram for depressed mood and ARIES. Patient has been advised that SRIs may take up to 6-8 weeks for full effect and have a possibility of exacerbating mood/anxiety issues for which they are prescribed to the degree that, in some cases, their use may lead to acute suicidality. Patient contracts for safety appropriately. More common SE - GI upset, tremors - were also reviewed. Patient acknowledges the diagnoses per my  rendered interpretation. Patient demonstrates understanding of potential risks/benefits/side effects associated with this regimen and is amenable to proceed in this fashion.     Psychotherapy  - Time: 22 minutes  - interventions employed: the therapeutic alliance was strengthened to encourage the patient to express their thoughts and feelings freely. Esteem building was enhanced through praise, reassurance, normalizing/challenging, and encouragement as appropriate. Coping skills were enhanced to build distress tolerance skills and emotional regulation. Allowed patient to freely discuss issues without interruption or judgement with unconditional positive regard, active listening skills, and empathy. Provided a safe, confidential environment to facilitate the development of a positive therapeutic relationship and encourage open, honest communication. Assisted patient in processing session content; acknowledged and normalized/addressed, as appropriate, patient’s thoughts, feelings, and concerns by utilizing a person-centered approach in efforts to build appropriate rapport and a positive therapeutic relationship with open and honest communication.   - Diagnoses: see assessment and plan below  - Symptoms: see subjective above  - Goals   - patient: sustain improvements seen in reduced psychotic symptoms, sustain mood improvements, continue to bolster functional capacity   - provider: challenge patterns of living conducive to pathology, strengthen defenses, promote problems solving, restore adaptive functioning and provide symptom relief.  - Treatment plan: continue supportive psychotherapy in subsequent appointments to provide symptom relief; see assessment and plan below for additional details:   - iteration: 1   - progress: partial   - (X)illumination, (X)contextualization, (working)detection, (working)development, (-)elaboration, (-)refinement  - functional status: fair  - mental status exam: as below  - prognosis:  "fair    Psychiatric History:  Diagnoses: ID, sisters report other diagnoses haven't been rendered  Outpatient history: around 1 month ago with [facility]  Inpatient history: [facility]  Medication trials: risperidone (tremors, drooling, overly sedated), duloxetine, trazodone  Other treatment modalities: has done therapy  Presenting regimen:  mg BID (recently tried to taper and began exhibiting sexual gestures)  Self harm: denies  Suicide attempts: denies     Substance Abuse History:   Types/methods/frequency: history of alcohol abuse/dependence     Social History:  Residence: lives with his sisters back and forth, requiring 24-hour monitoring  Vocation: denies  Education: HS diploma  Pertinent developmental history: had LD, was in special educational classes; denies abuse hx  Pertinent legal history: child support violation in the past, attempted escape, assault (after throwing urine on another inmate), PI, possession of marijuana  Hobbies/interests: defer  Orthodox: \"I believe in God\"  Exercise: walks  Dietary habits: defer  Sleep hygiene: defer  Social habits: no pertinent issues  Sunlight: no concern for under-exposure  Caffeine intake: 1 cup of coffee, occasional tea, soda (3 daily)  Hydration habits: doesn't drink enough water   history: denies    Social History     Socioeconomic History    Marital status: Unknown   Tobacco Use    Smoking status: Every Day     Current packs/day: 1.00     Average packs/day: 1.3 packs/day for 45.0 years (60.0 ttl pk-yrs)     Types: Cigarettes     Passive exposure: Past    Smokeless tobacco: Never   Vaping Use    Vaping status: Never Used   Substance and Sexual Activity    Alcohol use: Not Currently    Drug use: Never    Sexual activity: Not Currently     Tobacco use counseling/intervention: patient has been counseled with regard to risks of tobacco use and encouraged to consider quitting, with or without the use of adjunctive medication, by first setting a " prospective quit date. Currently contemplative by transtheoretical model.     PHQ-9 Depression Screening  PHQ-9 Total Score:      Little interest or pleasure in doing things?     Feeling down, depressed, or hopeless?     Trouble falling or staying asleep, or sleeping too much?     Feeling tired or having little energy?     Poor appetite or overeating?     Feeling bad about yourself - or that you are a failure or have let yourself or your family down?     Trouble concentrating on things, such as reading the newspaper or watching television?     Moving or speaking so slowly that other people could have noticed? Or the opposite - being so fidgety or restless that you have been moving around a lot more than usual?     Thoughts that you would be better off dead, or of hurting yourself in some way?     PHQ-9 Total Score       Change in PHQ-9 since last measure: N/A (10)    ARIES-7       Change in ARIES-7 since last measure: N/A (1)    Problem List:  Patient Active Problem List   Diagnosis    Schizoaffective disorder, bipolar type    Alcohol use disorder, moderate, in early remission, dependence    Intellectual disability    Insomnia, psychophysiological    ARIES (generalized anxiety disorder)     Allergy:   Allergies   Allergen Reactions    Haloperidol Anxiety, Mental Status Change and Irritability      Discontinued Medications:  Medications Discontinued During This Encounter   Medication Reason    Cobalamin Combinations (B-12) 100-5000 MCG sublingual tablet     Bevespi Aerosphere 9-4.8 MCG/ACT aerosol     metoprolol tartrate (LOPRESSOR) 50 MG tablet     cloNIDine (CATAPRES) 0.1 MG tablet     divalproex (DEPAKOTE) 500 MG DR tablet Reorder       Current Medications:   Current Outpatient Medications   Medication Sig Dispense Refill    apixaban (Eliquis) 5 MG tablet tablet Take 1 tablet by mouth Every 12 (Twelve) Hours.      atorvastatin (LIPITOR) 40 MG tablet Take 1 tablet by mouth every night at bedtime.      benztropine  (COGENTIN) 1 MG tablet TAKE 1 TABLET BY MOUTH 2 TIMES A DAY AS NEEDED FOR TREMORS OR DYSTONIA      Bevespi Aerosphere 9-4.8 MCG/ACT aerosol Inhale 2 sprays 2 (Two) Times a Day.      dapagliflozin Propanediol (Farxiga) 10 MG tablet Take 1 tablet by mouth once daily 30 tablet 0    divalproex (DEPAKOTE) 500 MG DR tablet Take 1 tablet by mouth Daily AND 4 tablets Every Night. 450 tablet 0    famotidine (PEPCID) 20 MG tablet Take 1 tablet by mouth 2 (Two) Times a Day.      folic acid (FOLVITE) 1 MG tablet Take 1 tablet by mouth Daily.      glipizide (GLUCOTROL XL) 10 MG 24 hr tablet       metFORMIN (GLUCOPHAGE) 500 MG tablet Take 1 tablet by mouth 2 (Two) Times a Day With Meals.      metoprolol succinate XL (TOPROL-XL) 50 MG 24 hr tablet Take 1 tablet by mouth Daily.      OLANZapine (zyPREXA) 20 MG tablet Take 1 tablet by mouth Every Night. 90 tablet 1    thiamine (VITAMIN B1) 100 MG tablet       traZODone (DESYREL) 300 MG tablet Take 1 tablet by mouth Every Night. 90 tablet 1    vitamin B-12 (CYANOCOBALAMIN) 1000 MCG tablet Take 1 tablet by mouth Daily.      vitamin B-6 (pyridoxine) 50 MG tablet Take 1 tablet by mouth Daily.      clonazePAM (KlonoPIN) 0.5 MG tablet Take 1 tablet by mouth 2 (Two) Times a Day. 60 tablet 1    escitalopram (Lexapro) 10 MG tablet Take 1 tablet by mouth Daily. 30 tablet 1     No current facility-administered medications for this visit.     Past Medical History:  Past Medical History:   Diagnosis Date    Alcohol abuse     Alcoholism     Anxiety     Bipolar disorder     COPD (chronic obstructive pulmonary disease)     Depression     Diabetes mellitus     Hyperlipidemia     Hypertension     Hypotension     Parkinson's disease     Pneumothorax     Psychiatric illness     Psychosis     Pulmonary emboli     Substance abuse     Unresponsive episode      Past Surgical History:  Past Surgical History:   Procedure Laterality Date    ABDOMINAL SURGERY      SPLENECTOMY       Mental Status Exam:  "  Appearance: well-groomed, sits upright, age-appropriate, above average habitus  Behavior: calm, cooperative, appropriate eye-contact  Mood/affect: dysphoric / mood-congruent, more responsive  Speech: within expected variance; appropriate rate, rhythm, tone; non-pressured  Thought Process: concrete, goal-directed; no FOI or BERTRAND  Thought Content: coherent, devoid of overt delusions/perceptual disturbances today, but +grandiose delusions reported in the past  SI/HI: denies both SI and HI; exhibits future-orientation, self-advocates appropriately, no regular self-harm, no appreciable intent  Memory: deficits apparent  Orientation: oriented to person  Concentration: appropriate  Intellectual capacity: below average  Insight: partial by given history/exam  Judgment: fair by given history/exam  Psychomotor: appropriate  Gait: appropriate    Review of Systems:  Review of Systems   Constitutional:  Negative for activity change, appetite change and unexpected weight change.   Eyes:  Positive for visual disturbance.   Gastrointestinal:  Negative for abdominal pain and nausea.   Neurological:  Negative for dizziness.   Psychiatric/Behavioral:  Positive for agitation, hallucinations and sleep disturbance. Negative for confusion.      Vital Signs:   BP (!) 218/112   Pulse 90   Ht 172.7 cm (68\")   Wt 98.5 kg (217 lb 3.2 oz)   BMI 33.03 kg/m²      Lab Results:   Office Visit on 04/24/2024   Component Date Value Ref Range Status    Valproic Acid 04/24/2024 28.0 (L)  50.0 - 125.0 mcg/mL Final    Glucose 04/24/2024 136 (H)  65 - 99 mg/dL Final    BUN 04/24/2024 19  6 - 20 mg/dL Final    Creatinine 04/24/2024 1.17  0.76 - 1.27 mg/dL Final    Sodium 04/24/2024 142  136 - 145 mmol/L Final    Potassium 04/24/2024 5.1  3.5 - 5.2 mmol/L Final    Chloride 04/24/2024 105  98 - 107 mmol/L Final    CO2 04/24/2024 28.8  22.0 - 29.0 mmol/L Final    Calcium 04/24/2024 10.5  8.6 - 10.5 mg/dL Final    Total Protein 04/24/2024 7.3  6.0 - 8.5 " g/dL Final    Albumin 04/24/2024 4.3  3.5 - 5.2 g/dL Final    ALT (SGPT) 04/24/2024 27  1 - 41 U/L Final    AST (SGOT) 04/24/2024 26  1 - 40 U/L Final    Alkaline Phosphatase 04/24/2024 99  39 - 117 U/L Final    Total Bilirubin 04/24/2024 0.2  0.0 - 1.2 mg/dL Final    Globulin 04/24/2024 3.0  gm/dL Final    A/G Ratio 04/24/2024 1.4  g/dL Final    BUN/Creatinine Ratio 04/24/2024 16.2  7.0 - 25.0 Final    Anion Gap 04/24/2024 8.2  5.0 - 15.0 mmol/L Final    eGFR 04/24/2024 74.5  >60.0 mL/min/1.73 Final    WBC 04/24/2024 15.85 (H)  3.40 - 10.80 10*3/mm3 Final    RBC 04/24/2024 5.73  4.14 - 5.80 10*6/mm3 Final    Hemoglobin 04/24/2024 17.5  13.0 - 17.7 g/dL Final    Hematocrit 04/24/2024 52.8 (H)  37.5 - 51.0 % Final    MCV 04/24/2024 92.1  79.0 - 97.0 fL Final    MCH 04/24/2024 30.5  26.6 - 33.0 pg Final    MCHC 04/24/2024 33.1  31.5 - 35.7 g/dL Final    RDW 04/24/2024 13.8  12.3 - 15.4 % Final    RDW-SD 04/24/2024 46.3  37.0 - 54.0 fl Final    MPV 04/24/2024 10.7  6.0 - 12.0 fL Final    Platelets 04/24/2024 287  140 - 450 10*3/mm3 Final    Neutrophil % 04/24/2024 60.5  42.7 - 76.0 % Final    Lymphocyte % 04/24/2024 27.3  19.6 - 45.3 % Final    Monocyte % 04/24/2024 8.6  5.0 - 12.0 % Final    Eosinophil % 04/24/2024 2.6  0.3 - 6.2 % Final    Basophil % 04/24/2024 0.4  0.0 - 1.5 % Final    Immature Grans % 04/24/2024 0.6 (H)  0.0 - 0.5 % Final    Neutrophils, Absolute 04/24/2024 9.58 (H)  1.70 - 7.00 10*3/mm3 Final    Lymphocytes, Absolute 04/24/2024 4.33 (H)  0.70 - 3.10 10*3/mm3 Final    Monocytes, Absolute 04/24/2024 1.36 (H)  0.10 - 0.90 10*3/mm3 Final    Eosinophils, Absolute 04/24/2024 0.41 (H)  0.00 - 0.40 10*3/mm3 Final    Basophils, Absolute 04/24/2024 0.07  0.00 - 0.20 10*3/mm3 Final    Immature Grans, Absolute 04/24/2024 0.10 (H)  0.00 - 0.05 10*3/mm3 Final    nRBC 04/24/2024 0.0  0.0 - 0.2 /100 WBC Final     EKG Results:  No orders to display     Imaging Results:  No Images in the past 120 days found.       ASSESSMENT AND PLAN:    ICD-10-CM ICD-9-CM   1. Schizoaffective disorder, bipolar type  F25.0 295.70   2. ARIES (generalized anxiety disorder)  F41.1 300.02   3. Insomnia, psychophysiological  F51.04 307.42   4. Intellectual disability  F79 319   5. Grief  F43.21 309.0     54 y.o. male who presents today for follow-up regarding schizoaffective disorder, ARIES, AUD in early remission in the context of ID. We have discussed the interval history and the treatment plan below, including potential R/B/SE of the recommended regimen of which the patient demonstrates understanding. Patient is agreeable to call 911 or go to the nearest ER should he become concerned for his own safety and/or the safety of those around him. There are no overt indices of acute sulema/psychosis on exam today.    Medication regimen: titrate VPA to 500 mg QD + 2000 mg QHS, begin clonazepam 0.5 mg BID, begin escitalopram 10 mg QD - continue olanzapine, trazodone, benztropine; patient is advised not to misuse prescribed medications or to use them with any exogenous substances that aren't disclosed to this provider as they may interact with the regimen to the patient's detriment.   Risk Assessment: protracted risk is high, imminent risk is low - some interval change. Do note that this is subject to change with the Muslim of new stressors, treatment non-adherence, use of substances, and/or new medical ails.   Monitoring: VPA level/CBC/CMP ordered  Therapy: deferred  Follow-up: 1 month  Communications: N/A    TREATMENT PLAN/GOALS: challenge patterns of living conducive to symptom burden, implement recommended regimen as above with augmentative, intermittent supportive psychotherapy to reduce symptom burden. Patient acknowledged and verbally consented to continue treatment. The importance of adherence to the recommended treatment and interval follow-up appointments was again emphasized today: patient has good treatment adherence per given history. Patient  was today reminded to limit daily caffeine intake, hydrate appropriately, eat healthy and nutritious foods, engage sleep hygiene measures, engage appropriate exposure to sunlight, engage with hobbies in balance with life necessities, and exercise appropriate to their capacity to do so.     Billing: This encounter is of moderate complexity based on number/complexity of problems addressed today, amount/complexity of data reviewed today, risk of complications/morbidity: 2+ stable chronic illnesses and prescription management. Additionally, I provided 22 minutes of dedicated psychotherapy to the patient, distinct from E/M services, as documented above. Start time: 1353. Stop time: 1415.     Parts of this note are electronic transcriptions/translations of spoken language to printed text using the Dragon Dictation system.    Electronically signed by Jay Jay Boss MD, 06/13/24, 0924

## 2024-07-02 ENCOUNTER — OFFICE VISIT (OUTPATIENT)
Dept: PSYCHIATRY | Facility: CLINIC | Age: 54
End: 2024-07-02
Payer: MEDICARE

## 2024-07-02 VITALS
HEIGHT: 68 IN | WEIGHT: 198.4 LBS | SYSTOLIC BLOOD PRESSURE: 118 MMHG | BODY MASS INDEX: 30.07 KG/M2 | DIASTOLIC BLOOD PRESSURE: 75 MMHG | HEART RATE: 71 BPM

## 2024-07-02 DIAGNOSIS — F25.0 SCHIZOAFFECTIVE DISORDER, BIPOLAR TYPE: Primary | ICD-10-CM

## 2024-07-02 DIAGNOSIS — F51.04 INSOMNIA, PSYCHOPHYSIOLOGICAL: ICD-10-CM

## 2024-07-02 DIAGNOSIS — F41.1 GAD (GENERALIZED ANXIETY DISORDER): ICD-10-CM

## 2024-07-02 DIAGNOSIS — Z51.81 THERAPEUTIC DRUG MONITORING: ICD-10-CM

## 2024-07-02 PROCEDURE — 1159F MED LIST DOCD IN RCRD: CPT | Performed by: PSYCHIATRY & NEUROLOGY

## 2024-07-02 PROCEDURE — 1160F RVW MEDS BY RX/DR IN RCRD: CPT | Performed by: PSYCHIATRY & NEUROLOGY

## 2024-07-02 PROCEDURE — 99214 OFFICE O/P EST MOD 30 MIN: CPT | Performed by: PSYCHIATRY & NEUROLOGY

## 2024-07-02 RX ORDER — LEVOFLOXACIN 750 MG/1
1 TABLET, FILM COATED ORAL DAILY
COMMUNITY
Start: 2024-06-19

## 2024-07-02 RX ORDER — ALBUTEROL SULFATE 90 UG/1
2 AEROSOL, METERED RESPIRATORY (INHALATION)
COMMUNITY
Start: 2024-07-02 | End: 2025-07-02

## 2024-07-02 RX ORDER — ESCITALOPRAM OXALATE 10 MG/1
10 TABLET ORAL NIGHTLY
Start: 2024-07-02

## 2024-07-02 RX ORDER — DIVALPROEX SODIUM 250 MG/1
250 TABLET, DELAYED RELEASE ORAL NIGHTLY
Qty: 90 TABLET | Refills: 0 | Status: SHIPPED | OUTPATIENT
Start: 2024-07-02

## 2024-07-02 RX ORDER — HYDROCHLOROTHIAZIDE 25 MG/1
25 TABLET ORAL DAILY
COMMUNITY
Start: 2024-06-18 | End: 2025-06-18

## 2024-07-02 RX ORDER — DIVALPROEX SODIUM 500 MG/1
TABLET, DELAYED RELEASE ORAL
Start: 2024-07-02

## 2024-07-02 NOTE — PROGRESS NOTES
"[facility]  Follow-up Visit    Chief Complaint: \"He was in the hospital back in August... psychotic break... that's what we're calling it... transported to the hospital from [facility]... unresponsive in a cell... (low pulse, BP)... he was telling everybody he was [place] and he was God... seen someone at [facility] for tremors because of the medications... the medications he's on, he's a zombie, he can't do anything... recently had an MRI showing that his pituitary gland is thickening... virtual visit... [facility] and he had decreased the Risperdal... he made sexual gestures.\"     History of Present Illness: Alec Medrano is a 54 y.o. male who presents today for follow-up regarding schizoaffective disorder, ARIES, AUD in early remission in the context of ID. Last seen: 06/13 at which time VPA was titrated, clonazepam and escitalopram were started. He presents accompanied by his sister in no acute distress and engages with me appropriately. Psychotropic regimen perceived to be effective. Side-effects per given history: sedation.    Current treatment regimen includes:   - olanzapine 20 mg HS  -  mg QD + 1500 mg HS (has tapered from last Rx)  - trazodone 300 mg HS  - benztropine 1 mg BID PRN tremors  - clonazepam 0.5 mg BID (hasn't been taking this, planned on PRN usage)  - escitalopram 10 mg QD    Today it's reported he's been very sedated since his last visit; this is confounded with a recent bout of pneumonia for which he visited the hospital. He had some head imaging performed, generally unremarkable compared to prior exams - reportedly has upcoming MRI of the brain to compare. Schizoaffective symptoms are adequately managed with current interventions, but medications appear to be overly potent in effect currently. Anxiety symptoms are adequately managed with current interventions. Thought process and content are devoid of overt aberration suggestive of acute " sulema/psychosis. The patient denies SI/HI/AVH. There are no overt changes on exam today compared to most recent evaluation.  - contextual changes: as above  - sleep: excessive  - appetite: diminished; -19 lb since last visit    I have counseled the patient with regard to diagnoses and the recommended treatment regimen as documented below. Patient acknowledges the diagnoses per my rendered interpretation. Patient demonstrates understanding of potential risks/benefits/side effects associated with this regimen and is amenable to proceed in this fashion.     Psychotherapy  - Time: 13 minutes  - interventions employed: the therapeutic alliance was strengthened to encourage the patient to express their thoughts and feelings freely. Esteem building was enhanced through praise, reassurance, normalizing/challenging, and encouragement as appropriate. Coping skills were enhanced to build distress tolerance skills and emotional regulation. Allowed patient to freely discuss issues without interruption or judgement with unconditional positive regard, active listening skills, and empathy. Provided a safe, confidential environment to facilitate the development of a positive therapeutic relationship and encourage open, honest communication. Assisted patient in processing session content; acknowledged and normalized/addressed, as appropriate, patient’s thoughts, feelings, and concerns by utilizing a person-centered approach in efforts to build appropriate rapport and a positive therapeutic relationship with open and honest communication.   - Diagnoses: see assessment and plan below  - Symptoms: see subjective above  - Goals   - patient: sustain improvements seen in reduced psychotic symptoms, sustain mood improvements, continue to bolster functional capacity   - provider: challenge patterns of living conducive to pathology, strengthen defenses, promote problems solving, restore adaptive functioning and provide symptom relief.  -  "Treatment plan: continue supportive psychotherapy in subsequent appointments to provide symptom relief; see assessment and plan below for additional details:   - iteration: 1   - progress: partial   - (X)illumination, (X)contextualization, (working)detection, (working)development, (-)elaboration, (-)refinement  - functional status: fair  - mental status exam: as below  - prognosis: fair    Psychiatric History:  Diagnoses: ID, sisters report other diagnoses haven't been rendered  Outpatient history: around 1 month ago with [facility]  Inpatient history: [facility]  Medication trials: risperidone (tremors, drooling, overly sedated), duloxetine, trazodone  Other treatment modalities: has done therapy  Presenting regimen:  mg BID (recently tried to taper and began exhibiting sexual gestures)  Self harm: denies  Suicide attempts: denies     Substance Abuse History:   Types/methods/frequency: history of alcohol abuse/dependence     Social History:  Residence: lives with his sisters back and forth, requiring 24-hour monitoring  Vocation: denies  Education: HS diploma  Pertinent developmental history: had LD, was in special educational classes; denies abuse hx  Pertinent legal history: child support violation in the past, attempted escape, assault (after throwing urine on another inmate), PI, possession of marijuana  Hobbies/interests: defer  Jehovah's witness: \"I believe in God\"  Exercise: walks  Dietary habits: defer  Sleep hygiene: defer  Social habits: no pertinent issues  Sunlight: no concern for under-exposure  Caffeine intake: 1 cup of coffee, occasional tea, soda (3 daily)  Hydration habits: doesn't drink enough water   history: denies    Social History     Socioeconomic History    Marital status: Unknown   Tobacco Use    Smoking status: Every Day     Current packs/day: 1.00     Average packs/day: 1.3 packs/day for 45.0 years (60.0 ttl pk-yrs)     Types: Cigarettes     Passive exposure: Past    Smokeless " tobacco: Never   Vaping Use    Vaping status: Never Used   Substance and Sexual Activity    Alcohol use: Not Currently    Drug use: Never    Sexual activity: Not Currently     Tobacco use counseling/intervention: patient has been counseled with regard to risks of tobacco use and encouraged to consider quitting, with or without the use of adjunctive medication, by first setting a prospective quit date. Currently contemplative by transtheoretical model.     PHQ-9 Depression Screening  PHQ-9 Total Score:      Little interest or pleasure in doing things?     Feeling down, depressed, or hopeless?     Trouble falling or staying asleep, or sleeping too much?     Feeling tired or having little energy?     Poor appetite or overeating?     Feeling bad about yourself - or that you are a failure or have let yourself or your family down?     Trouble concentrating on things, such as reading the newspaper or watching television?     Moving or speaking so slowly that other people could have noticed? Or the opposite - being so fidgety or restless that you have been moving around a lot more than usual?     Thoughts that you would be better off dead, or of hurting yourself in some way?     PHQ-9 Total Score       Change in PHQ-9 since last measure: N/A (10)    ARIES-7       Change in ARIES-7 since last measure: N/A (1)    Problem List:  Patient Active Problem List   Diagnosis    Schizoaffective disorder, bipolar type    Alcohol use disorder, moderate, in early remission, dependence    Intellectual disability    Insomnia, psychophysiological    ARIES (generalized anxiety disorder)     Allergy:   Allergies   Allergen Reactions    Haloperidol Anxiety, Mental Status Change and Irritability      Discontinued Medications:  Medications Discontinued During This Encounter   Medication Reason    divalproex (DEPAKOTE) 500 MG DR tablet     escitalopram (Lexapro) 10 MG tablet      Current Medications:   Current Outpatient Medications   Medication Sig  Dispense Refill    albuterol sulfate  (90 Base) MCG/ACT inhaler Inhale 2 puffs.      apixaban (Eliquis) 5 MG tablet tablet Take 1 tablet by mouth Every 12 (Twelve) Hours.      atorvastatin (LIPITOR) 40 MG tablet Take 1 tablet by mouth every night at bedtime.      benztropine (COGENTIN) 1 MG tablet TAKE 1 TABLET BY MOUTH 2 TIMES A DAY AS NEEDED FOR TREMORS OR DYSTONIA      clonazePAM (KlonoPIN) 0.5 MG tablet Take 1 tablet by mouth 2 (Two) Times a Day. 60 tablet 1    dapagliflozin Propanediol (Farxiga) 10 MG tablet Take 1 tablet by mouth once daily 30 tablet 0    divalproex (DEPAKOTE) 500 MG DR tablet Take 1 tablet by mouth Daily AND 2 tablets Every Night.      escitalopram (Lexapro) 10 MG tablet Take 1 tablet by mouth Every Night.      famotidine (PEPCID) 20 MG tablet Take 1 tablet by mouth 2 (Two) Times a Day.      folic acid (FOLVITE) 1 MG tablet Take 1 tablet by mouth Daily.      glipizide (GLUCOTROL XL) 10 MG 24 hr tablet       hydroCHLOROthiazide 25 MG tablet Take 1 tablet by mouth Daily.      levoFLOXacin (LEVAQUIN) 750 MG tablet Take 1 tablet by mouth Daily.      metFORMIN (GLUCOPHAGE) 500 MG tablet Take 1 tablet by mouth 2 (Two) Times a Day With Meals.      metoprolol succinate XL (TOPROL-XL) 50 MG 24 hr tablet Take 1 tablet by mouth Daily.      OLANZapine (zyPREXA) 20 MG tablet Take 1 tablet by mouth Every Night. 90 tablet 1    thiamine (VITAMIN B1) 100 MG tablet       traZODone (DESYREL) 300 MG tablet Take 1 tablet by mouth Every Night. 90 tablet 1    vitamin B-12 (CYANOCOBALAMIN) 1000 MCG tablet Take 1 tablet by mouth Daily.      vitamin B-6 (pyridoxine) 50 MG tablet Take 1 tablet by mouth Daily.      Bevespi Aerosphere 9-4.8 MCG/ACT aerosol Inhale 2 sprays 2 (Two) Times a Day.      divalproex (DEPAKOTE) 250 MG DR tablet Take 1 tablet by mouth Every Night. Take along with 1000 mg dose at night for total nightly dose of 1250 mg and a total daily dose of 1750 mg. 90 tablet 0     No current  "facility-administered medications for this visit.     Past Medical History:  Past Medical History:   Diagnosis Date    Alcohol abuse     Alcoholism     Anxiety     Bipolar disorder     COPD (chronic obstructive pulmonary disease)     Depression     Diabetes mellitus     Hyperlipidemia     Hypertension     Hypotension     Parkinson's disease     Pneumothorax     Psychiatric illness     Psychosis     Pulmonary emboli     Substance abuse     Unresponsive episode      Past Surgical History:  Past Surgical History:   Procedure Laterality Date    ABDOMINAL SURGERY      SPLENECTOMY       Mental Status Exam:   Appearance: well-groomed, sits upright, age-appropriate, above average habitus  Behavior: sedated, cooperative, limited eye-contact  Mood/affect: dysphoric / mood-congruent, blunted  Speech: offers minimally; within expected variance; appropriate rate, rhythm, tone; non-pressured  Thought Process: concrete, goal-directed; no FOI or BERTRAND  Thought Content: coherent, devoid of overt delusions/perceptual disturbances today, but +grandiose delusions reported in the past  SI/HI: denies both SI and HI; exhibits future-orientation, self-advocates appropriately, no regular self-harm, no appreciable intent  Memory: deficits apparent  Orientation: oriented to person  Concentration: diminished (sedation)  Intellectual capacity: below average  Insight: partial by given history/exam  Judgment: fair by given history/exam  Psychomotor: +slight slowing  Gait: appropriate    Review of Systems:  Review of Systems   Constitutional:  Positive for activity change, appetite change, fatigue and unexpected weight change.   Eyes:  Negative for visual disturbance.   Gastrointestinal:  Negative for abdominal pain and nausea.   Neurological:  Negative for dizziness.   Psychiatric/Behavioral:  Positive for hallucinations. Negative for agitation, confusion and sleep disturbance.      Vital Signs:   /75   Pulse 71   Ht 172.7 cm (68\")   Wt 90 " kg (198 lb 6.4 oz)   BMI 30.17 kg/m²      Lab Results:   Office Visit on 04/24/2024   Component Date Value Ref Range Status    Valproic Acid 04/24/2024 28.0 (L)  50.0 - 125.0 mcg/mL Final    Glucose 04/24/2024 136 (H)  65 - 99 mg/dL Final    BUN 04/24/2024 19  6 - 20 mg/dL Final    Creatinine 04/24/2024 1.17  0.76 - 1.27 mg/dL Final    Sodium 04/24/2024 142  136 - 145 mmol/L Final    Potassium 04/24/2024 5.1  3.5 - 5.2 mmol/L Final    Chloride 04/24/2024 105  98 - 107 mmol/L Final    CO2 04/24/2024 28.8  22.0 - 29.0 mmol/L Final    Calcium 04/24/2024 10.5  8.6 - 10.5 mg/dL Final    Total Protein 04/24/2024 7.3  6.0 - 8.5 g/dL Final    Albumin 04/24/2024 4.3  3.5 - 5.2 g/dL Final    ALT (SGPT) 04/24/2024 27  1 - 41 U/L Final    AST (SGOT) 04/24/2024 26  1 - 40 U/L Final    Alkaline Phosphatase 04/24/2024 99  39 - 117 U/L Final    Total Bilirubin 04/24/2024 0.2  0.0 - 1.2 mg/dL Final    Globulin 04/24/2024 3.0  gm/dL Final    A/G Ratio 04/24/2024 1.4  g/dL Final    BUN/Creatinine Ratio 04/24/2024 16.2  7.0 - 25.0 Final    Anion Gap 04/24/2024 8.2  5.0 - 15.0 mmol/L Final    eGFR 04/24/2024 74.5  >60.0 mL/min/1.73 Final    WBC 04/24/2024 15.85 (H)  3.40 - 10.80 10*3/mm3 Final    RBC 04/24/2024 5.73  4.14 - 5.80 10*6/mm3 Final    Hemoglobin 04/24/2024 17.5  13.0 - 17.7 g/dL Final    Hematocrit 04/24/2024 52.8 (H)  37.5 - 51.0 % Final    MCV 04/24/2024 92.1  79.0 - 97.0 fL Final    MCH 04/24/2024 30.5  26.6 - 33.0 pg Final    MCHC 04/24/2024 33.1  31.5 - 35.7 g/dL Final    RDW 04/24/2024 13.8  12.3 - 15.4 % Final    RDW-SD 04/24/2024 46.3  37.0 - 54.0 fl Final    MPV 04/24/2024 10.7  6.0 - 12.0 fL Final    Platelets 04/24/2024 287  140 - 450 10*3/mm3 Final    Neutrophil % 04/24/2024 60.5  42.7 - 76.0 % Final    Lymphocyte % 04/24/2024 27.3  19.6 - 45.3 % Final    Monocyte % 04/24/2024 8.6  5.0 - 12.0 % Final    Eosinophil % 04/24/2024 2.6  0.3 - 6.2 % Final    Basophil % 04/24/2024 0.4  0.0 - 1.5 % Final    Immature  Grans % 04/24/2024 0.6 (H)  0.0 - 0.5 % Final    Neutrophils, Absolute 04/24/2024 9.58 (H)  1.70 - 7.00 10*3/mm3 Final    Lymphocytes, Absolute 04/24/2024 4.33 (H)  0.70 - 3.10 10*3/mm3 Final    Monocytes, Absolute 04/24/2024 1.36 (H)  0.10 - 0.90 10*3/mm3 Final    Eosinophils, Absolute 04/24/2024 0.41 (H)  0.00 - 0.40 10*3/mm3 Final    Basophils, Absolute 04/24/2024 0.07  0.00 - 0.20 10*3/mm3 Final    Immature Grans, Absolute 04/24/2024 0.10 (H)  0.00 - 0.05 10*3/mm3 Final    nRBC 04/24/2024 0.0  0.0 - 0.2 /100 WBC Final     EKG Results:  No orders to display     Imaging Results:  No Images in the past 120 days found.    ASSESSMENT AND PLAN:    ICD-10-CM ICD-9-CM   1. Schizoaffective disorder, bipolar type  F25.0 295.70   2. ARIES (generalized anxiety disorder)  F41.1 300.02   3. Insomnia, psychophysiological  F51.04 307.42   4. Therapeutic drug monitoring  Z51.81 V58.83     54 y.o. male who presents today for follow-up regarding schizoaffective disorder, ARIES, AUD in early remission in the context of ID. We have discussed the interval history and the treatment plan below, including potential R/B/SE of the recommended regimen of which the patient demonstrates understanding. Patient is agreeable to call 911 or go to the nearest ER should he become concerned for his own safety and/or the safety of those around him. There are no overt indices of acute sulema/psychosis on exam today.    Medication regimen: taper VPA to 500 mg QD + 1250 mg HS, move escitalopram to HS dosing - continue olanzapine, clonazepam (PRN), trazodone, benztropine; patient is advised not to misuse prescribed medications or to use them with any exogenous substances that aren't disclosed to this provider as they may interact with the regimen to the patient's detriment.   Risk Assessment: protracted risk is high, imminent risk is low - some interval change. Do note that this is subject to change with the Tenriism of new stressors, treatment non-adherence,  use of substances, and/or new medical ails.   Monitoring: VPA level ordered; outside head CT reviewed  Therapy: deferred  Follow-up: 1 month  Communications: N/A    TREATMENT PLAN/GOALS: challenge patterns of living conducive to symptom burden, implement recommended regimen as above with augmentative, intermittent supportive psychotherapy to reduce symptom burden. Patient acknowledged and verbally consented to continue treatment. The importance of adherence to the recommended treatment and interval follow-up appointments was again emphasized today: patient has good treatment adherence per given history. Patient was today reminded to limit daily caffeine intake, hydrate appropriately, eat healthy and nutritious foods, engage sleep hygiene measures, engage appropriate exposure to sunlight, engage with hobbies in balance with life necessities, and exercise appropriate to their capacity to do so.     Billing: This encounter is of moderate complexity based on number/complexity of problems addressed today, amount/complexity of data reviewed today, risk of complications/morbidity: 2+ stable chronic illnesses and prescription management.    Parts of this note are electronic transcriptions/translations of spoken language to printed text using the Dragon Dictation system.    Electronically signed by Jay Jay Boss MD, 07/02/24, [id number]

## 2024-08-07 ENCOUNTER — TELEPHONE (OUTPATIENT)
Dept: PSYCHIATRY | Facility: CLINIC | Age: 54
End: 2024-08-07
Payer: MEDICARE

## 2024-08-07 NOTE — TELEPHONE ENCOUNTER
PT(PATIENT) EC VERIFIED      CONTACTED PT(PATIENT) EC VIA OVER DUE LAB ORDERS PROTOCOL     Valproic Acid Level, Total (2024 15:28)     PT(PATIENT) EC ADVISED TO COMPLETE ORDER VIA OUTPATIENT LAB SERVICES AT EITHER     HOSPITAL OUTPATIENT LAB   OPEN M-F 6AM - 6PM, SAT 6AM-12PM  [address] [facility]   PHONE #[phone]   EXT 1612    [facility] OUTPATIENT LAB   OPEN M-F 630AM - 5PM   CLOSED SATURDAY AND    [address] [place] [address]  PHONE #[phone]     PT(PATIENT) EC VERBALIZED UNDERSTANDING AND HAD NO FURTHER QUESTIONS AT THIS TIME

## 2024-08-08 ENCOUNTER — LAB (OUTPATIENT)
Dept: LAB | Facility: HOSPITAL | Age: 54
End: 2024-08-08
Payer: MEDICARE

## 2024-08-08 DIAGNOSIS — F25.0 SCHIZOAFFECTIVE DISORDER, BIPOLAR TYPE: ICD-10-CM

## 2024-08-08 LAB — VALPROATE SERPL-MCNC: 47 MCG/ML (ref 50–125)

## 2024-08-08 PROCEDURE — 36415 COLL VENOUS BLD VENIPUNCTURE: CPT

## 2024-08-08 PROCEDURE — 80164 ASSAY DIPROPYLACETIC ACD TOT: CPT

## 2024-08-13 ENCOUNTER — OFFICE VISIT (OUTPATIENT)
Dept: PSYCHIATRY | Facility: CLINIC | Age: 54
End: 2024-08-13
Payer: MEDICARE

## 2024-08-13 VITALS
BODY MASS INDEX: 28.61 KG/M2 | DIASTOLIC BLOOD PRESSURE: 81 MMHG | SYSTOLIC BLOOD PRESSURE: 123 MMHG | WEIGHT: 188.8 LBS | HEIGHT: 68 IN | HEART RATE: 72 BPM

## 2024-08-13 DIAGNOSIS — F51.04 INSOMNIA, PSYCHOPHYSIOLOGICAL: ICD-10-CM

## 2024-08-13 DIAGNOSIS — F79 INTELLECTUAL DISABILITY: ICD-10-CM

## 2024-08-13 DIAGNOSIS — F25.0 SCHIZOAFFECTIVE DISORDER, BIPOLAR TYPE: Primary | ICD-10-CM

## 2024-08-13 DIAGNOSIS — F41.1 GAD (GENERALIZED ANXIETY DISORDER): ICD-10-CM

## 2024-08-13 PROCEDURE — 99214 OFFICE O/P EST MOD 30 MIN: CPT | Performed by: PSYCHIATRY & NEUROLOGY

## 2024-08-13 PROCEDURE — 1159F MED LIST DOCD IN RCRD: CPT | Performed by: PSYCHIATRY & NEUROLOGY

## 2024-08-13 PROCEDURE — 1160F RVW MEDS BY RX/DR IN RCRD: CPT | Performed by: PSYCHIATRY & NEUROLOGY

## 2024-08-13 RX ORDER — SULFAMETHOXAZOLE AND TRIMETHOPRIM 800; 160 MG/1; MG/1
1 TABLET ORAL EVERY 12 HOURS SCHEDULED
COMMUNITY
Start: 2024-07-15

## 2024-08-13 RX ORDER — GLUCOSAM/CHON-MSM1/C/MANG/BOSW 500-416.6
TABLET ORAL
COMMUNITY
Start: 2024-07-09

## 2024-08-13 RX ORDER — CALCIUM CITRATE/VITAMIN D3 200MG-6.25
1 TABLET ORAL EVERY 24 HOURS
COMMUNITY
Start: 2024-07-08

## 2024-08-13 RX ORDER — ISOPROPYL ALCOHOL 70 ML/100ML
SWAB TOPICAL
COMMUNITY
Start: 2024-07-09

## 2024-08-13 RX ORDER — DIPHENHYDRAMINE HCL 25 MG
TABLET ORAL
COMMUNITY
Start: 2024-07-09

## 2024-08-13 NOTE — PROGRESS NOTES
"[facility]  Follow-up Visit    Chief Complaint: \"He was in the hospital back in August... psychotic break... that's what we're calling it... transported to the hospital from [facility]... unresponsive in a cell... (low pulse, BP)... he was telling everybody he was [place] and he was God... seen someone at [facility] for tremors because of the medications... the medications he's on, he's a zombie, he can't do anything... recently had an MRI showing that his pituitary gland is thickening... virtual visit... [facility] and he had decreased the Risperdal... he made sexual gestures.\"     History of Present Illness: Alec Medrano is a 54 y.o. male who presents today for follow-up regarding schizoaffective disorder, ARIES, AUD in early remission in the context of ID. Last seen: 07/02 at which time VPA was tapered. He presents accompanied by his sister in no acute distress and engages with me appropriately. Psychotropic regimen perceived to be effective. Side-effects per given history: sedation.    Current treatment regimen includes:   - olanzapine 20 mg HS  -  mg QD + 1250 mg HS  - trazodone 300 mg HS  - benztropine 1 mg BID PRN tremors  - clonazepam 0.5 mg BID (hasn't been taking this, planned on PRN usage)  - escitalopram 10 mg HS    Today Alec is reported to be doing generally better with medication. He is mildly defiant at times, but generally easily redirected and adherent to household rules otherwise. Schizoaffective symptoms are adequately managed with current interventions. Anxiety symptoms are adequately managed with current interventions. Thought process and content are devoid of overt aberration suggestive of acute sulema/psychosis. The patient denies SI/HI/AVH. There are no overt changes on exam today compared to most recent evaluation.  - contextual changes: denies  - sleep: generally adequate  - appetite: diminished; -10 lb since last visit (intentional)    I " have counseled the patient with regard to diagnoses and the recommended treatment regimen as documented below. Patient acknowledges the diagnoses per my rendered interpretation. Patient demonstrates understanding of potential risks/benefits/side effects associated with this regimen and is amenable to proceed in this fashion.     Psychotherapy  - Time: N/A  - interventions employed: the therapeutic alliance was strengthened to encourage the patient to express their thoughts and feelings freely. Esteem building was enhanced through praise, reassurance, normalizing/challenging, and encouragement as appropriate. Coping skills were enhanced to build distress tolerance skills and emotional regulation. Allowed patient to freely discuss issues without interruption or judgement with unconditional positive regard, active listening skills, and empathy. Provided a safe, confidential environment to facilitate the development of a positive therapeutic relationship and encourage open, honest communication. Assisted patient in processing session content; acknowledged and normalized/addressed, as appropriate, patient’s thoughts, feelings, and concerns by utilizing a person-centered approach in efforts to build appropriate rapport and a positive therapeutic relationship with open and honest communication.   - Diagnoses: see assessment and plan below  - Symptoms: see subjective above  - Goals   - patient: sustain improvements seen in reduced psychotic symptoms, sustain mood improvements, continue to bolster functional capacity   - provider: challenge patterns of living conducive to pathology, strengthen defenses, promote problems solving, restore adaptive functioning and provide symptom relief.  - Treatment plan: continue supportive psychotherapy in subsequent appointments to provide symptom relief; see assessment and plan below for additional details:   - iteration: 1   - progress: at goal   - (X)illumination, (X)contextualization,  "(X)detection, (X)development, (X)elaboration, (X)refinement  - functional status: fair  - mental status exam: as below  - prognosis: fair    Psychiatric History:  Diagnoses: ID, sisters report other diagnoses haven't been rendered  Outpatient history: around 1 month ago with [facility]  Inpatient history: [facility]  Medication trials: risperidone (tremors, drooling, overly sedated), duloxetine, trazodone  Other treatment modalities: has done therapy  Presenting regimen:  mg BID (recently tried to taper and began exhibiting sexual gestures)  Self harm: denies  Suicide attempts: denies     Substance Abuse History:   Types/methods/frequency: history of alcohol abuse/dependence     Social History:  Residence: lives with his sisters back and forth, requiring 24-hour monitoring  Vocation: denies  Education: HS diploma  Pertinent developmental history: had LD, was in special educational classes; denies abuse hx  Pertinent legal history: child support violation in the past, attempted escape, assault (after throwing urine on another inmate), PI, possession of marijuana  Hobbies/interests: defer  [place]: \"I believe in God\"  Exercise: walks  Dietary habits: defer  Sleep hygiene: defer  Social habits: no pertinent issues  Sunlight: no concern for under-exposure  Caffeine intake: 1 cup of coffee, occasional tea, soda (3 daily)  Hydration habits: doesn't drink enough water   history: denies    Social History     Socioeconomic History    Marital status: Unknown   Tobacco Use    Smoking status: Every Day     Current packs/day: 1.00     Average packs/day: 1.3 packs/day for 45.0 years (60.0 ttl pk-yrs)     Types: Cigarettes     Passive exposure: Past    Smokeless tobacco: Never   Vaping Use    Vaping status: Never Used   Substance and Sexual Activity    Alcohol use: Not Currently    Drug use: Never    Sexual activity: Not Currently     Tobacco use counseling/intervention: patient has been counseled with regard to " risks of tobacco use and encouraged to consider quitting, with or without the use of adjunctive medication, by first setting a prospective quit date. Currently contemplative by transtheoretical model.     PHQ-9 Depression Screening  PHQ-9 Total Score:      Little interest or pleasure in doing things?     Feeling down, depressed, or hopeless?     Trouble falling or staying asleep, or sleeping too much?     Feeling tired or having little energy?     Poor appetite or overeating?     Feeling bad about yourself - or that you are a failure or have let yourself or your family down?     Trouble concentrating on things, such as reading the newspaper or watching television?     Moving or speaking so slowly that other people could have noticed? Or the opposite - being so fidgety or restless that you have been moving around a lot more than usual?     Thoughts that you would be better off dead, or of hurting yourself in some way?     PHQ-9 Total Score       Change in PHQ-9 since last measure: N/A (10)    ARIES-7       Change in ARIES-7 since last measure: N/A (1)    Problem List:  Patient Active Problem List   Diagnosis    Schizoaffective disorder, bipolar type    Alcohol use disorder, moderate, in early remission, dependence    Intellectual disability    Insomnia, psychophysiological    ARIES (generalized anxiety disorder)     Allergy:   Allergies   Allergen Reactions    Haloperidol Anxiety, Mental Status Change and Irritability      Discontinued Medications:  There are no discontinued medications.    Current Medications:   Current Outpatient Medications   Medication Sig Dispense Refill    albuterol sulfate  (90 Base) MCG/ACT inhaler Inhale 2 puffs.      Alcohol Swabs (DropSafe Alcohol Prep) 70 % pads       apixaban (Eliquis) 5 MG tablet tablet Take 1 tablet by mouth Every 12 (Twelve) Hours.      atorvastatin (LIPITOR) 40 MG tablet Take 1 tablet by mouth every night at bedtime.      benztropine (COGENTIN) 1 MG tablet TAKE 1  TABLET BY MOUTH 2 TIMES A DAY AS NEEDED FOR TREMORS OR DYSTONIA      Bevespi Aerosphere 9-4.8 MCG/ACT aerosol Inhale 2 sprays 2 (Two) Times a Day.      Blood Glucose Monitoring Suppl (True Metrix Air Glucose Meter) w/Device kit       clonazePAM (KlonoPIN) 0.5 MG tablet Take 1 tablet by mouth 2 (Two) Times a Day. 60 tablet 1    dapagliflozin Propanediol (Farxiga) 10 MG tablet Take 1 tablet by mouth once daily 30 tablet 0    divalproex (DEPAKOTE) 250 MG DR tablet Take 1 tablet by mouth Every Night. Take along with 1000 mg dose at night for total nightly dose of 1250 mg and a total daily dose of 1750 mg. 90 tablet 0    divalproex (DEPAKOTE) 500 MG DR tablet Take 1 tablet by mouth Daily AND 2 tablets Every Night.      escitalopram (Lexapro) 10 MG tablet Take 1 tablet by mouth Every Night.      famotidine (PEPCID) 20 MG tablet Take 1 tablet by mouth 2 (Two) Times a Day.      folic acid (FOLVITE) 1 MG tablet Take 1 tablet by mouth Daily.      glipizide (GLUCOTROL XL) 10 MG 24 hr tablet       hydroCHLOROthiazide 25 MG tablet Take 1 tablet by mouth Daily.      levoFLOXacin (LEVAQUIN) 750 MG tablet Take 1 tablet by mouth Daily.      metFORMIN (GLUCOPHAGE) 500 MG tablet Take 1 tablet by mouth 2 (Two) Times a Day With Meals.      metoprolol succinate XL (TOPROL-XL) 50 MG 24 hr tablet Take 1 tablet by mouth Daily.      OLANZapine (zyPREXA) 20 MG tablet Take 1 tablet by mouth Every Night. 90 tablet 1    sulfamethoxazole-trimethoprim (BACTRIM DS,SEPTRA DS) 800-160 MG per tablet Take 1 tablet by mouth Every 12 (Twelve) Hours.      thiamine (VITAMIN B1) 100 MG tablet       traZODone (DESYREL) 300 MG tablet Take 1 tablet by mouth Every Night. 90 tablet 1    True Metrix Blood Glucose Test test strip 1 each by Other route Daily.      TRUEplus Lancets 33G misc       vitamin B-12 (CYANOCOBALAMIN) 1000 MCG tablet Take 1 tablet by mouth Daily.       No current facility-administered medications for this visit.     Past Medical  "History:  Past Medical History:   Diagnosis Date    Alcohol abuse     Alcoholism     Anxiety     Bipolar disorder     COPD (chronic obstructive pulmonary disease)     Depression     Diabetes mellitus     Hyperlipidemia     Hypertension     Hypotension     Parkinson's disease     Pneumothorax     Psychiatric illness     Psychosis     Pulmonary emboli     Substance abuse     Unresponsive episode      Past Surgical History:  Past Surgical History:   Procedure Laterality Date    ABDOMINAL SURGERY      SPLENECTOMY       Mental Status Exam:   Appearance: well-groomed, sits upright, age-appropriate, above average habitus  Behavior: sedated, cooperative, limited eye-contact  Mood/affect: euthymic / mood-congruent, blunted  Speech: within expected variance; appropriate rate, rhythm, tone; non-pressured  Thought Process: concrete, goal-directed; no FOI or BERTRAND  Thought Content: coherent, denies AVH, +grandiose delusions (believes he is [place])  SI/HI: denies both SI and HI; exhibits future-orientation, self-advocates appropriately, no regular self-harm, no appreciable intent  Memory: deficits apparent  Orientation: oriented to person  Concentration: appropriate during evaluation  Intellectual capacity: below average  Insight: partial by given history/exam  Judgment: fair by given history/exam  Psychomotor: appropriate  Gait: slowed    Review of Systems:  Review of Systems   Constitutional:  Positive for unexpected weight change. Negative for activity change and appetite change.   Gastrointestinal:  Negative for abdominal pain and nausea.   Psychiatric/Behavioral:  Negative for agitation and sleep disturbance.      Vital Signs:   /81   Pulse 72   Ht 172.7 cm (68\")   Wt 85.6 kg (188 lb 12.8 oz)   BMI 28.71 kg/m²      Lab Results:   Lab on 08/08/2024   Component Date Value Ref Range Status    Valproic Acid 08/08/2024 47.0 (L)  50.0 - 125.0 mcg/mL Final   Office Visit on 04/24/2024   Component Date Value Ref Range " Status    Valproic Acid 04/24/2024 28.0 (L)  50.0 - 125.0 mcg/mL Final    Glucose 04/24/2024 136 (H)  65 - 99 mg/dL Final    BUN 04/24/2024 19  6 - 20 mg/dL Final    Creatinine 04/24/2024 1.17  0.76 - 1.27 mg/dL Final    Sodium 04/24/2024 142  136 - 145 mmol/L Final    Potassium 04/24/2024 5.1  3.5 - 5.2 mmol/L Final    Chloride 04/24/2024 105  98 - 107 mmol/L Final    CO2 04/24/2024 28.8  22.0 - 29.0 mmol/L Final    Calcium 04/24/2024 10.5  8.6 - 10.5 mg/dL Final    Total Protein 04/24/2024 7.3  6.0 - 8.5 g/dL Final    Albumin 04/24/2024 4.3  3.5 - 5.2 g/dL Final    ALT (SGPT) 04/24/2024 27  1 - 41 U/L Final    AST (SGOT) 04/24/2024 26  1 - 40 U/L Final    Alkaline Phosphatase 04/24/2024 99  39 - 117 U/L Final    Total Bilirubin 04/24/2024 0.2  0.0 - 1.2 mg/dL Final    Globulin 04/24/2024 3.0  gm/dL Final    A/G Ratio 04/24/2024 1.4  g/dL Final    BUN/Creatinine Ratio 04/24/2024 16.2  7.0 - 25.0 Final    Anion Gap 04/24/2024 8.2  5.0 - 15.0 mmol/L Final    eGFR 04/24/2024 74.5  >60.0 mL/min/1.73 Final    WBC 04/24/2024 15.85 (H)  3.40 - 10.80 10*3/mm3 Final    RBC 04/24/2024 5.73  4.14 - 5.80 10*6/mm3 Final    Hemoglobin 04/24/2024 17.5  13.0 - 17.7 g/dL Final    Hematocrit 04/24/2024 52.8 (H)  37.5 - 51.0 % Final    MCV 04/24/2024 92.1  79.0 - 97.0 fL Final    MCH 04/24/2024 30.5  26.6 - 33.0 pg Final    MCHC 04/24/2024 33.1  31.5 - 35.7 g/dL Final    RDW 04/24/2024 13.8  12.3 - 15.4 % Final    RDW-SD 04/24/2024 46.3  37.0 - 54.0 fl Final    MPV 04/24/2024 10.7  6.0 - 12.0 fL Final    Platelets 04/24/2024 287  140 - 450 10*3/mm3 Final    Neutrophil % 04/24/2024 60.5  42.7 - 76.0 % Final    Lymphocyte % 04/24/2024 27.3  19.6 - 45.3 % Final    Monocyte % 04/24/2024 8.6  5.0 - 12.0 % Final    Eosinophil % 04/24/2024 2.6  0.3 - 6.2 % Final    Basophil % 04/24/2024 0.4  0.0 - 1.5 % Final    Immature Grans % 04/24/2024 0.6 (H)  0.0 - 0.5 % Final    Neutrophils, Absolute 04/24/2024 9.58 (H)  1.70 - 7.00 10*3/mm3 Final     Lymphocytes, Absolute 04/24/2024 4.33 (H)  0.70 - 3.10 10*3/mm3 Final    Monocytes, Absolute 04/24/2024 1.36 (H)  0.10 - 0.90 10*3/mm3 Final    Eosinophils, Absolute 04/24/2024 0.41 (H)  0.00 - 0.40 10*3/mm3 Final    Basophils, Absolute 04/24/2024 0.07  0.00 - 0.20 10*3/mm3 Final    Immature Grans, Absolute 04/24/2024 0.10 (H)  0.00 - 0.05 10*3/mm3 Final    nRBC 04/24/2024 0.0  0.0 - 0.2 /100 WBC Final     EKG Results:  No orders to display     Imaging Results:  No Images in the past 120 days found.    ASSESSMENT AND PLAN:    ICD-10-CM ICD-9-CM   1. Schizoaffective disorder, bipolar type  F25.0 295.70   2. ARIES (generalized anxiety disorder)  F41.1 300.02   3. Intellectual disability  F79 319   4. Insomnia, psychophysiological  F51.04 307.42     54 y.o. male who presents today for follow-up regarding schizoaffective disorder, ARIES, AUD in early remission in the context of ID. We have discussed the interval history and the treatment plan below, including potential R/B/SE of the recommended regimen of which the patient demonstrates understanding. Patient is agreeable to call 911 or go to the nearest ER should he become concerned for his own safety and/or the safety of those around him. There are no overt indices of acute sulema/psychosis on exam today.    Medication regimen: no change - continue VPA, escitalopram, olanzapine, clonazepam (PRN), trazodone, benztropine; patient is advised not to misuse prescribed medications or to use them with any exogenous substances that aren't disclosed to this provider as they may interact with the regimen to the patient's detriment.   Risk assessment: protracted risk is high, imminent risk is low - some interval change. Do note that this is subject to change with the Denominational of new stressors, treatment non-adherence, use of substances, and/or new medical ails.   Monitoring: VPA level 47 (08/08/2024)  Therapy: deferred  Follow-up: 2 months  Communications: N/A  Treatment plan: due,  defer (at goal)    TREATMENT PLAN/GOALS: challenge patterns of living conducive to symptom burden, implement recommended regimen as above with augmentative, intermittent supportive psychotherapy to reduce symptom burden. Patient acknowledged and verbally consented to continue treatment. The importance of adherence to the recommended treatment and interval follow-up appointments was again emphasized today: patient has good treatment adherence per given history. Patient was today reminded to limit daily caffeine intake, hydrate appropriately, eat healthy and nutritious foods, engage sleep hygiene measures, engage appropriate exposure to sunlight, engage with hobbies in balance with life necessities, and exercise appropriate to their capacity to do so.     Billing: This encounter is of moderate complexity based on number/complexity of problems addressed today, amount/complexity of data reviewed today, risk of complications/morbidity: 2+ stable chronic illnesses and prescription management.    Parts of this note are electronic transcriptions/translations of spoken language to printed text using the Dragon Dictation system.    Electronically signed by Jay Jay Boss MD, 08/13/24, [id number]

## 2024-08-21 DIAGNOSIS — F41.1 GAD (GENERALIZED ANXIETY DISORDER): ICD-10-CM

## 2024-08-21 DIAGNOSIS — F25.0 SCHIZOAFFECTIVE DISORDER, BIPOLAR TYPE: ICD-10-CM

## 2024-08-21 RX ORDER — ESCITALOPRAM OXALATE 10 MG/1
10 TABLET ORAL DAILY
Qty: 30 TABLET | Refills: 1 | Status: SHIPPED | OUTPATIENT
Start: 2024-08-21

## 2024-08-21 NOTE — TELEPHONE ENCOUNTER
NEXT VISIT WITH PROVIDER   Appointment with Jay Jay Boss MD (10/17/2024)     LAST SEEN BY PROVIDER   Office Visit with Jay Jay Boss MD (08/13/2024)     LAST MED REFILL  escitalopram (Lexapro) 10 MG tablet (07/02/2024)     PROVIDER PLEASE ADVISE

## 2024-09-14 DIAGNOSIS — F25.0 SCHIZOAFFECTIVE DISORDER, BIPOLAR TYPE: ICD-10-CM

## 2024-09-14 DIAGNOSIS — F51.04 INSOMNIA, PSYCHOPHYSIOLOGICAL: ICD-10-CM

## 2024-09-16 RX ORDER — OLANZAPINE 20 MG/1
20 TABLET ORAL NIGHTLY
Qty: 90 TABLET | Refills: 3 | Status: SHIPPED | OUTPATIENT
Start: 2024-09-16

## 2024-09-16 RX ORDER — DIVALPROEX SODIUM 250 MG/1
TABLET, DELAYED RELEASE ORAL
Qty: 90 TABLET | Refills: 3 | Status: SHIPPED | OUTPATIENT
Start: 2024-09-16

## 2024-09-16 RX ORDER — TRAZODONE HYDROCHLORIDE 300 MG/1
300 TABLET ORAL NIGHTLY
Qty: 90 TABLET | Refills: 3 | Status: SHIPPED | OUTPATIENT
Start: 2024-09-16

## 2024-10-14 DIAGNOSIS — F41.1 GAD (GENERALIZED ANXIETY DISORDER): ICD-10-CM

## 2024-10-14 DIAGNOSIS — F25.0 SCHIZOAFFECTIVE DISORDER, BIPOLAR TYPE: ICD-10-CM

## 2024-10-14 RX ORDER — ESCITALOPRAM OXALATE 10 MG/1
10 TABLET ORAL DAILY
Qty: 30 TABLET | Refills: 0 | Status: SHIPPED | OUTPATIENT
Start: 2024-10-14 | End: 2024-10-17 | Stop reason: SDUPTHER

## 2024-10-14 NOTE — TELEPHONE ENCOUNTER
NEXT VISIT WITH PROVIDER   Appointment with Jay Jay Boss MD (10/17/2024)     LAST SEEN BY PROVIDER   Office Visit with Jay Jay Boss MD (08/13/2024)     LAST MED REFILL  escitalopram (LEXAPRO) 10 MG tablet (08/21/2024)     PROVIDER PLEASE ADVISE

## 2024-10-17 ENCOUNTER — OFFICE VISIT (OUTPATIENT)
Dept: PSYCHIATRY | Facility: CLINIC | Age: 54
End: 2024-10-17
Payer: MEDICARE

## 2024-10-17 VITALS
OXYGEN SATURATION: 95 % | SYSTOLIC BLOOD PRESSURE: 141 MMHG | WEIGHT: 185 LBS | HEART RATE: 76 BPM | BODY MASS INDEX: 28.04 KG/M2 | HEIGHT: 68 IN | DIASTOLIC BLOOD PRESSURE: 89 MMHG

## 2024-10-17 DIAGNOSIS — F41.1 GAD (GENERALIZED ANXIETY DISORDER): ICD-10-CM

## 2024-10-17 DIAGNOSIS — F79 INTELLECTUAL DISABILITY: ICD-10-CM

## 2024-10-17 DIAGNOSIS — F25.0 SCHIZOAFFECTIVE DISORDER, BIPOLAR TYPE: Primary | ICD-10-CM

## 2024-10-17 DIAGNOSIS — F51.04 INSOMNIA, PSYCHOPHYSIOLOGICAL: ICD-10-CM

## 2024-10-17 RX ORDER — METOPROLOL SUCCINATE 100 MG/1
100 TABLET, EXTENDED RELEASE ORAL DAILY
COMMUNITY
Start: 2024-08-25

## 2024-10-17 RX ORDER — DIVALPROEX SODIUM 500 MG/1
TABLET, DELAYED RELEASE ORAL
Qty: 270 TABLET | Refills: 0
Start: 2024-10-17

## 2024-10-17 RX ORDER — ESCITALOPRAM OXALATE 10 MG/1
10 TABLET ORAL DAILY
Qty: 90 TABLET | Refills: 0 | Status: SHIPPED | OUTPATIENT
Start: 2024-10-17

## 2024-10-17 NOTE — PROGRESS NOTES
"[facility]  Follow-up Visit    Chief Complaint: \"He was in the hospital back in August... psychotic break... that's what we're calling it... transported to the hospital from [facility]... unresponsive in a cell... (low pulse, BP)... he was telling everybody he was [place] and he was God... seen someone at [facility] for tremors because of the medications... the medications he's on, he's a zombie, he can't do anything... recently had an MRI showing that his pituitary gland is thickening... virtual visit... [facility] and he had decreased the Risperdal... he made sexual gestures.\"     History of Present Illness: Alec Medrano is a 54 y.o. male who presents today for follow-up regarding schizoaffective disorder, ARIES, AUD in early remission in the context of ID. Last seen: 08/13 at which time no changes were made to his regimen. He presents accompanied by his sister in no acute distress and engages with me appropriately. Psychotropic regimen perceived to be effective. Side-effects per given history: sedation and dulling to his personality.    Current treatment regimen includes:   - olanzapine 20 mg HS  -  mg QD + 1250 mg HS  - trazodone 300 mg HS  - benztropine 1 mg BID PRN tremors  - clonazepam 0.5 mg BID (hasn't been taking this, planned on PRN usage)  - escitalopram 10 mg HS    Today Alec is reported to be doing generally well, though he did have more issues when he had a UTI that have since resolved. He does have days when he's particularly fatigued and seems to be in a fog. He has other times when he seems more himself, more lucid. I've advised prospectively that he will likely continue to have waxing-waning symptoms as he's demonstrated. I've also advised with regard to cumulative damage to the nervous system seen over time with repeated episodes of psychosis. He more frequently exhibits child-like, attention-seeking behaviors. His sister is helping him with " continued regimen adherence. She feels that behaviors are manageable with his current regimen, but does express sadness regarding his general decline over time and that his medications are often sedating and dulling to his personality. Still, she feels ultimately that benefits outweigh risks. Schizoaffective symptoms are adequately managed with current interventions. Anxiety symptoms are adequately managed with current interventions. Thought process and content are devoid of overt aberration suggestive of acute sulema/psychosis. The patient denies SI/HI/AVH. There are no overt changes on exam today compared to most recent evaluation.  - contextual changes: denies  - sleep: generally adequate  - appetite: no change, eats intermittently through the day with better portion controls; -3 lb since last visit (-13 lb over last two visits)    I have counseled the patient with regard to diagnoses and the recommended treatment regimen as documented below. Patient acknowledges the diagnoses per my rendered interpretation. Patient demonstrates understanding of potential risks/benefits/side effects associated with this regimen and is amenable to proceed in this fashion.     Psychotherapy  - Time: N/A  - interventions employed: the therapeutic alliance was strengthened to encourage the patient to express their thoughts and feelings freely. Esteem building was enhanced through praise, reassurance, normalizing/challenging, and encouragement as appropriate. Coping skills were enhanced to build distress tolerance skills and emotional regulation. Allowed patient to freely discuss issues without interruption or judgement with unconditional positive regard, active listening skills, and empathy. Provided a safe, confidential environment to facilitate the development of a positive therapeutic relationship and encourage open, honest communication. Assisted patient in processing session content; acknowledged and normalized/addressed, as  appropriate, patient’s thoughts, feelings, and concerns by utilizing a person-centered approach in efforts to build appropriate rapport and a positive therapeutic relationship with open and honest communication.   - Diagnoses: see assessment and plan below  - Symptoms: see subjective above  - Goals   - patient: sustain improvements seen in reduced psychotic symptoms, sustain mood improvements, continue to bolster functional capacity   - provider: challenge patterns of living conducive to pathology, strengthen defenses, promote problems solving, restore adaptive functioning and provide symptom relief.  - Treatment plan: continue supportive psychotherapy in subsequent appointments to provide symptom relief; see assessment and plan below for additional details:   - iteration: 1   - progress: at goal   - (X)illumination, (X)contextualization, (X)detection, (X)development, (X)elaboration, (X)refinement  - functional status: fair  - mental status exam: as below  - prognosis: fair    Psychiatric History:  Diagnoses: ID, sisters report other diagnoses haven't been rendered  Outpatient history: around 1 month ago with [facility]  Inpatient history: [facility]  Medication trials: risperidone (tremors, drooling, overly sedated), duloxetine, trazodone  Other treatment modalities: has done therapy  Presenting regimen:  mg BID (recently tried to taper and began exhibiting sexual gestures)  Self harm: denies  Suicide attempts: denies     Substance Abuse History:   Types/methods/frequency: history of alcohol abuse/dependence     Social History:  Residence: lives with his sisters back and forth, requiring 24-hour monitoring  Vocation: denies  Education: HS diploma  Pertinent developmental history: had LD, was in special educational classes; denies abuse hx  Pertinent legal history: child support violation in the past, attempted escape, assault (after throwing urine on another inmate), PI, possession of  "marijuana  Hobbies/interests: defer  Mandaen: \"I believe in God\"  Exercise: walks  Dietary habits: defer  Sleep hygiene: defer  Social habits: no pertinent issues  Sunlight: no concern for under-exposure  Caffeine intake: 1 cup of coffee, occasional tea, soda (3 daily)  Hydration habits: doesn't drink enough water   history: denies    Social History     Socioeconomic History    Marital status: Unknown   Tobacco Use    Smoking status: Every Day     Current packs/day: 1.00     Average packs/day: 1.3 packs/day for 45.0 years (60.0 ttl pk-yrs)     Types: Cigarettes     Passive exposure: Past    Smokeless tobacco: Never   Vaping Use    Vaping status: Never Used   Substance and Sexual Activity    Alcohol use: Not Currently    Drug use: Never    Sexual activity: Not Currently     Tobacco use counseling/intervention: patient has been counseled with regard to risks of tobacco use and encouraged to consider quitting, with or without the use of adjunctive medication, by first setting a prospective quit date. Currently contemplative by transtheoretical model.     PHQ-9 Depression Screening  PHQ-9 Total Score:      Little interest or pleasure in doing things?     Feeling down, depressed, or hopeless?     Trouble falling or staying asleep, or sleeping too much?     Feeling tired or having little energy?     Poor appetite or overeating?     Feeling bad about yourself - or that you are a failure or have let yourself or your family down?     Trouble concentrating on things, such as reading the newspaper or watching television?     Moving or speaking so slowly that other people could have noticed? Or the opposite - being so fidgety or restless that you have been moving around a lot more than usual?     Thoughts that you would be better off dead, or of hurting yourself in some way?     PHQ-9 Total Score       Change in PHQ-9 since last measure: N/A (10)    ARIES-7       Change in ARIES-7 since last measure: N/A (1)    Problem " List:  Patient Active Problem List   Diagnosis    Schizoaffective disorder, bipolar type    Alcohol use disorder, moderate, in early remission, dependence    Intellectual disability    Insomnia, psychophysiological    ARIES (generalized anxiety disorder)     Allergy:   Allergies   Allergen Reactions    Haloperidol Anxiety, Mental Status Change and Irritability      Discontinued Medications:  Medications Discontinued During This Encounter   Medication Reason    metoprolol succinate XL (TOPROL-XL) 50 MG 24 hr tablet *Therapy completed    divalproex (DEPAKOTE) 500 MG DR tablet Reorder    escitalopram (LEXAPRO) 10 MG tablet Reorder     Current Medications:   Current Outpatient Medications   Medication Sig Dispense Refill    albuterol sulfate  (90 Base) MCG/ACT inhaler Inhale 2 puffs.      Alcohol Swabs (DropSafe Alcohol Prep) 70 % pads       apixaban (Eliquis) 5 MG tablet tablet Take 1 tablet by mouth Every 12 (Twelve) Hours.      atorvastatin (LIPITOR) 40 MG tablet Take 1 tablet by mouth every night at bedtime.      benztropine (COGENTIN) 1 MG tablet TAKE 1 TABLET BY MOUTH 2 TIMES A DAY AS NEEDED FOR TREMORS OR DYSTONIA      Bevespi Aerosphere 9-4.8 MCG/ACT aerosol Inhale 2 sprays 2 (Two) Times a Day.      Blood Glucose Monitoring Suppl (True Metrix Air Glucose Meter) w/Device kit       clonazePAM (KlonoPIN) 0.5 MG tablet Take 1 tablet by mouth 2 (Two) Times a Day. 60 tablet 1    divalproex (DEPAKOTE) 250 MG DR tablet TAKE 1 TABLET EVERY NIGHT ALONG WITH 1000 MG DOSE FOR TOTAL NIGHTLY DOSE OF 1250 MG AND A TOTAL DAILY DOSE OF 1750 MG. 90 tablet 3    divalproex (DEPAKOTE) 500 MG DR tablet Take 1 tablet by mouth Daily AND 2 tablets Every Night. 270 tablet 0    escitalopram (LEXAPRO) 10 MG tablet Take 1 tablet by mouth Daily. 90 tablet 0    famotidine (PEPCID) 20 MG tablet Take 1 tablet by mouth 2 (Two) Times a Day.      folic acid (FOLVITE) 1 MG tablet Take 1 tablet by mouth Daily.      glipizide (GLUCOTROL XL) 10  MG 24 hr tablet       hydroCHLOROthiazide 25 MG tablet Take 1 tablet by mouth Daily.      levoFLOXacin (LEVAQUIN) 750 MG tablet Take 1 tablet by mouth Daily.      metFORMIN (GLUCOPHAGE) 500 MG tablet Take 1 tablet by mouth 2 (Two) Times a Day With Meals.      metoprolol succinate XL (TOPROL-XL) 100 MG 24 hr tablet Take 1 tablet by mouth Daily.      OLANZapine (zyPREXA) 20 MG tablet TAKE 1 TABLET EVERY NIGHT 90 tablet 3    thiamine (VITAMIN B1) 100 MG tablet       traZODone (DESYREL) 300 MG tablet TAKE 1 TABLET EVERY NIGHT 90 tablet 3    True Metrix Blood Glucose Test test strip 1 each by Other route Daily.      TRUEplus Lancets 33G misc       vitamin B-12 (CYANOCOBALAMIN) 1000 MCG tablet Take 1 tablet by mouth Daily.      dapagliflozin Propanediol (Farxiga) 10 MG tablet Take 1 tablet by mouth once daily 30 tablet 0    sulfamethoxazole-trimethoprim (BACTRIM DS,SEPTRA DS) 800-160 MG per tablet Take 1 tablet by mouth Every 12 (Twelve) Hours. (Patient not taking: Reported on 10/17/2024)       No current facility-administered medications for this visit.     Past Medical History:  Past Medical History:   Diagnosis Date    Alcohol abuse     Alcoholism     Anxiety     Bipolar disorder     COPD (chronic obstructive pulmonary disease)     Depression     Diabetes mellitus     Hyperlipidemia     Hypertension     Hypotension     Parkinson's disease     Pneumothorax     Psychiatric illness     Psychosis     Pulmonary emboli     Substance abuse     Unresponsive episode      Past Surgical History:  Past Surgical History:   Procedure Laterality Date    ABDOMINAL SURGERY      SPLENECTOMY       Mental Status Exam:   Appearance: well-groomed, sits upright, age-appropriate, average habitus  Behavior: sedated, cooperative, limited eye-contact  Mood/affect: euthymic / mood-congruent, blunted  Speech: within expected variance; appropriate rate, rhythm, tone; non-pressured  Thought Process: concrete, goal-directed; no FOI or BERTRAND  Thought  "Content: coherent, denies AVH, +grandiose delusions (believes he is [place])  SI/HI: denies both SI and HI; exhibits future-orientation, self-advocates appropriately, no regular self-harm, no appreciable intent  Memory: deficits apparent  Orientation: oriented to person  Concentration: appropriate during evaluation  Intellectual capacity: below average  Insight: partial by given history/exam  Judgment: fair by given history/exam  Psychomotor: appropriate  Gait: slowed    Review of Systems:  Review of Systems   Gastrointestinal:  Positive for abdominal pain and nausea.   Psychiatric/Behavioral:  Positive for sleep disturbance.      Vital Signs:   /89   Pulse 76   Ht 172.7 cm (68\")   Wt 83.9 kg (185 lb)   SpO2 95%   BMI 28.13 kg/m²      Lab Results:   Lab on 08/08/2024   Component Date Value Ref Range Status    Valproic Acid 08/08/2024 47.0 (L)  50.0 - 125.0 mcg/mL Final   Office Visit on 04/24/2024   Component Date Value Ref Range Status    Valproic Acid 04/24/2024 28.0 (L)  50.0 - 125.0 mcg/mL Final    Glucose 04/24/2024 136 (H)  65 - 99 mg/dL Final    BUN 04/24/2024 19  6 - 20 mg/dL Final    Creatinine 04/24/2024 1.17  0.76 - 1.27 mg/dL Final    Sodium 04/24/2024 142  136 - 145 mmol/L Final    Potassium 04/24/2024 5.1  3.5 - 5.2 mmol/L Final    Chloride 04/24/2024 105  98 - 107 mmol/L Final    CO2 04/24/2024 28.8  22.0 - 29.0 mmol/L Final    Calcium 04/24/2024 10.5  8.6 - 10.5 mg/dL Final    Total Protein 04/24/2024 7.3  6.0 - 8.5 g/dL Final    Albumin 04/24/2024 4.3  3.5 - 5.2 g/dL Final    ALT (SGPT) 04/24/2024 27  1 - 41 U/L Final    AST (SGOT) 04/24/2024 26  1 - 40 U/L Final    Alkaline Phosphatase 04/24/2024 99  39 - 117 U/L Final    Total Bilirubin 04/24/2024 0.2  0.0 - 1.2 mg/dL Final    Globulin 04/24/2024 3.0  gm/dL Final    A/G Ratio 04/24/2024 1.4  g/dL Final    BUN/Creatinine Ratio 04/24/2024 16.2  7.0 - 25.0 Final    Anion Gap 04/24/2024 8.2  5.0 - 15.0 mmol/L Final    eGFR 04/24/2024 74.5  " >60.0 mL/min/1.73 Final    WBC 04/24/2024 15.85 (H)  3.40 - 10.80 10*3/mm3 Final    RBC 04/24/2024 5.73  4.14 - 5.80 10*6/mm3 Final    Hemoglobin 04/24/2024 17.5  13.0 - 17.7 g/dL Final    Hematocrit 04/24/2024 52.8 (H)  37.5 - 51.0 % Final    MCV 04/24/2024 92.1  79.0 - 97.0 fL Final    MCH 04/24/2024 30.5  26.6 - 33.0 pg Final    MCHC 04/24/2024 33.1  31.5 - 35.7 g/dL Final    RDW 04/24/2024 13.8  12.3 - 15.4 % Final    RDW-SD 04/24/2024 46.3  37.0 - 54.0 fl Final    MPV 04/24/2024 10.7  6.0 - 12.0 fL Final    Platelets 04/24/2024 287  140 - 450 10*3/mm3 Final    Neutrophil % 04/24/2024 60.5  42.7 - 76.0 % Final    Lymphocyte % 04/24/2024 27.3  19.6 - 45.3 % Final    Monocyte % 04/24/2024 8.6  5.0 - 12.0 % Final    Eosinophil % 04/24/2024 2.6  0.3 - 6.2 % Final    Basophil % 04/24/2024 0.4  0.0 - 1.5 % Final    Immature Grans % 04/24/2024 0.6 (H)  0.0 - 0.5 % Final    Neutrophils, Absolute 04/24/2024 9.58 (H)  1.70 - 7.00 10*3/mm3 Final    Lymphocytes, Absolute 04/24/2024 4.33 (H)  0.70 - 3.10 10*3/mm3 Final    Monocytes, Absolute 04/24/2024 1.36 (H)  0.10 - 0.90 10*3/mm3 Final    Eosinophils, Absolute 04/24/2024 0.41 (H)  0.00 - 0.40 10*3/mm3 Final    Basophils, Absolute 04/24/2024 0.07  0.00 - 0.20 10*3/mm3 Final    Immature Grans, Absolute 04/24/2024 0.10 (H)  0.00 - 0.05 10*3/mm3 Final    nRBC 04/24/2024 0.0  0.0 - 0.2 /100 WBC Final     EKG Results:  No orders to display     Imaging Results:  No Images in the past 120 days found.    ASSESSMENT AND PLAN:    ICD-10-CM ICD-9-CM   1. Schizoaffective disorder, bipolar type  F25.0 295.70   2. ARIES (generalized anxiety disorder)  F41.1 300.02   3. Intellectual disability  F79 319   4. Insomnia, psychophysiological  F51.04 307.42     54 y.o. male who presents today for follow-up regarding schizoaffective disorder, ARIES, AUD in early remission in the context of ID. We have discussed the interval history and the treatment plan below, including potential R/B/SE of the  recommended regimen of which the patient demonstrates understanding. Patient is agreeable to call 911 or go to the nearest ER should he become concerned for his own safety and/or the safety of those around him. There are no overt indices of acute sulema/psychosis on exam today.    Medication regimen: no change - continue VPA, escitalopram, olanzapine, clonazepam (PRN), trazodone, benztropine; patient is advised not to misuse prescribed medications or to use them with any exogenous substances that aren't disclosed to this provider as they may interact with the regimen to the patient's detriment.   Risk assessment: protracted risk is high, imminent risk is low - some interval change. Do note that this is subject to change with the [place] of new stressors, treatment non-adherence, use of substances, and/or new medical ails.   Monitoring: VPA level 47 (08/08/2024)  Therapy: deferred  Follow-up: 2 months  Communications: N/A  Treatment plan: due, defer (at goal)    TREATMENT PLAN/GOALS: challenge patterns of living conducive to symptom burden, implement recommended regimen as above with augmentative, intermittent supportive psychotherapy to reduce symptom burden. Patient acknowledged and verbally consented to continue treatment. The importance of adherence to the recommended treatment and interval follow-up appointments was again emphasized today: patient has good treatment adherence per given history. Patient was today reminded to limit daily caffeine intake, hydrate appropriately, eat healthy and nutritious foods, engage sleep hygiene measures, engage appropriate exposure to sunlight, engage with hobbies in balance with life necessities, and exercise appropriate to their capacity to do so.     Billing: This encounter is of moderate complexity based on number/complexity of problems addressed today, amount/complexity of data reviewed today, risk of complications/morbidity: 2+ stable chronic illnesses and prescription  management.    Parts of this note are electronic transcriptions/translations of spoken language to printed text using the Dragon Dictation system.    Electronically signed by Jay Jay Boss MD, 10/17/24, 0430

## 2024-11-13 DIAGNOSIS — F25.0 SCHIZOAFFECTIVE DISORDER, BIPOLAR TYPE: ICD-10-CM

## 2024-11-13 DIAGNOSIS — F41.1 GAD (GENERALIZED ANXIETY DISORDER): ICD-10-CM

## 2024-11-13 RX ORDER — ESCITALOPRAM OXALATE 10 MG/1
10 TABLET ORAL DAILY
Qty: 30 TABLET | Refills: 2 | Status: SHIPPED | OUTPATIENT
Start: 2024-11-13

## 2024-11-13 NOTE — TELEPHONE ENCOUNTER
REFILL REQUEST:     escitalopram (LEXAPRO) 10 MG tablet (10/17/2024)     F/UP- 01/17/2025.  LOV: 10/17/2024.

## 2024-12-23 DIAGNOSIS — F51.04 INSOMNIA, PSYCHOPHYSIOLOGICAL: ICD-10-CM

## 2024-12-23 DIAGNOSIS — F25.0 SCHIZOAFFECTIVE DISORDER, BIPOLAR TYPE: ICD-10-CM

## 2024-12-23 RX ORDER — DIVALPROEX SODIUM 250 MG/1
250 TABLET, DELAYED RELEASE ORAL NIGHTLY
Qty: 90 TABLET | Refills: 0 | Status: SHIPPED | OUTPATIENT
Start: 2024-12-23

## 2024-12-23 RX ORDER — DIVALPROEX SODIUM 500 MG/1
TABLET, DELAYED RELEASE ORAL
Qty: 270 TABLET | Refills: 0
Start: 2024-12-23 | End: 2024-12-30 | Stop reason: SDUPTHER

## 2024-12-30 DIAGNOSIS — F51.04 INSOMNIA, PSYCHOPHYSIOLOGICAL: ICD-10-CM

## 2024-12-30 DIAGNOSIS — F25.0 SCHIZOAFFECTIVE DISORDER, BIPOLAR TYPE: ICD-10-CM

## 2024-12-30 RX ORDER — DIVALPROEX SODIUM 500 MG/1
TABLET, DELAYED RELEASE ORAL
Qty: 90 TABLET | Refills: 0
Start: 2024-12-30 | End: 2024-12-31 | Stop reason: SDUPTHER

## 2024-12-30 NOTE — TELEPHONE ENCOUNTER
Refill sent, appears Dr Boss sent in refill on 12/23/24, but pharmacy states they don't have one on file.    PT SISTER CALLED WANTS RX RESET TO [facility]

## 2024-12-31 DIAGNOSIS — F41.1 GAD (GENERALIZED ANXIETY DISORDER): ICD-10-CM

## 2024-12-31 RX ORDER — DIVALPROEX SODIUM 500 MG/1
TABLET, DELAYED RELEASE ORAL
Qty: 90 TABLET | Refills: 0 | Status: SHIPPED | OUTPATIENT
Start: 2024-12-31

## 2024-12-31 RX ORDER — CLONAZEPAM 0.5 MG/1
0.5 TABLET ORAL 2 TIMES DAILY
Qty: 60 TABLET | Refills: 0 | Status: SHIPPED | OUTPATIENT
Start: 2024-12-31

## 2024-12-31 NOTE — TELEPHONE ENCOUNTER
REFILL REQUEST:     clonazePAM (KlonoPIN) 0.5 MG tablet (06/13/2024)     F/UP- 01/17/2025.  LOV: 10/17/2024.    LAST UDS:  Urine Drug Screen - (06/22/2024 13:38)

## 2025-01-07 ENCOUNTER — TELEPHONE (OUTPATIENT)
Dept: PSYCHIATRY | Facility: CLINIC | Age: 55
End: 2025-01-07
Payer: MEDICARE

## 2025-01-07 DIAGNOSIS — F25.0 SCHIZOAFFECTIVE DISORDER, BIPOLAR TYPE: ICD-10-CM

## 2025-01-07 RX ORDER — DIVALPROEX SODIUM 250 MG/1
250 TABLET, DELAYED RELEASE ORAL NIGHTLY
Qty: 90 TABLET | Refills: 0 | OUTPATIENT
Start: 2025-01-07

## 2025-01-07 NOTE — TELEPHONE ENCOUNTER
divalproex (DEPAKOTE) 500 MG DR tablet (12/31/2024)   Dispense Quantity: 90 tablet Refills: 0    Note to Pharmacy: Refill to place on file         Sig: Take 1 tablet by mouth Daily AND 2 tablets Every Night.     PT(PATIENT) NOT DUE FOR REFILL UNTIL 1/31/2025

## 2025-01-07 NOTE — TELEPHONE ENCOUNTER
REFILL REQUEST:     divalproex (DEPAKOTE) 250 MG DR tablet (12/23/2024)     F/UP- 01/17/2025.  LOV: 10/17/2024.    LAST UDS:  Urine Drug Screen - (06/22/2024 13:38)     ROUTING TO COVERING PROVIDERS.

## 2025-01-16 NOTE — PROGRESS NOTES
"[facility]  Follow-up Visit    Chief Complaint: \"He was in the hospital back in August... psychotic break... that's what we're calling it... transported to the hospital from [facility]... unresponsive in a cell... (low pulse, BP)... he was telling everybody he was [place] and he was God... seen someone at [facility] for tremors because of the medications... the medications he's on, he's a zombie, he can't do anything... recently had an MRI showing that his pituitary gland is thickening... virtual visit... [facility] and he had decreased the Risperdal... he made sexual gestures.\"     History of Present Illness: Alec Medrano is a 54 y.o. male who presents today for follow-up. Last seen by this practice: 10/17 at which time no changes were made to his regimen.     Current treatment regimen includes:   - olanzapine 20 mg HS  -  mg QD + 1250 mg HS  - trazodone 300 mg HS  - benztropine 1 mg BID PRN tremors  - clonazepam 0.5 mg BID (hasn't been taking this, planned on PRN usage)  - escitalopram 10 mg HS    Alec presents on time and accompanied by his sister in no acute distress and engages with me appropriately. Today they report he's been doing fairly well. Schizoaffective symptoms are adequately managed with current interventions. Anxious symptoms are adequately managed with current interventions. Intellectual capacity is stable.   - sleep: fair, melatonin sometimes helps  - appetite: generally adequate; +22 lb since last visit (+9 lb over last three visits)  - medication adverse effects: sedation and dulling to his personality    Interval History and Clinical Commentary:   Ego-syntonic. Alec presents in a fashion consistent with prior assessments with regard to MSE.    VPA level 47 (08/08/2024)    Interval history reported to be generally unremarkable. Discussed weight gain as a potential SE, likely offenders being VPA and/or olanzapine. As of now all parties feel " B>R; patient and sister amenable to proceed without change today and continue to monitor weight changes.    Axis I: schizoaffective disorder (bipolar type), ARIES  Axis II: ID  Axis III: defer  Axis IV: defer  Axis V: 60    Differential considerations: N/A    Adherence:  Treatment adherence is appropriate; issues in this regard have included: N/A. Patient is advised not to misuse prescribed medications or to use them with any exogenous substances that aren't disclosed to this provider as they may interact with the regimen to the patient's detriment. The importance of adherence to the recommended treatment and interval follow-up appointments has been emphasized.     Education:  I have counseled Alec with regard to diagnoses and the recommended treatment regimen as documented below. I have advised that because medications can elicit idiosyncratic reactions in different individuals that SE may present in ways that haven't been discussed. I have reiterated the importance of discussing with me any clinical changes that could represent potential adverse effects regarding the medication regimen.    Patient acknowledges the diagnoses per my rendered interpretation. Patient is agreeable to call 911 or go to the nearest ER should he become concerned for his own safety and/or the safety of those around him. Patient demonstrates understanding of potential risks/benefits/side effects associated with the recommended treatment regimen and is amenable to proceed in the fashion outlined below. Patient has been encouraged to cultivate constructive patterns of living including limiting daily caffeine intake, hydrating appropriately, regularly eating nutritious foods, engaging sleep hygiene practices, engaging in appropriate exposure to sunlight, engaging with hobbies in balance with life necessities, and exercising appropriate to their capacity to do so.    Risk:  There is no significant change to risk profile discernible during today's  evaluation - do note that this is subject to change with the Yazidism of new stressors, treatment non-adherence, use of substances, and/or new medical ails. There is no appreciable evidence of intent for harm to self or others. There are no appreciable indices of sulema/psychosis.    Contraception and mitigation of potential teratogenicity: patient does not have a uterus.    Psychotherapy:  - Time: N/A   - interventions employed: the therapeutic alliance was strengthened to encourage the patient to express their thoughts and feelings freely. Esteem building was enhanced through praise, reassurance, normalizing/challenging, and encouragement as appropriate. Coping skills were enhanced to build distress tolerance skills and emotional regulation. Allowed patient to freely discuss issues without interruption or judgement with unconditional positive regard, active listening skills, and empathy. Provided a safe, confidential environment to facilitate the development of a positive therapeutic relationship and encourage open, honest communication. Assisted patient in processing session content; acknowledged and normalized/addressed, as appropriate, patient’s thoughts, feelings, and concerns by utilizing a person-centered approach in efforts to build appropriate rapport and a positive therapeutic relationship with open and honest communication.   - Diagnoses: see assessment and plan below  - Symptoms: see subjective above  - Goals              - patient: sustain improvements seen in reduced psychotic symptoms, sustain mood improvements, continue to bolster functional capacity              - provider: challenge patterns of living conducive to pathology, strengthen defenses, promote problems solving, restore adaptive functioning and provide symptom relief.  - Treatment plan: continue supportive psychotherapy in subsequent appointments to provide symptom relief; see assessment and plan below for additional details:              -  "iteration: 1              - progress: at goal              - (X)illumination, (X)contextualization, (X)detection, (X)development, (X)elaboration, (X)refinement  - functional status: optimized  - mental status exam: as below  - prognosis: fair    Psychiatric History:  Diagnoses: ID, sisters report other diagnoses haven't been rendered  Outpatient history: around 1 month ago with [facility]  Inpatient history: [facility]  Medication trials: risperidone (tremors, drooling, overly sedated), duloxetine, trazodone  Other treatment modalities: has done therapy  Presenting regimen:  mg BID (recently tried to taper and began exhibiting sexual gestures)  Self harm: denies  Suicide attempts: denies     Substance Abuse History:   Types/methods/frequency: history of alcohol abuse/dependence     Social History:  Residence: lives with his sisters back and forth, requiring 24-hour monitoring  Vocation: denies  Education: HS diploma  Pertinent developmental history: had LD, was in special educational classes; denies abuse hx  Pertinent legal history: child support violation in the past, attempted escape, assault (after throwing urine on another inmate), PI, possession of marijuana  Hobbies/interests: defer  Catholic: \"I believe in God\"  Exercise: walks  Dietary habits: defer  Sleep hygiene: defer  Social habits: no pertinent issues  Sunlight: no concern for under-exposure  Caffeine intake: 1 cup of coffee, occasional tea, soda (3 daily)  Hydration habits: doesn't drink enough water   history: denies    Social History     Socioeconomic History    Marital status: Unknown   Tobacco Use    Smoking status: Every Day     Current packs/day: 1.00     Average packs/day: 1.3 packs/day for 45.0 years (60.0 ttl pk-yrs)     Types: Cigarettes     Passive exposure: Past    Smokeless tobacco: Never   Vaping Use    Vaping status: Never Used   Substance and Sexual Activity    Alcohol use: Not Currently    Drug use: Never    Sexual " activity: Not Currently     Tobacco use counseling/intervention: patient has been counseled with regard to risks of tobacco use and encouraged to consider quitting, with or without the use of adjunctive medication, by first setting a prospective quit date. Currently contemplative by transtheoretical model.     PHQ-9 Depression Screening  PHQ-9 Total Score:  12    Little interest or pleasure in doing things? Almost all   Feeling down, depressed, or hopeless? Not at all   PHQ-2 Total Score 3   Trouble falling or staying asleep, or sleeping too much? Almost all   Feeling tired or having little energy? Almost all   Poor appetite or overeating? Not at all   Feeling bad about yourself - or that you are a failure or have let yourself or your family down? Not at all   Trouble concentrating on things, such as reading the newspaper or watching television? Almost all   Moving or speaking so slowly that other people could have noticed? Or the opposite - being so fidgety or restless that you have been moving around a lot more than usual? Not at all   Thoughts that you would be better off dead, or of hurting yourself in some way? Not at all   PHQ-9 Total Score 12   If you checked off any problems, how difficult have these problems made it for you to do your work, take care of things at home, or get along with other people? Not difficult at all     Change in PHQ-9 since last measure: +2 (10)    ARIES-7  Feeling nervous, anxious or on edge: Not at all  Not being able to stop or control worrying: Not at all  Worrying too much about different things: Not at all  Trouble Relaxing: Not at all  Being so restless that it is hard to sit still: Not at all  Feeling afraid as if something awful might happen: Not at all  Becoming easily annoyed or irritable: Not at all  ARIES 7 Total Score: 0  If you checked any problems, how difficult have these problems made it for you to do your work, take care of things at home, or get along with other  people: Not difficult at all    Change in ARIES-7 since last measure: -1 (1)    Problem List:  Patient Active Problem List   Diagnosis    Schizoaffective disorder, bipolar type    Alcohol use disorder, moderate, in early remission, dependence    Intellectual disability    Insomnia, psychophysiological    ARIES (generalized anxiety disorder)     Allergy:   Allergies   Allergen Reactions    Haloperidol Anxiety, Mental Status Change and Irritability      Discontinued Medications:  Medications Discontinued During This Encounter   Medication Reason    levoFLOXacin (LEVAQUIN) 750 MG tablet *Therapy completed    mupirocin (BACTROBAN) 2 % ointment *Therapy completed    sulfamethoxazole-trimethoprim (BACTRIM DS,SEPTRA DS) 800-160 MG per tablet *Therapy completed    escitalopram (LEXAPRO) 10 MG tablet Reorder    divalproex (DEPAKOTE) 250 MG DR tablet Reorder    divalproex (DEPAKOTE) 500 MG DR tablet Reorder    clonazePAM (KlonoPIN) 0.5 MG tablet Reorder     Current Medications:   Current Outpatient Medications   Medication Sig Dispense Refill    albuterol sulfate  (90 Base) MCG/ACT inhaler Inhale 2 puffs.      Alcohol Swabs (Splash Technologyafe Alcohol Prep) 70 % pads       apixaban (Eliquis) 5 MG tablet tablet Take 1 tablet by mouth Every 12 (Twelve) Hours.      atorvastatin (LIPITOR) 40 MG tablet Take 1 tablet by mouth every night at bedtime.      benztropine (COGENTIN) 1 MG tablet TAKE 1 TABLET BY MOUTH 2 TIMES A DAY AS NEEDED FOR TREMORS OR DYSTONIA      Bevespi Aerosphere 9-4.8 MCG/ACT aerosol Inhale 2 sprays 2 (Two) Times a Day.      Blood Glucose Monitoring Suppl (True Metrix Air Glucose Meter) w/Device kit       [START ON 2/6/2025] clonazePAM (KlonoPIN) 0.5 MG tablet Take 1 tablet by mouth 2 (Two) Times a Day. 60 tablet 2    dapagliflozin Propanediol (Farxiga) 10 MG tablet Take 1 tablet by mouth once daily 30 tablet 0    divalproex (DEPAKOTE) 250 MG DR tablet Take 1 tablet by mouth Every Night. 90 tablet 0    divalproex  (DEPAKOTE) 500 MG DR tablet Take 1 tablet by mouth Daily AND 2 tablets Every Night. 270 tablet 0    escitalopram (LEXAPRO) 10 MG tablet Take 1 tablet by mouth Daily. 90 tablet 0    famotidine (PEPCID) 20 MG tablet Take 1 tablet by mouth 2 (Two) Times a Day.      folic acid (FOLVITE) 1 MG tablet Take 1 tablet by mouth Daily.      glipizide (GLUCOTROL XL) 10 MG 24 hr tablet       hydroCHLOROthiazide 25 MG tablet Take 1 tablet by mouth Daily.      metFORMIN (GLUCOPHAGE) 500 MG tablet Take 1 tablet by mouth 2 (Two) Times a Day With Meals. PT TAKES 2 TABLETS TWICE DAILY      metoprolol succinate XL (TOPROL-XL) 100 MG 24 hr tablet Take 1 tablet by mouth Daily.      OLANZapine (zyPREXA) 20 MG tablet TAKE 1 TABLET EVERY NIGHT 90 tablet 3    thiamine (VITAMIN B1) 100 MG tablet       traZODone (DESYREL) 300 MG tablet TAKE 1 TABLET EVERY NIGHT 90 tablet 3    True Metrix Blood Glucose Test test strip 1 each by Other route Daily.      TRUEplus Lancets 33G misc       vitamin B-12 (CYANOCOBALAMIN) 1000 MCG tablet Take 1 tablet by mouth Daily.       No current facility-administered medications for this visit.     Past Medical History:  Past Medical History:   Diagnosis Date    Alcohol abuse     Alcoholism     Anxiety     Bipolar disorder     COPD (chronic obstructive pulmonary disease)     Depression     Diabetes mellitus     Hyperlipidemia     Hypertension     Hypotension     Parkinson's disease     Pneumothorax     Psychiatric illness     Psychosis     Pulmonary emboli     Substance abuse     Unresponsive episode      Past Surgical History:  Past Surgical History:   Procedure Laterality Date    ABDOMINAL SURGERY      SPLENECTOMY       Mental Status Exam:   Appearance: well-groomed, sits upright, age-appropriate, above average habitus  Behavior: sedated, cooperative, limited eye-contact  Mood/affect: euthymic / mood-congruent, blunted  Speech: within expected variance; appropriate rate, rhythm, tone; non-pressured  Thought  "Process: concrete, goal-directed; no FOI or BERTARND  Thought Content: coherent, denies AVH, does not vocalize prior delusions today (no concerns from sister in this regard)  SI/HI: denies both SI and HI; exhibits future-orientation, self-advocates appropriately, no regular self-harm, no appreciable intent  Memory: deficits apparent  Orientation: oriented to person  Concentration: appropriate during evaluation  Intellectual capacity: below average  Insight: partial by given history/exam  Judgment: fair by given history/exam  Psychomotor: appropriate  Gait: slowed    Review of Systems:  Review of Systems   Constitutional:  Positive for fatigue. Negative for chills, diaphoresis and fever.   HENT:  Negative for congestion and sore throat.    Respiratory:  Negative for cough.    Cardiovascular:  Negative for chest pain.   Gastrointestinal:  Positive for abdominal pain. Negative for nausea and vomiting.   Genitourinary:  Negative for dysuria.   Musculoskeletal:  Negative for myalgias and neck pain.   Skin:  Negative for rash.   Neurological:  Positive for headaches. Negative for weakness and numbness.     Vital Signs:   /81   Pulse 59   Ht 172.7 cm (68\")   Wt 94.1 kg (207 lb 6.4 oz)   BMI 31.54 kg/m²      Lab Results:   Lab on 08/08/2024   Component Date Value Ref Range Status    Valproic Acid 08/08/2024 47.0 (L)  50.0 - 125.0 mcg/mL Final     EKG Results:  No orders to display     Imaging Results:  No Images in the past 120 days found.    ASSESSMENT AND PLAN:    ICD-10-CM ICD-9-CM   1. Schizoaffective disorder, bipolar type  F25.0 295.70   2. Insomnia, psychophysiological  F51.04 307.42   3. ARIES (generalized anxiety disorder)  F41.1 300.02   4. Intellectual disability  F79 319     54 y.o. male who presents today for follow-up. We have discussed the interval history and the treatment plan below:      Medication regimen: no change - continue VPA, escitalopram, olanzapine, clonazepam (PRN), trazodone, " benztropine  Monitoring: reviewed labs as populated above; plan to order maintenance labs at next visit   Primary psychotherapy: deferred  Follow-up: 3 months  Communications: N/A  Treatment plan: N/A (at goal)    TREATMENT PLAN/GOALS: challenge patterns of living conducive to symptom burden, implement recommended regimen as above with augmentative, intermittent supportive psychotherapy to reduce symptom burden. Patient acknowledged and verbally consented to continue treatment.      Billing: This encounter is of moderate complexity based on number/complexity of problems addressed today and risk of complications/morbidity: 2+ stable chronic illnesses and and prescription management.     Electronically signed by Jay Jay Boss MD, 01/17/25, [id number]

## 2025-01-17 ENCOUNTER — OFFICE VISIT (OUTPATIENT)
Dept: PSYCHIATRY | Facility: CLINIC | Age: 55
End: 2025-01-17
Payer: MEDICARE

## 2025-01-17 VITALS
BODY MASS INDEX: 31.43 KG/M2 | WEIGHT: 207.4 LBS | SYSTOLIC BLOOD PRESSURE: 148 MMHG | HEART RATE: 59 BPM | DIASTOLIC BLOOD PRESSURE: 81 MMHG | HEIGHT: 68 IN

## 2025-01-17 DIAGNOSIS — F51.04 INSOMNIA, PSYCHOPHYSIOLOGICAL: ICD-10-CM

## 2025-01-17 DIAGNOSIS — F25.0 SCHIZOAFFECTIVE DISORDER, BIPOLAR TYPE: Primary | ICD-10-CM

## 2025-01-17 DIAGNOSIS — F79 INTELLECTUAL DISABILITY: ICD-10-CM

## 2025-01-17 DIAGNOSIS — F41.1 GAD (GENERALIZED ANXIETY DISORDER): ICD-10-CM

## 2025-01-17 RX ORDER — DIVALPROEX SODIUM 500 MG/1
TABLET, DELAYED RELEASE ORAL
Qty: 270 TABLET | Refills: 0 | Status: SHIPPED | OUTPATIENT
Start: 2025-01-17

## 2025-01-17 RX ORDER — MUPIROCIN 20 MG/G
OINTMENT TOPICAL
COMMUNITY
Start: 2024-12-20 | End: 2025-01-17

## 2025-01-17 RX ORDER — DIVALPROEX SODIUM 250 MG/1
250 TABLET, DELAYED RELEASE ORAL NIGHTLY
Qty: 90 TABLET | Refills: 0 | Status: SHIPPED | OUTPATIENT
Start: 2025-01-17

## 2025-01-17 RX ORDER — ESCITALOPRAM OXALATE 10 MG/1
10 TABLET ORAL DAILY
Qty: 90 TABLET | Refills: 0 | Status: SHIPPED | OUTPATIENT
Start: 2025-01-17

## 2025-01-17 RX ORDER — CLONAZEPAM 0.5 MG/1
0.5 TABLET ORAL 2 TIMES DAILY
Qty: 60 TABLET | Refills: 2 | Status: SHIPPED | OUTPATIENT
Start: 2025-02-06

## 2025-03-31 DIAGNOSIS — F25.0 SCHIZOAFFECTIVE DISORDER, BIPOLAR TYPE: ICD-10-CM

## 2025-03-31 DIAGNOSIS — F51.04 INSOMNIA, PSYCHOPHYSIOLOGICAL: ICD-10-CM

## 2025-03-31 RX ORDER — DIVALPROEX SODIUM 500 MG/1
TABLET, DELAYED RELEASE ORAL
Qty: 270 TABLET | Refills: 3 | Status: SHIPPED | OUTPATIENT
Start: 2025-03-31

## 2025-03-31 NOTE — TELEPHONE ENCOUNTER
NEXT VISIT WITH PROVIDER   Appointment with Jay Jay Boss MD (04/18/2025)     LAST SEEN BY PROVIDER   Office Visit with Jay Jay Boss MD (01/17/2025)     LAST MED REFILL  divalproex (DEPAKOTE) 250 MG DR tablet (01/17/2025)  divalproex (DEPAKOTE) 500 MG DR tablet (01/17/2025)    PROVIDER PLEASE ADVISE

## 2025-04-08 DIAGNOSIS — F41.1 GAD (GENERALIZED ANXIETY DISORDER): ICD-10-CM

## 2025-04-08 DIAGNOSIS — F25.0 SCHIZOAFFECTIVE DISORDER, BIPOLAR TYPE: ICD-10-CM

## 2025-04-08 NOTE — TELEPHONE ENCOUNTER
NEXT VISIT WITH PROVIDER   Appointment with Jay Jay Boss MD (04/18/2025)     LAST SEEN BY PROVIDER   Office Visit with Jay Jay Boss MD (01/17/2025)     LAST MED REFILL  escitalopram (LEXAPRO) 10 MG tablet (01/17/2025)     PROVIDER PLEASE ADVISE

## 2025-04-09 RX ORDER — ESCITALOPRAM OXALATE 10 MG/1
10 TABLET ORAL DAILY
Qty: 90 TABLET | Refills: 0 | Status: SHIPPED | OUTPATIENT
Start: 2025-04-09

## 2025-04-17 NOTE — PROGRESS NOTES
"[facility]  Follow-up Visit    Chief Complaint: \"He was in the hospital back in August... psychotic break... that's what we're calling it... transported to the hospital from [facility]... unresponsive in a cell... (low pulse, BP)... he was telling everybody he was [place] and he was God... seen someone at [facility] for tremors because of the medications... the medications he's on, he's a zombie, he can't do anything... recently had an MRI showing that his pituitary gland is thickening... virtual visit... [facility] and he had decreased the Risperdal... he made sexual gestures.\"     History of Present Illness: Alec Medrano is a 55 y.o. male who presents today for follow-up. Last seen by this practice: 01/17 at which time no changes were made to his regimen.     Current treatment regimen includes:   - olanzapine 20 mg HS  -  mg QD + 1250 mg HS  - trazodone 300 mg HS  - benztropine 1 mg BID PRN tremors  - clonazepam 0.5 mg BID PRN anxiety   - escitalopram 10 mg HS    Alec presents on time and accompanied by his sister in no acute distress and engages with me appropriately. Today they report he's been doing fairly well. Schizoaffective and anxious symptoms are manageable with current interventions. Intellectual capacity is stable. Though he is doing well with regard to symptoms of concern, he is having some issues with his medication in the sense of enduring SE as below. He would like to optimize his regimen for effect and tolerability, especially as it relates to nighttime and sleep medications.  - sleep: fair, melatonin sometimes helps  - appetite: generally adequate; +19 lb since last visit  - medication adverse effects: sedation and dulling to his personality, dizziness    Interval History and Clinical Commentary:   Ego-syntonic. Alec presents in a fashion consistent with prior assessments with regard to MSE.    Has a cruise planned for July. Interval history " reported to be generally unremarkable.     Axis I: schizoaffective disorder (bipolar type), ARIES  Axis II: ID  Axis III: defer  Axis IV: defer  Axis V: 60    Differential considerations: N/A    Adherence:  Treatment adherence is appropriate; issues in this regard have included: N/A. Patient is advised not to misuse prescribed medications or to use them with any exogenous substances that aren't disclosed to this provider as they may interact with the regimen to the patient's detriment. The importance of adherence to the recommended treatment and interval follow-up appointments has been emphasized.     Education:  I have counseled Alec with regard to diagnoses and the recommended treatment regimen as documented below: I will begin eszopiclone for insomnia; patient advised of potential SE with eszopiclone including sedation, aberrant sleep behaviors, hallucinations, anxiety, and xerostomia in addition to possible development of tolerance and dependence with long term use. I have advised that because medications can elicit idiosyncratic reactions in different individuals that SE may present in ways that haven't been discussed. I have reiterated the importance of discussing with me any clinical changes that could represent potential adverse effects regarding the medication regimen.    Patient acknowledges the diagnoses per my rendered interpretation. Patient is agreeable to call 911 or go to the nearest ER should he become concerned for his own safety and/or the safety of those around him. Patient demonstrates understanding of potential risks/benefits/side effects associated with the recommended treatment regimen and is amenable to proceed in the fashion outlined below. Patient has been encouraged to cultivate constructive patterns of living including limiting daily caffeine intake, hydrating appropriately, regularly eating nutritious foods, engaging sleep hygiene practices, engaging in appropriate exposure to sunlight,  engaging with hobbies in balance with life necessities, and exercising appropriate to their capacity to do so.    Risk:  There is no significant change to risk profile discernible during today's evaluation - do note that this is subject to change with the Restorationism of new stressors, treatment non-adherence, use of substances, and/or new medical ails. There is no appreciable evidence of intent for harm to self or others. There are no appreciable indices of sulema/psychosis.    Contraception and mitigation of potential teratogenicity: patient does not have a uterus.    Psychotherapy:  - Time: N/A   - interventions employed: the therapeutic alliance was strengthened to encourage the patient to express their thoughts and feelings freely. Esteem building was enhanced through praise, reassurance, normalizing/challenging, and encouragement as appropriate. Coping skills were enhanced to build distress tolerance skills and emotional regulation. Allowed patient to freely discuss issues without interruption or judgement with unconditional positive regard, active listening skills, and empathy. Provided a safe, confidential environment to facilitate the development of a positive therapeutic relationship and encourage open, honest communication. Assisted patient in processing session content; acknowledged and normalized/addressed, as appropriate, patient’s thoughts, feelings, and concerns by utilizing a person-centered approach in efforts to build appropriate rapport and a positive therapeutic relationship with open and honest communication.   - Diagnoses: see assessment and plan below  - Symptoms: see subjective above  - Goals              - patient: sustain improvements seen in reduced psychotic symptoms, sustain mood improvements, continue to bolster functional capacity              - provider: challenge patterns of living conducive to pathology, strengthen defenses, promote problems solving, restore adaptive functioning and  "provide symptom relief.  - Treatment plan: continue supportive psychotherapy in subsequent appointments to provide symptom relief; see assessment and plan below for additional details:              - iteration: 1              - progress: at goal              - (X)illumination, (X)contextualization, (X)detection, (X)development, (X)elaboration, (X)refinement  - functional status: optimized  - mental status exam: as below  - prognosis: fair    Psychiatric History:  Diagnoses: ID, sisters report other diagnoses haven't been rendered  Outpatient history: around 1 month ago with [facility]  Inpatient history: [facility]  Medication trials: risperidone (tremors, drooling, overly sedated), duloxetine, trazodone  Other treatment modalities: has done therapy  Presenting regimen:  mg BID (recently tried to taper and began exhibiting sexual gestures)  Self harm: denies  Suicide attempts: denies     Substance Abuse History:   Types/methods/frequency: history of alcohol abuse/dependence     Social History:  Residence: lives with his sisters back and forth, requiring 24-hour monitoring  Vocation: denies  Education: HS diploma  Pertinent developmental history: had LD, was in special educational classes; denies abuse hx  Pertinent legal history: child support violation in the past, attempted escape, assault (after throwing urine on another inmate), PI, possession of marijuana  Hobbies/interests: defer  Sabianist: \"I believe in God\"  Exercise: walks  Dietary habits: defer  Sleep hygiene: defer  Social habits: no pertinent issues  Sunlight: no concern for under-exposure  Caffeine intake: 1 cup of coffee, occasional tea, soda (3 daily)  Hydration habits: doesn't drink enough water   history: denies    Social History     Socioeconomic History    Marital status: Unknown   Tobacco Use    Smoking status: Every Day     Current packs/day: 1.00     Average packs/day: 1.3 packs/day for 45.0 years (60.0 ttl pk-yrs)     Types: " Cigarettes     Passive exposure: Current    Smokeless tobacco: Never   Vaping Use    Vaping status: Never Used   Substance and Sexual Activity    Alcohol use: Not Currently    Drug use: Never    Sexual activity: Not Currently     Tobacco use counseling/intervention: patient has been counseled with regard to risks of tobacco use and encouraged to consider quitting, with or without the use of adjunctive medication, by first setting a prospective quit date. Currently contemplative by transtheoretical model.     PHQ-9 Depression Screening  PHQ-9 Total Score:       Little interest or pleasure in doing things?     Feeling down, depressed, or hopeless?     PHQ-2 Total Score     Trouble falling or staying asleep, or sleeping too much?     Feeling tired or having little energy?     Poor appetite or overeating?     Feeling bad about yourself - or that you are a failure or have let yourself or your family down?     Trouble concentrating on things, such as reading the newspaper or watching television?     Moving or speaking so slowly that other people could have noticed? Or the opposite - being so fidgety or restless that you have been moving around a lot more than usual?     Thoughts that you would be better off dead, or of hurting yourself in some way?     PHQ-9 Total Score     If you checked off any problems, how difficult have these problems made it for you to do your work, take care of things at home, or get along with other people?       Change in PHQ-9 since last measure: N/A (12)    ARIES-7       Change in ARIES-7 since last measure: N/A (0)    Problem List:  Patient Active Problem List   Diagnosis    Schizoaffective disorder, bipolar type    Alcohol use disorder, moderate, in early remission, dependence    Intellectual disability    Insomnia, psychophysiological    ARIES (generalized anxiety disorder)     Allergy:   Allergies   Allergen Reactions    Haloperidol Anxiety, Mental Status Change and Irritability      Discontinued  Medications:  Medications Discontinued During This Encounter   Medication Reason    traZODone (DESYREL) 300 MG tablet     OLANZapine (zyPREXA) 20 MG tablet Reorder    divalproex (DEPAKOTE) 250 MG DR tablet Reorder       Current Medications:   Current Outpatient Medications   Medication Sig Dispense Refill    albuterol sulfate  (90 Base) MCG/ACT inhaler Inhale 2 puffs.      Alcohol Swabs (DropSafe Alcohol Prep) 70 % pads       apixaban (Eliquis) 5 MG tablet tablet Take 1 tablet by mouth Every 12 (Twelve) Hours.      atorvastatin (LIPITOR) 40 MG tablet Take 1 tablet by mouth every night at bedtime.      benztropine (COGENTIN) 1 MG tablet TAKE 1 TABLET BY MOUTH 2 TIMES A DAY AS NEEDED FOR TREMORS OR DYSTONIA      Bevespi Aerosphere 9-4.8 MCG/ACT aerosol Inhale 2 sprays 2 (Two) Times a Day.      Blood Glucose Monitoring Suppl (True Metrix Air Glucose Meter) w/Device kit       clonazePAM (KlonoPIN) 0.5 MG tablet Take 1 tablet by mouth 2 (Two) Times a Day. 60 tablet 2    divalproex (DEPAKOTE) 250 MG DR tablet Take 1 tablet by mouth Every Night. 90 tablet 3    divalproex (DEPAKOTE) 500 MG DR tablet TAKE 1 TABLET BY MOUTH DAILY AND 2 TABLETS EVERY NIGHT AS DIRECTED 270 tablet 3    escitalopram (LEXAPRO) 10 MG tablet TAKE 1 TABLET EVERY DAY 90 tablet 0    famotidine (PEPCID) 20 MG tablet Take 1 tablet by mouth 2 (Two) Times a Day.      folic acid (FOLVITE) 1 MG tablet Take 1 tablet by mouth Daily.      glipizide (GLUCOTROL XL) 10 MG 24 hr tablet       hydroCHLOROthiazide 25 MG tablet Take 1 tablet by mouth Daily.      metFORMIN (GLUCOPHAGE) 500 MG tablet Take 1 tablet by mouth 2 (Two) Times a Day With Meals. PT TAKES 2 TABLETS TWICE DAILY      metFORMIN ER (GLUCOPHAGE-XR) 500 MG 24 hr tablet Take 2 tablets by mouth 2 (Two) Times a Day With Meals.      metoprolol succinate XL (TOPROL-XL) 100 MG 24 hr tablet Take 1 tablet by mouth Daily.      OLANZapine (zyPREXA) 10 MG tablet Take 1 tablet by mouth Every Night. 90  tablet 0    thiamine (VITAMIN B1) 100 MG tablet       tiotropium bromide-olodaterol (Stiolto Respimat) 2.5-2.5 MCG/ACT aerosol solution inhaler Inhale 2 puffs Daily.      True Metrix Blood Glucose Test test strip 1 each by Other route Daily.      TRUEplus Lancets 33G misc       vitamin B-12 (CYANOCOBALAMIN) 1000 MCG tablet Take 1 tablet by mouth Daily.      dapagliflozin Propanediol (Farxiga) 10 MG tablet Take 1 tablet by mouth once daily 30 tablet 0    eszopiclone (Lunesta) 3 MG tablet Take 1 tablet by mouth At Night As Needed for Sleep. Take immediately before bedtime 30 tablet 1     No current facility-administered medications for this visit.     Past Medical History:  Past Medical History:   Diagnosis Date    Alcohol abuse     Alcoholism     Anxiety     Bipolar disorder     COPD (chronic obstructive pulmonary disease)     Depression     Diabetes mellitus     Hyperlipidemia     Hypertension     Hypotension     Parkinson's disease     Pneumothorax     Psychiatric illness     Psychosis     Pulmonary emboli     Substance abuse     Unresponsive episode      Past Surgical History:  Past Surgical History:   Procedure Laterality Date    ABDOMINAL SURGERY      SPLENECTOMY       Mental Status Exam:   Appearance: well-groomed, sits upright, age-appropriate, above average habitus  Behavior: sedated, cooperative, limited eye-contact  Mood/affect: euthymic / mood-congruent, blunted  Speech: within expected variance; appropriate rate, rhythm, tone; non-pressured  Thought Process: concrete, goal-directed; no FOI or BERTRAND  Thought Content: coherent, denies AVH, does not vocalize prior delusions today (no concerns from sister in this regard)  SI/HI: denies both SI and HI; exhibits future-orientation, self-advocates appropriately, no regular self-harm, no appreciable intent  Memory: deficits apparent  Orientation: oriented to person  Concentration: appropriate during evaluation  Intellectual capacity: below average  Insight: partial  "by given history/exam  Judgment: fair by given history/exam  Psychomotor: appropriate  Gait: slowed    Review of Systems:  Review of Systems   Constitutional:  Positive for unexpected weight change. Negative for activity change and appetite change.   Gastrointestinal:  Negative for abdominal pain.   Neurological:  Positive for dizziness.   Psychiatric/Behavioral:  Positive for sleep disturbance. Negative for agitation.      Vital Signs:   /73   Pulse 68   Ht 172.7 cm (67.99\")   Wt 103 kg (226 lb)   BMI 34.37 kg/m²      Lab Results:   No visits with results within 6 Month(s) from this visit.   Latest known visit with results is:   Lab on 08/08/2024   Component Date Value Ref Range Status    Valproic Acid 08/08/2024 47.0 (L)  50.0 - 125.0 mcg/mL Final     EKG Results:  No orders to display     Imaging Results:  No Images in the past 120 days found.    ASSESSMENT AND PLAN:    ICD-10-CM ICD-9-CM   1. Schizoaffective disorder, bipolar type  F25.0 295.70   2. ARIES (generalized anxiety disorder)  F41.1 300.02   3. Insomnia, psychophysiological  F51.04 307.42   4. Intellectual disability  F79 [id number]     55 y.o. male who presents today for follow-up. We have discussed the interval history and the treatment plan below:      Medication regimen: discontinue trazodone, taper olanzapine to 10 mg HS, begin eszopiclone 3 mg HS - continue VPA, escitalopram, clonazepam (PRN), benztropine  Monitoring: reviewed labs as populated above; plan to order maintenance labs at next visit   Primary psychotherapy: deferred  Follow-up: 6 weeks  Communications: N/A  Treatment plan: N/A (at goal)    TREATMENT PLAN/GOALS: challenge patterns of living conducive to symptom burden, implement recommended regimen as above with augmentative, intermittent supportive psychotherapy to reduce symptom burden. Patient acknowledged and verbally consented to continue treatment.      Billing: This encounter is of moderate complexity based on " number/complexity of problems addressed today and risk of complications/morbidity: 2+ stable chronic illnesses and and prescription management.     Electronically signed by Jay Jay Boss MD, 04/18/25, 1019

## 2025-04-18 ENCOUNTER — OFFICE VISIT (OUTPATIENT)
Dept: PSYCHIATRY | Facility: CLINIC | Age: 55
End: 2025-04-18
Payer: MEDICARE

## 2025-04-18 VITALS
HEART RATE: 68 BPM | SYSTOLIC BLOOD PRESSURE: 102 MMHG | WEIGHT: 226 LBS | BODY MASS INDEX: 34.25 KG/M2 | DIASTOLIC BLOOD PRESSURE: 73 MMHG | HEIGHT: 68 IN

## 2025-04-18 DIAGNOSIS — F79 INTELLECTUAL DISABILITY: ICD-10-CM

## 2025-04-18 DIAGNOSIS — F41.1 GAD (GENERALIZED ANXIETY DISORDER): ICD-10-CM

## 2025-04-18 DIAGNOSIS — F25.0 SCHIZOAFFECTIVE DISORDER, BIPOLAR TYPE: Primary | ICD-10-CM

## 2025-04-18 DIAGNOSIS — F51.04 INSOMNIA, PSYCHOPHYSIOLOGICAL: ICD-10-CM

## 2025-04-18 RX ORDER — TIOTROPIUM BROMIDE AND OLODATEROL 3.124; 2.736 UG/1; UG/1
2 SPRAY, METERED RESPIRATORY (INHALATION) DAILY
COMMUNITY
Start: 2025-04-04

## 2025-04-18 RX ORDER — DIVALPROEX SODIUM 250 MG/1
250 TABLET, DELAYED RELEASE ORAL NIGHTLY
Qty: 90 TABLET | Refills: 3 | Status: SHIPPED | OUTPATIENT
Start: 2025-04-18

## 2025-04-18 RX ORDER — METFORMIN HYDROCHLORIDE 500 MG/1
2 TABLET, EXTENDED RELEASE ORAL 2 TIMES DAILY WITH MEALS
COMMUNITY
Start: 2025-04-17

## 2025-04-18 RX ORDER — ESZOPICLONE 3 MG/1
3 TABLET, FILM COATED ORAL NIGHTLY PRN
Qty: 30 TABLET | Refills: 1 | Status: SHIPPED | OUTPATIENT
Start: 2025-04-18

## 2025-04-18 RX ORDER — OLANZAPINE 10 MG/1
10 TABLET ORAL NIGHTLY
Qty: 90 TABLET | Refills: 0 | Status: SHIPPED | OUTPATIENT
Start: 2025-04-18

## 2025-06-02 ENCOUNTER — OFFICE VISIT (OUTPATIENT)
Dept: PSYCHIATRY | Facility: CLINIC | Age: 55
End: 2025-06-02
Payer: MEDICARE

## 2025-06-02 VITALS
HEART RATE: 69 BPM | WEIGHT: 227.8 LBS | HEIGHT: 68 IN | BODY MASS INDEX: 34.53 KG/M2 | SYSTOLIC BLOOD PRESSURE: 166 MMHG | DIASTOLIC BLOOD PRESSURE: 99 MMHG

## 2025-06-02 DIAGNOSIS — F51.04 INSOMNIA, PSYCHOPHYSIOLOGICAL: ICD-10-CM

## 2025-06-02 DIAGNOSIS — F79 INTELLECTUAL DISABILITY: ICD-10-CM

## 2025-06-02 DIAGNOSIS — F25.0 SCHIZOAFFECTIVE DISORDER, BIPOLAR TYPE: Primary | ICD-10-CM

## 2025-06-02 RX ORDER — TRAZODONE HYDROCHLORIDE 300 MG/1
TABLET ORAL
Start: 2025-06-02

## 2025-06-02 RX ORDER — ESZOPICLONE 3 MG/1
3 TABLET, FILM COATED ORAL NIGHTLY PRN
Qty: 30 TABLET | Refills: 1 | Status: SHIPPED | OUTPATIENT
Start: 2025-06-02

## 2025-06-02 NOTE — PROGRESS NOTES
"[facility]  Follow-up Visit    Chief Complaint: \"He was in the hospital back in August... psychotic break... that's what we're calling it... transported to the hospital from [facility]... unresponsive in a cell... (low pulse, BP)... he was telling everybody he was [place] and he was God... seen someone at [facility] for tremors because of the medications... the medications he's on, he's a zombie, he can't do anything... recently had an MRI showing that his pituitary gland is thickening... virtual visit... [facility] and he had decreased the Risperdal... he made sexual gestures.\"     History of Present Illness: Alec Medrano is a 55 y.o. male who presents today for follow-up. Last seen by this practice: 04/18 at which time eszopiclone replaced trazodone and olanzapine was tapered.    Current treatment regimen includes:   - olanzapine 10 mg HS  - eszopiclone 3 mg HS  -  mg QD + 1250 mg HS  - benztropine 1 mg BID PRN tremors  - clonazepam 0.5 mg BID PRN anxiety   - escitalopram 10 mg HS    Alec presents on time and accompanied by his sister, Simran, in no acute distress and engages with me appropriately. Today they report he's been waking frequently through the night despite recent changes, though daytime sedation is much improved and Alec has been more freely active and engaged throughout the day. Schizoaffective and anxious symptoms are manageable with current interventions. Intellectual capacity is stable. Because he's seen improvements during daytime hours and his weight gain has plateaued, we have decided to maintain olanzapine at its current dose - we will look to reinstate trazodone to assess for sleep optimization.  - sleep: disrupted  - appetite: generally adequate; +1 lb since last visit (+20 lb over last two visits)  - medication adverse effects: grogginess significantly improved    Interval History and Clinical Commentary:   Ego-syntonic. Alec presents in " a fashion consistent with prior assessments with regard to MSE.    Has a cruise planned for July. Interval history reported to be generally unremarkable.     Discussed BP - no alarm symptoms, discussed long-term risks of elevated BP. He's been up and active more so today than average days, they suspect it's this. Simran indicates she plans to continue to monitor.     Axis I: schizoaffective disorder (bipolar type), ARIES  Axis II: ID  Axis III: defer  Axis IV: defer  Axis V: 50 (correction, no decline)    Differential considerations: N/A    Adherence:  Treatment adherence is appropriate; issues in this regard have included: N/A. Patient is advised not to misuse prescribed medications or to use them with any exogenous substances that aren't disclosed to this provider as they may interact with the regimen to the patient's detriment. The importance of adherence to the recommended treatment and interval follow-up appointments has been emphasized.     Education:  I have counseled Alec with regard to diagnoses and the recommended treatment regimen as documented below: I will begin eszopiclone for insomnia; patient advised of potential SE with eszopiclone including sedation, aberrant sleep behaviors, hallucinations, anxiety, and xerostomia in addition to possible development of tolerance and dependence with long term use. I have advised that because medications can elicit idiosyncratic reactions in different individuals that SE may present in ways that haven't been discussed. I have reiterated the importance of discussing with me any clinical changes that could represent potential adverse effects regarding the medication regimen.    Patient acknowledges the diagnoses per my rendered interpretation. Patient is agreeable to call 911 or go to the nearest ER should he become concerned for his own safety and/or the safety of those around him. Patient demonstrates understanding of potential risks/benefits/side effects associated  with the recommended treatment regimen and is amenable to proceed in the fashion outlined below. Patient has been encouraged to cultivate constructive patterns of living including limiting daily caffeine intake, hydrating appropriately, regularly eating nutritious foods, engaging sleep hygiene practices, engaging in appropriate exposure to sunlight, engaging with hobbies in balance with life necessities, and exercising appropriate to their capacity to do so.    Risk:  There is no significant change to risk profile discernible during today's evaluation - do note that this is subject to change with the Restorationism of new stressors, treatment non-adherence, use of substances, and/or new medical ails. There is no appreciable evidence of intent for harm to self or others. There are no appreciable indices of sulema/psychosis.    Contraception and mitigation of potential teratogenicity: patient does not have a uterus.    Psychotherapy:  - Time: N/A   - interventions employed: the therapeutic alliance was strengthened to encourage the patient to express their thoughts and feelings freely. Esteem building was enhanced through praise, reassurance, normalizing/challenging, and encouragement as appropriate. Coping skills were enhanced to build distress tolerance skills and emotional regulation. Allowed patient to freely discuss issues without interruption or judgement with unconditional positive regard, active listening skills, and empathy. Provided a safe, confidential environment to facilitate the development of a positive therapeutic relationship and encourage open, honest communication. Assisted patient in processing session content; acknowledged and normalized/addressed, as appropriate, patient’s thoughts, feelings, and concerns by utilizing a person-centered approach in efforts to build appropriate rapport and a positive therapeutic relationship with open and honest communication.   - Diagnoses: see assessment and plan below  -  "Symptoms: see subjective above  - Goals              - patient: sustain improvements seen in reduced psychotic symptoms, sustain mood improvements, continue to bolster functional capacity              - provider: challenge patterns of living conducive to pathology, strengthen defenses, promote problems solving, restore adaptive functioning and provide symptom relief.  - Treatment plan: continue supportive psychotherapy in subsequent appointments to provide symptom relief; see assessment and plan below for additional details:              - iteration: 1              - progress: at goal              - (X)illumination, (X)contextualization, (X)detection, (X)development, (X)elaboration, (X)refinement  - functional status: optimized  - mental status exam: as below  - prognosis: fair    Psychiatric History:  Diagnoses: ID, sisters report other diagnoses haven't been rendered  Outpatient history: around 1 month ago with [facility]  Inpatient history: [facility]  Medication trials: risperidone (tremors, drooling, overly sedated), duloxetine, trazodone  Other treatment modalities: has done therapy  Presenting regimen:  mg BID (recently tried to taper and began exhibiting sexual gestures)  Self harm: denies  Suicide attempts: denies     Substance Abuse History:   Types/methods/frequency: history of alcohol abuse/dependence     Social History:  Residence: lives with his sisters back and forth, requiring 24-hour monitoring  Vocation: denies  Education: HS diploma  Pertinent developmental history: had LD, was in special educational classes; denies abuse hx  Pertinent legal history: child support violation in the past, attempted escape, assault (after throwing urine on another inmate), PI, possession of marijuana  Hobbies/interests: defer  Nondenominational: \"I believe in God\"  Exercise: walks  Dietary habits: defer  Sleep hygiene: defer  Social habits: no pertinent issues  Sunlight: no concern for under-exposure  Caffeine intake: " 1 cup of coffee, occasional tea, soda (3 daily)  Hydration habits: doesn't drink enough water   history: denies    Social History     Socioeconomic History    Marital status: Unknown   Tobacco Use    Smoking status: Every Day     Current packs/day: 1.00     Average packs/day: 1.3 packs/day for 45.0 years (60.0 ttl pk-yrs)     Types: Cigarettes     Passive exposure: Current    Smokeless tobacco: Never   Vaping Use    Vaping status: Never Used   Substance and Sexual Activity    Alcohol use: Not Currently    Drug use: Never    Sexual activity: Not Currently     Tobacco use counseling/intervention: patient has been counseled with regard to risks of tobacco use and encouraged to consider quitting, with or without the use of adjunctive medication, by first setting a prospective quit date. Currently contemplative by transtheoretical model.     PHQ-9 Depression Screening  PHQ-9 Total Score:       Little interest or pleasure in doing things?     Feeling down, depressed, or hopeless?     PHQ-2 Total Score     Trouble falling or staying asleep, or sleeping too much?     Feeling tired or having little energy?     Poor appetite or overeating?     Feeling bad about yourself - or that you are a failure or have let yourself or your family down?     Trouble concentrating on things, such as reading the newspaper or watching television?     Moving or speaking so slowly that other people could have noticed? Or the opposite - being so fidgety or restless that you have been moving around a lot more than usual?     Thoughts that you would be better off dead, or of hurting yourself in some way?     PHQ-9 Total Score     If you checked off any problems, how difficult have these problems made it for you to do your work, take care of things at home, or get along with other people?       Change in PHQ-9 since last measure: N/A (12)    ARIES-7       Change in ARIES-7 since last measure: N/A (0)    Problem List:  Patient Active Problem List    Diagnosis    Schizoaffective disorder, bipolar type    Alcohol use disorder, moderate, in early remission, dependence    Intellectual disability    Insomnia, psychophysiological    ARIES (generalized anxiety disorder)     Allergy:   Allergies   Allergen Reactions    Haloperidol Anxiety, Mental Status Change and Irritability      Discontinued Medications:  Medications Discontinued During This Encounter   Medication Reason    metFORMIN (GLUCOPHAGE) 500 MG tablet     eszopiclone (Lunesta) 3 MG tablet Reorder     Current Medications:   Current Outpatient Medications   Medication Sig Dispense Refill    albuterol sulfate  (90 Base) MCG/ACT inhaler Inhale 2 puffs.      Alcohol Swabs (DropSafe Alcohol Prep) 70 % pads       apixaban (Eliquis) 5 MG tablet tablet Take 1 tablet by mouth Every 12 (Twelve) Hours.      atorvastatin (LIPITOR) 40 MG tablet Take 1 tablet by mouth every night at bedtime.      benztropine (COGENTIN) 1 MG tablet TAKE 1 TABLET BY MOUTH 2 TIMES A DAY AS NEEDED FOR TREMORS OR DYSTONIA      Bevespi Aerosphere 9-4.8 MCG/ACT aerosol Inhale 2 sprays 2 (Two) Times a Day.      Blood Glucose Monitoring Suppl (True Metrix Air Glucose Meter) w/Device kit       clonazePAM (KlonoPIN) 0.5 MG tablet Take 1 tablet by mouth 2 (Two) Times a Day. 60 tablet 2    dapagliflozin Propanediol (Farxiga) 10 MG tablet Take 1 tablet by mouth once daily 30 tablet 0    divalproex (DEPAKOTE) 250 MG DR tablet Take 1 tablet by mouth Every Night. 90 tablet 3    divalproex (DEPAKOTE) 500 MG DR tablet TAKE 1 TABLET BY MOUTH DAILY AND 2 TABLETS EVERY NIGHT AS DIRECTED 270 tablet 3    escitalopram (LEXAPRO) 10 MG tablet TAKE 1 TABLET EVERY DAY 90 tablet 0    eszopiclone (Lunesta) 3 MG tablet Take 1 tablet by mouth At Night As Needed for Sleep. Take immediately before bedtime 30 tablet 1    famotidine (PEPCID) 20 MG tablet Take 1 tablet by mouth 2 (Two) Times a Day.      folic acid (FOLVITE) 1 MG tablet Take 1 tablet by mouth Daily.       glipizide (GLUCOTROL XL) 10 MG 24 hr tablet       hydroCHLOROthiazide 25 MG tablet Take 1 tablet by mouth Daily.      metFORMIN ER (GLUCOPHAGE-XR) 500 MG 24 hr tablet Take 2 tablets by mouth 2 (Two) Times a Day With Meals.      metoprolol succinate XL (TOPROL-XL) 100 MG 24 hr tablet Take 1 tablet by mouth Daily.      OLANZapine (zyPREXA) 10 MG tablet Take 1 tablet by mouth Every Night. 90 tablet 0    thiamine (VITAMIN B1) 100 MG tablet       tiotropium bromide-olodaterol (Stiolto Respimat) 2.5-2.5 MCG/ACT aerosol solution inhaler Inhale 2 puffs Daily.      True Metrix Blood Glucose Test test strip 1 each by Other route Daily.      TRUEplus Lancets 33G misc       vitamin B-12 (CYANOCOBALAMIN) 1000 MCG tablet Take 1 tablet by mouth Daily.      traZODone (DESYREL) 300 MG tablet Take between a half to one full tablet by mouth nightly as needed for sleep       No current facility-administered medications for this visit.     Past Medical History:  Past Medical History:   Diagnosis Date    Alcohol abuse     Alcoholism     Anxiety     Bipolar disorder     COPD (chronic obstructive pulmonary disease)     Depression     Diabetes mellitus     Hyperlipidemia     Hypertension     Hypotension     Parkinson's disease     Pneumothorax     Psychiatric illness     Psychosis     Pulmonary emboli     Schizoaffective disorder 2023    Substance abuse     Unresponsive episode      Past Surgical History:  Past Surgical History:   Procedure Laterality Date    ABDOMINAL SURGERY      SPLENECTOMY       Mental Status Exam:   Appearance: well-groomed, sits upright, age-appropriate, above average habitus  Behavior: sedated, cooperative, limited eye-contact  Mood/affect: euthymic / mood-congruent, blunted  Speech: within expected variance; appropriate rate, rhythm, tone; non-pressured  Thought Process: concrete, goal-directed; no FOI or BERTRAND  Thought Content: coherent, denies AVH, does not vocalize prior delusions today (no concerns from sister  "in this regard)  SI/HI: denies both SI and HI; exhibits future-orientation, self-advocates appropriately, no regular self-harm, no appreciable intent  Memory: deficits apparent  Orientation: oriented to person  Concentration: appropriate during evaluation  Intellectual capacity: below average  Insight: partial by given history/exam  Judgment: fair by given history/exam  Psychomotor: appropriate  Gait: slowed    Review of Systems:  Review of Systems   Constitutional:  Positive for fatigue. Negative for chills, diaphoresis and fever.   HENT:  Negative for congestion and sore throat.    Respiratory:  Positive for cough.    Cardiovascular:  Negative for chest pain.   Gastrointestinal:  Positive for nausea. Negative for abdominal pain and vomiting.   Genitourinary:  Negative for dysuria.   Musculoskeletal:  Negative for myalgias and neck pain.   Skin:  Negative for rash.   Neurological:  Positive for weakness and headaches. Negative for numbness.     Vital Signs:   /99   Pulse 69   Ht 172.7 cm (68\")   Wt 103 kg (227 lb 12.8 oz)   BMI 34.64 kg/m²      Lab Results:   No visits with results within 6 Month(s) from this visit.   Latest known visit with results is:   Lab on 08/08/2024   Component Date Value Ref Range Status    Valproic Acid 08/08/2024 47.0 (L)  50.0 - 125.0 mcg/mL Final     EKG Results:  No orders to display     Imaging Results:  No Images in the past 120 days found.    ASSESSMENT AND PLAN:    ICD-10-CM ICD-9-CM   1. Schizoaffective disorder, bipolar type  F25.0 295.70   2. Insomnia, psychophysiological  F51.04 307.42   3. Intellectual disability  F79 [id number]       55 y.o. male who presents today for follow-up. We have discussed the interval history and the treatment plan below:      Medication regimen: restart trazodone 150-300 mg HS PRN insomnia (has supply) - continue VPA, olanzapine, eszopiclone, escitalopram, clonazepam (PRN), benztropine  Monitoring: reviewed labs as populated above; plan to order " maintenance labs at next visit   Primary psychotherapy: deferred  Follow-up: 6 weeks  Communications: N/A  Treatment plan: N/A (at goal)    TREATMENT PLAN/GOALS: challenge patterns of living conducive to symptom burden, implement recommended regimen as above with augmentative, intermittent supportive psychotherapy to reduce symptom burden. Patient acknowledged and verbally consented to continue treatment.      Billing: This encounter is of moderate complexity based on number/complexity of problems addressed today and risk of complications/morbidity: 2+ stable chronic illnesses and and prescription management.     Electronically signed by Jay Jay Boss MD, 06/02/25, 1584

## 2025-07-03 DIAGNOSIS — F51.04 INSOMNIA, PSYCHOPHYSIOLOGICAL: ICD-10-CM

## 2025-07-03 DIAGNOSIS — F25.0 SCHIZOAFFECTIVE DISORDER, BIPOLAR TYPE: ICD-10-CM

## 2025-07-03 RX ORDER — TRAZODONE HYDROCHLORIDE 300 MG/1
300 TABLET ORAL NIGHTLY
Qty: 90 TABLET | Refills: 3 | Status: SHIPPED | OUTPATIENT
Start: 2025-07-03

## 2025-07-03 RX ORDER — OLANZAPINE 10 MG/1
10 TABLET, FILM COATED ORAL NIGHTLY
Qty: 90 TABLET | Refills: 3 | Status: SHIPPED | OUTPATIENT
Start: 2025-07-03 | End: 2025-07-07

## 2025-07-03 NOTE — TELEPHONE ENCOUNTER
NEXT VISIT WITH PROVIDER   Appointment with Jay Jay Boss MD (07/07/2025)     LAST SEEN BY PROVIDER   Office Visit with Jay Jay Boss MD (06/02/2025)     LAST MED REFILL  OLANZapine (zyPREXA) 10 MG tablet (04/18/2025)  traZODone (DESYREL) 300 MG tablet (06/02/2025)    PROVIDER PLEASE ADVISE

## 2025-07-03 NOTE — PROGRESS NOTES
"[facility]  Follow-up Visit    Chief Complaint: \"He was in the hospital back in August... psychotic break... that's what we're calling it... transported to the hospital from [facility]... unresponsive in a cell... (low pulse, BP)... he was telling everybody he was [place] and he was God... seen someone at [facility] for tremors because of the medications... the medications he's on, he's a zombie, he can't do anything... recently had an MRI showing that his pituitary gland is thickening... virtual visit... [facility] and he had decreased the Risperdal... he made sexual gestures.\"     History of Present Illness: Alec Medrano is a 55 y.o. male who presents today for follow-up. Last seen by this practice: 06/02 at which time trazodone was restarted.    Current treatment regimen includes:   - olanzapine 10 mg HS  - eszopiclone 3 mg HS  -  mg QD + 1250 mg HS  - benztropine 1 mg BID PRN tremors  - clonazepam 0.5 mg BID PRN anxiety   - escitalopram 10 mg HS    Alec presents on time and accompanied by his sister, Simran, in no acute distress and engages with me appropriately. Today they report he's been having \"duh moments\" in which he's not responsive to social cues, seems in a daze - they don't suspect this is hypoglycemia as has been the case in the past. Between this issue and the persisting weight gain, we have decided to trial an alternative to olanzapine. Schizoaffective and anxious symptoms are manageable with current interventions. Intellectual capacity is stable.   - sleep: disrupted (often to eat)  - appetite: enhanced; +6 lb since last visit (+26 lb over last three visits)  - medication adverse effects: \"duh moments\" - altered awareness/alertness    Interval History and Clinical Commentary:   Ego-syntonic. Alec presents in a fashion consistent with prior assessments with regard to MSE.    He's going to be visiting his sister, Kimberlyn, while family is on " vacation. He's looking forward to this.    Axis I: schizoaffective disorder (bipolar type), ARIES  Axis II: ID  Axis III: defer  Axis IV: defer  Axis V: 50 (correction, no decline)    Differential considerations: N/A    Adherence:  Treatment adherence is appropriate; issues in this regard have included: N/A. Patient is advised not to misuse prescribed medications or to use them with any exogenous substances that aren't disclosed to this provider as they may interact with the regimen to the patient's detriment. The importance of adherence to the recommended treatment and interval follow-up appointments has been emphasized.     Education:  I have counseled Alec with regard to diagnoses and the recommended treatment regimen as documented below: I will be prescribing aripiprazole for schizoaffective disorder. Patient has been advised this agent has propensity to contribute to EPS (particularly dystonia, tremor, akathisia, and TD), changes in arousal, and - in rare cases - has contributed to development of pathological gambling habits. I have advised that because medications can elicit idiosyncratic reactions in different individuals that SE may present in ways that haven't been discussed. I have reiterated the importance of discussing with me any clinical changes that could represent potential adverse effects regarding the medication regimen.    Patient acknowledges the diagnoses per my rendered interpretation. Patient is agreeable to call 911 or go to the nearest ER should he become concerned for his own safety and/or the safety of those around him. Patient demonstrates understanding of potential risks/benefits/side effects associated with the recommended treatment regimen and is amenable to proceed in the fashion outlined below. Patient has been encouraged to cultivate constructive patterns of living including limiting daily caffeine intake, hydrating appropriately, regularly eating nutritious foods, engaging sleep  hygiene practices, engaging in appropriate exposure to sunlight, engaging with hobbies in balance with life necessities, and exercising appropriate to their capacity to do so.    Risk:  There is no significant change to risk profile discernible during today's evaluation - do note that this is subject to change with the [employer] of new stressors, treatment non-adherence, use of substances, and/or new medical ails. There is no appreciable evidence of intent for harm to self or others. There are no appreciable indices of sulema/psychosis.    Contraception and mitigation of potential teratogenicity: patient does not have a uterus.    Psychotherapy:  - Time: N/A   - interventions employed: the therapeutic alliance was strengthened to encourage the patient to express their thoughts and feelings freely. Esteem building was enhanced through praise, reassurance, normalizing/challenging, and encouragement as appropriate. Coping skills were enhanced to build distress tolerance skills and emotional regulation. Allowed patient to freely discuss issues without interruption or judgement with unconditional positive regard, active listening skills, and empathy. Provided a safe, confidential environment to facilitate the development of a positive therapeutic relationship and encourage open, honest communication. Assisted patient in processing session content; acknowledged and normalized/addressed, as appropriate, patient’s thoughts, feelings, and concerns by utilizing a person-centered approach in efforts to build appropriate rapport and a positive therapeutic relationship with open and honest communication.   - Diagnoses: see assessment and plan below  - Symptoms: see subjective above  - Goals              - patient: sustain improvements seen in reduced psychotic symptoms, sustain mood improvements, continue to bolster functional capacity              - provider: challenge patterns of living conducive to pathology, strengthen defenses,  "promote problems solving, restore adaptive functioning and provide symptom relief.  - Treatment plan: continue supportive psychotherapy in subsequent appointments to provide symptom relief; see assessment and plan below for additional details:              - iteration: 1              - progress: at goal              - (X)illumination, (X)contextualization, (X)detection, (X)development, (X)elaboration, (X)refinement  - functional status: optimized  - mental status exam: as below  - prognosis: fair    Psychiatric History:  Diagnoses: ID, sisters report other diagnoses haven't been rendered  Outpatient history: around 1 month ago with [facility]  Inpatient history: [facility]  Medication trials: risperidone (tremors, drooling, overly sedated), duloxetine, trazodone, olanzapine (weight gain, changes in alertness)  Other treatment modalities: has done therapy  Presenting regimen:  mg BID (recently tried to taper and began exhibiting sexual gestures)  Self harm: denies  Suicide attempts: denies     Substance Abuse History:   Types/methods/frequency: history of alcohol abuse/dependence     Social History:  Residence: lives with his sisters back and forth, requiring 24-hour monitoring  Vocation: denies  Education: HS diploma  Pertinent developmental history: had LD, was in special educational classes; denies abuse hx  Pertinent legal history: child support violation in the past, attempted escape, assault (after throwing urine on another inmate), PI, possession of marijuana  Hobbies/interests: defer  Scientology: \"I believe in God\"  Exercise: walks  Dietary habits: defer  Sleep hygiene: defer  Social habits: no pertinent issues  Sunlight: no concern for under-exposure  Caffeine intake: 1 cup of coffee, occasional tea, soda (3 daily)  Hydration habits: doesn't drink enough water   history: denies    Social History     Socioeconomic History    Marital status: Unknown   Tobacco Use    Smoking status: Every Day     " Current packs/day: 1.00     Average packs/day: 1.3 packs/day for 45.0 years (60.0 ttl pk-yrs)     Types: Cigarettes     Passive exposure: Current    Smokeless tobacco: Never   Vaping Use    Vaping status: Never Used   Substance and Sexual Activity    Alcohol use: Not Currently    Drug use: Never    Sexual activity: Not Currently     Tobacco use counseling/intervention: patient has been counseled with regard to risks of tobacco use and encouraged to consider quitting, with or without the use of adjunctive medication, by first setting a prospective quit date. Currently contemplative by transtheoretical model.     PHQ-9 Depression Screening  PHQ-9 Total Score:  15    Little interest or pleasure in doing things? Not at all   Feeling down, depressed, or hopeless? Not at all   PHQ-2 Total Score 0   Trouble falling or staying asleep, or sleeping too much? Almost all   Feeling tired or having little energy? Almost all   Poor appetite or overeating? Almost all   Feeling bad about yourself - or that you are a failure or have let yourself or your family down? Not at all   Trouble concentrating on things, such as reading the newspaper or watching television? Almost all   Moving or speaking so slowly that other people could have noticed? Or the opposite - being so fidgety or restless that you have been moving around a lot more than usual? Almost all   Thoughts that you would be better off dead, or of hurting yourself in some way? Not at all   PHQ-9 Total Score 15   If you checked off any problems, how difficult have these problems made it for you to do your work, take care of things at home, or get along with other people? Extremely difficult     Change in PHQ-9 since last measure: +3 (12)    ARIES-7  Feeling nervous, anxious or on edge: More than half the days  Not being able to stop or control worrying: Not at all  Worrying too much about different things: Not at all  Trouble Relaxing: Nearly every day  Being so restless that it  is hard to sit still: Several days  Feeling afraid as if something awful might happen: Several days  Becoming easily annoyed or irritable: Not at all  ARIES 7 Total Score: 7  If you checked any problems, how difficult have these problems made it for you to do your work, take care of things at home, or get along with other people: Not difficult at all    Change in ARIES-7 since last measure: +7 (0)    Problem List:  Patient Active Problem List   Diagnosis    Schizoaffective disorder, bipolar type    Alcohol use disorder, moderate, in early remission, dependence    Intellectual disability    Insomnia, psychophysiological    ARIES (generalized anxiety disorder)     Allergy:   Allergies   Allergen Reactions    Haloperidol Anxiety, Mental Status Change and Irritability      Discontinued Medications:  Medications Discontinued During This Encounter   Medication Reason    OLANZapine (zyPREXA) 10 MG tablet      Current Medications:   Current Outpatient Medications   Medication Sig Dispense Refill    Alcohol Swabs (DropSafe Alcohol Prep) 70 % pads       apixaban (Eliquis) 5 MG tablet tablet Take 1 tablet by mouth Every 12 (Twelve) Hours.      atorvastatin (LIPITOR) 40 MG tablet Take 1 tablet by mouth every night at bedtime.      benztropine (COGENTIN) 1 MG tablet TAKE 1 TABLET BY MOUTH 2 TIMES A DAY AS NEEDED FOR TREMORS OR DYSTONIA      Bevespi Aerosphere 9-4.8 MCG/ACT aerosol Inhale 2 sprays 2 (Two) Times a Day.      Blood Glucose Monitoring Suppl (True Metrix Air Glucose Meter) w/Device kit       clonazePAM (KlonoPIN) 0.5 MG tablet Take 1 tablet by mouth 2 (Two) Times a Day. 60 tablet 2    dapagliflozin Propanediol (Farxiga) 10 MG tablet Take 1 tablet by mouth once daily 30 tablet 0    divalproex (DEPAKOTE) 250 MG DR tablet Take 1 tablet by mouth Every Night. 90 tablet 3    divalproex (DEPAKOTE) 500 MG DR tablet TAKE 1 TABLET BY MOUTH DAILY AND 2 TABLETS EVERY NIGHT AS DIRECTED 270 tablet 3    escitalopram (LEXAPRO) 10 MG tablet  TAKE 1 TABLET EVERY DAY 90 tablet 0    eszopiclone (Lunesta) 3 MG tablet Take 1 tablet by mouth At Night As Needed for Sleep. Take immediately before bedtime 30 tablet 1    famotidine (PEPCID) 20 MG tablet Take 1 tablet by mouth 2 (Two) Times a Day.      folic acid (FOLVITE) 1 MG tablet Take 1 tablet by mouth Daily.      glipizide (GLUCOTROL XL) 10 MG 24 hr tablet       hydroCHLOROthiazide 25 MG tablet Take 1 tablet by mouth Daily.      metFORMIN ER (GLUCOPHAGE-XR) 500 MG 24 hr tablet Take 2 tablets by mouth 2 (Two) Times a Day With Meals.      metoprolol succinate XL (TOPROL-XL) 100 MG 24 hr tablet Take 1 tablet by mouth Daily.      thiamine (VITAMIN B1) 100 MG tablet       tiotropium bromide-olodaterol (Stiolto Respimat) 2.5-2.5 MCG/ACT aerosol solution inhaler Inhale 2 puffs Daily.      traZODone (DESYREL) 300 MG tablet TAKE 1 TABLET EVERY NIGHT 90 tablet 3    True Metrix Blood Glucose Test test strip 1 each by Other route Daily.      TRUEplus Lancets 33G misc       vitamin B-12 (CYANOCOBALAMIN) 1000 MCG tablet Take 1 tablet by mouth Daily.       No current facility-administered medications for this visit.     Past Medical History:  Past Medical History:   Diagnosis Date    Alcohol abuse     Alcoholism     Anxiety     Bipolar disorder     COPD (chronic obstructive pulmonary disease)     Depression     Diabetes mellitus     Hyperlipidemia     Hypertension     Hypotension     Parkinson's disease     Pneumothorax     Psychiatric illness     Psychosis     Pulmonary emboli     Schizoaffective disorder 2023    Substance abuse     Unresponsive episode      Past Surgical History:  Past Surgical History:   Procedure Laterality Date    ABDOMINAL SURGERY      SPLENECTOMY       Mental Status Exam:   Appearance: well-groomed, sits upright, age-appropriate, above average habitus  Behavior: sedated, cooperative, limited eye-contact  Mood/affect: fair / mood-congruent, blunted  Speech: within expected variance; appropriate rate,  "rhythm, tone; non-pressured  Thought Process: concrete, goal-directed; no FOI or BERTRAND  Thought Content: coherent, denies AVH, does not vocalize prior delusions today (no concerns from sister in this regard)  SI/HI: denies both SI and HI; exhibits future-orientation, self-advocates appropriately, no regular self-harm, no appreciable intent  Memory: deficits apparent  Orientation: oriented to person  Concentration: appropriate during evaluation  Intellectual capacity: below average  Insight: partial by given history/exam  Judgment: fair by given history/exam  Psychomotor: appropriate  Gait: slowed    Review of Systems:  Review of Systems   Constitutional:  Negative for chills, diaphoresis, fatigue and fever.   HENT:  Negative for congestion and sore throat.    Respiratory:  Negative for cough.    Cardiovascular:  Negative for chest pain.   Gastrointestinal:  Negative for abdominal pain, nausea and vomiting.   Genitourinary:  Negative for dysuria.   Musculoskeletal:  Negative for myalgias and neck pain.   Skin:  Negative for rash.   Neurological:  Negative for weakness, numbness and headaches.     Vital Signs:   /80   Pulse 65   Ht 172.7 cm (68\")   Wt 106 kg (233 lb 12.8 oz)   SpO2 94%   BMI 35.55 kg/m²      Lab Results:   No visits with results within 6 Month(s) from this visit.   Latest known visit with results is:   Lab on 08/08/2024   Component Date Value Ref Range Status    Valproic Acid 08/08/2024 47.0 (L)  50.0 - 125.0 mcg/mL Final     EKG Results:  No orders to display     Imaging Results:  No Images in the past 120 days found.    ASSESSMENT AND PLAN:    ICD-10-CM ICD-9-CM   1. Schizoaffective disorder, bipolar type  F25.0 295.70   2. ARIES (generalized anxiety disorder)  F41.1 300.02   3. Intellectual disability  F79 319   4. Insomnia, psychophysiological  F51.04 307.42     55 y.o. male who presents today for follow-up. We have discussed the interval history and the treatment plan below:  "     Medication regimen: replace olanzapine with aripiprazole 10 mg QD - continue VPA, olanzapine, eszopiclone, escitalopram, clonazepam (PRN), benztropine  Monitoring: reviewed labs as populated above; plan to order maintenance labs at next visit   Primary psychotherapy: deferred  Follow-up: 4-6 weeks  Communications: N/A  Treatment plan: due; defer    TREATMENT PLAN/GOALS: challenge patterns of living conducive to symptom burden, implement recommended regimen as above with augmentative, intermittent supportive psychotherapy to reduce symptom burden. Patient acknowledged and verbally consented to continue treatment.      Billing: This encounter is of moderate complexity based on number/complexity of problems addressed today and risk of complications/morbidity: 2+ stable chronic illnesses and and prescription management.     Electronically signed by Jay Jay Boss MD, 07/07/25, 3235

## 2025-07-07 ENCOUNTER — OFFICE VISIT (OUTPATIENT)
Dept: PSYCHIATRY | Facility: CLINIC | Age: 55
End: 2025-07-07
Payer: MEDICARE

## 2025-07-07 VITALS
HEART RATE: 65 BPM | HEIGHT: 68 IN | DIASTOLIC BLOOD PRESSURE: 80 MMHG | SYSTOLIC BLOOD PRESSURE: 154 MMHG | BODY MASS INDEX: 35.43 KG/M2 | OXYGEN SATURATION: 94 % | WEIGHT: 233.8 LBS

## 2025-07-07 DIAGNOSIS — F79 INTELLECTUAL DISABILITY: ICD-10-CM

## 2025-07-07 DIAGNOSIS — F25.0 SCHIZOAFFECTIVE DISORDER, BIPOLAR TYPE: Primary | ICD-10-CM

## 2025-07-07 DIAGNOSIS — F41.1 GAD (GENERALIZED ANXIETY DISORDER): ICD-10-CM

## 2025-07-07 DIAGNOSIS — F51.04 INSOMNIA, PSYCHOPHYSIOLOGICAL: ICD-10-CM

## 2025-07-07 PROCEDURE — 96127 BRIEF EMOTIONAL/BEHAV ASSMT: CPT | Performed by: PSYCHIATRY & NEUROLOGY

## 2025-07-07 PROCEDURE — 1159F MED LIST DOCD IN RCRD: CPT | Performed by: PSYCHIATRY & NEUROLOGY

## 2025-07-07 PROCEDURE — 1160F RVW MEDS BY RX/DR IN RCRD: CPT | Performed by: PSYCHIATRY & NEUROLOGY

## 2025-07-07 PROCEDURE — 99214 OFFICE O/P EST MOD 30 MIN: CPT | Performed by: PSYCHIATRY & NEUROLOGY

## 2025-07-07 RX ORDER — ESCITALOPRAM OXALATE 10 MG/1
10 TABLET ORAL DAILY
Qty: 90 TABLET | Refills: 0 | Status: SHIPPED | OUTPATIENT
Start: 2025-07-07 | End: 2025-07-09

## 2025-07-07 RX ORDER — ARIPIPRAZOLE 10 MG/1
10 TABLET ORAL DAILY
Qty: 30 TABLET | Refills: 1 | Status: SHIPPED | OUTPATIENT
Start: 2025-07-07

## 2025-07-09 DIAGNOSIS — F25.0 SCHIZOAFFECTIVE DISORDER, BIPOLAR TYPE: ICD-10-CM

## 2025-07-09 DIAGNOSIS — F41.1 GAD (GENERALIZED ANXIETY DISORDER): ICD-10-CM

## 2025-07-09 RX ORDER — ESCITALOPRAM OXALATE 10 MG/1
10 TABLET ORAL DAILY
Qty: 90 TABLET | Refills: 3 | Status: SHIPPED | OUTPATIENT
Start: 2025-07-09

## 2025-08-11 ENCOUNTER — OFFICE VISIT (OUTPATIENT)
Dept: PSYCHIATRY | Facility: CLINIC | Age: 55
End: 2025-08-11
Payer: MEDICARE

## 2025-08-11 VITALS
HEIGHT: 68 IN | DIASTOLIC BLOOD PRESSURE: 94 MMHG | OXYGEN SATURATION: 96 % | WEIGHT: 212 LBS | HEART RATE: 103 BPM | SYSTOLIC BLOOD PRESSURE: 137 MMHG | BODY MASS INDEX: 32.13 KG/M2

## 2025-08-11 DIAGNOSIS — F41.1 GAD (GENERALIZED ANXIETY DISORDER): ICD-10-CM

## 2025-08-11 DIAGNOSIS — R29.818 EXTRAPYRAMIDAL SYMPTOM: ICD-10-CM

## 2025-08-11 DIAGNOSIS — F25.0 SCHIZOAFFECTIVE DISORDER, BIPOLAR TYPE: Primary | ICD-10-CM

## 2025-08-11 DIAGNOSIS — F51.04 INSOMNIA, PSYCHOPHYSIOLOGICAL: ICD-10-CM

## 2025-08-11 DIAGNOSIS — F79 INTELLECTUAL DISABILITY: ICD-10-CM

## 2025-08-11 PROCEDURE — G2211 COMPLEX E/M VISIT ADD ON: HCPCS | Performed by: PSYCHIATRY & NEUROLOGY

## 2025-08-11 PROCEDURE — 99214 OFFICE O/P EST MOD 30 MIN: CPT | Performed by: PSYCHIATRY & NEUROLOGY

## 2025-08-11 PROCEDURE — 1159F MED LIST DOCD IN RCRD: CPT | Performed by: PSYCHIATRY & NEUROLOGY

## 2025-08-11 PROCEDURE — 1160F RVW MEDS BY RX/DR IN RCRD: CPT | Performed by: PSYCHIATRY & NEUROLOGY

## 2025-08-11 RX ORDER — CLONAZEPAM 0.5 MG/1
0.5 TABLET ORAL 2 TIMES DAILY PRN
Qty: 60 TABLET | Refills: 1 | Status: SHIPPED | OUTPATIENT
Start: 2025-08-11

## 2025-08-11 RX ORDER — QUETIAPINE FUMARATE 50 MG/1
50 TABLET, FILM COATED ORAL NIGHTLY
Qty: 30 TABLET | Refills: 1 | Status: SHIPPED | OUTPATIENT
Start: 2025-08-11

## 2025-08-11 RX ORDER — ESZOPICLONE 3 MG/1
3 TABLET, FILM COATED ORAL NIGHTLY PRN
Qty: 30 TABLET | Refills: 1 | Status: SHIPPED | OUTPATIENT
Start: 2025-08-11

## 2025-08-11 RX ORDER — CLONAZEPAM 0.5 MG/1
0.5 TABLET ORAL 2 TIMES DAILY
Qty: 60 TABLET | Refills: 1 | Status: SHIPPED | OUTPATIENT
Start: 2025-08-11 | End: 2025-08-11

## 2025-08-11 RX ORDER — BENZTROPINE MESYLATE 1 MG/1
1 TABLET ORAL 2 TIMES DAILY PRN
Qty: 180 TABLET | Refills: 0 | Status: SHIPPED | OUTPATIENT
Start: 2025-08-11